# Patient Record
Sex: FEMALE | Race: WHITE | NOT HISPANIC OR LATINO | Employment: OTHER | URBAN - METROPOLITAN AREA
[De-identification: names, ages, dates, MRNs, and addresses within clinical notes are randomized per-mention and may not be internally consistent; named-entity substitution may affect disease eponyms.]

---

## 2023-04-15 DIAGNOSIS — J30.9 ALLERGIC RHINITIS, UNSPECIFIED SEASONALITY, UNSPECIFIED TRIGGER: Primary | ICD-10-CM

## 2023-04-17 RX ORDER — FLUTICASONE PROPIONATE 50 MCG
SPRAY, SUSPENSION (ML) NASAL
Qty: 48 G | Refills: 3 | Status: SHIPPED | OUTPATIENT
Start: 2023-04-17 | End: 2024-02-05 | Stop reason: SINTOL

## 2023-06-15 DIAGNOSIS — E78.2 MIXED HYPERLIPIDEMIA: Primary | ICD-10-CM

## 2023-06-15 RX ORDER — ATORVASTATIN CALCIUM 20 MG/1
TABLET, FILM COATED ORAL
Qty: 90 TABLET | Refills: 1 | Status: SHIPPED | OUTPATIENT
Start: 2023-06-15 | End: 2023-12-15

## 2023-07-10 ENCOUNTER — TELEPHONE (OUTPATIENT)
Dept: PRIMARY CARE | Facility: CLINIC | Age: 83
End: 2023-07-10
Payer: MEDICARE

## 2023-07-10 NOTE — TELEPHONE ENCOUNTER
Patient stated she was instructed to call in 1 month prior to appt requesting labs her apppt is 8/7 please advise

## 2023-07-11 DIAGNOSIS — R35.0 URINARY FREQUENCY: ICD-10-CM

## 2023-07-11 DIAGNOSIS — E78.2 MIXED HYPERLIPIDEMIA: Primary | ICD-10-CM

## 2023-07-11 DIAGNOSIS — I25.10 CORONARY ARTERY DISEASE INVOLVING NATIVE CORONARY ARTERY OF NATIVE HEART WITHOUT ANGINA PECTORIS: ICD-10-CM

## 2023-07-11 DIAGNOSIS — E55.9 VITAMIN D DEFICIENCY: ICD-10-CM

## 2023-07-11 PROBLEM — I35.1 NONRHEUMATIC AORTIC VALVE INSUFFICIENCY: Status: ACTIVE | Noted: 2023-07-11

## 2023-07-11 PROBLEM — G47.00 INSOMNIA: Status: ACTIVE | Noted: 2023-07-11

## 2023-07-11 PROBLEM — F32.5 DEPRESSION, MAJOR, IN REMISSION (CMS-HCC): Status: ACTIVE | Noted: 2023-07-11

## 2023-07-11 PROBLEM — K21.9 GASTROESOPHAGEAL REFLUX: Status: ACTIVE | Noted: 2023-07-11

## 2023-07-11 PROBLEM — I47.29 VENTRICULAR TACHYCARDIA, NONSUSTAINED (MULTI): Status: ACTIVE | Noted: 2023-07-11

## 2023-07-11 PROBLEM — H26.9 CATARACT, BILATERAL: Status: ACTIVE | Noted: 2023-07-11

## 2023-07-11 PROBLEM — H90.3 SENSORINEURAL HEARING LOSS (SNHL) OF BOTH EARS: Status: ACTIVE | Noted: 2023-07-11

## 2023-07-11 PROBLEM — F32.A CONTROLLED DEPRESSION: Status: ACTIVE | Noted: 2023-07-11

## 2023-07-11 PROBLEM — R09.89 CAROTID BRUIT: Status: ACTIVE | Noted: 2023-07-11

## 2023-07-11 PROBLEM — I44.7 LEFT BUNDLE BRANCH BLOCK (LBBB): Status: ACTIVE | Noted: 2023-07-11

## 2023-07-11 PROBLEM — M50.30 DEGENERATION OF INTERVERTEBRAL DISC OF CERVICAL REGION: Status: ACTIVE | Noted: 2023-07-11

## 2023-07-11 PROBLEM — Z95.2 S/P TAVR (TRANSCATHETER AORTIC VALVE REPLACEMENT): Status: ACTIVE | Noted: 2023-07-11

## 2023-07-15 ENCOUNTER — LAB (OUTPATIENT)
Dept: LAB | Facility: LAB | Age: 83
End: 2023-07-15
Payer: MEDICARE

## 2023-07-15 DIAGNOSIS — I25.10 CORONARY ARTERY DISEASE INVOLVING NATIVE CORONARY ARTERY OF NATIVE HEART WITHOUT ANGINA PECTORIS: ICD-10-CM

## 2023-07-15 DIAGNOSIS — R35.0 URINARY FREQUENCY: ICD-10-CM

## 2023-07-15 DIAGNOSIS — E78.2 MIXED HYPERLIPIDEMIA: ICD-10-CM

## 2023-07-15 DIAGNOSIS — E55.9 VITAMIN D DEFICIENCY: ICD-10-CM

## 2023-07-15 LAB
ALANINE AMINOTRANSFERASE (SGPT) (U/L) IN SER/PLAS: 15 U/L (ref 7–45)
ALBUMIN (G/DL) IN SER/PLAS: 4.3 G/DL (ref 3.4–5)
ALKALINE PHOSPHATASE (U/L) IN SER/PLAS: 78 U/L (ref 33–136)
ANION GAP IN SER/PLAS: 12 MMOL/L (ref 10–20)
APPEARANCE, URINE: CLEAR
ASPARTATE AMINOTRANSFERASE (SGOT) (U/L) IN SER/PLAS: 18 U/L (ref 9–39)
BACTERIA, URINE: ABNORMAL /HPF
BASOPHILS (10*3/UL) IN BLOOD BY AUTOMATED COUNT: 0.05 X10E9/L (ref 0–0.1)
BASOPHILS/100 LEUKOCYTES IN BLOOD BY AUTOMATED COUNT: 0.9 % (ref 0–2)
BILIRUBIN TOTAL (MG/DL) IN SER/PLAS: 0.7 MG/DL (ref 0–1.2)
BILIRUBIN, URINE: NEGATIVE
BLOOD, URINE: NEGATIVE
CALCIDIOL (25 OH VITAMIN D3) (NG/ML) IN SER/PLAS: 67 NG/ML
CALCIUM (MG/DL) IN SER/PLAS: 9.1 MG/DL (ref 8.6–10.6)
CARBON DIOXIDE, TOTAL (MMOL/L) IN SER/PLAS: 28 MMOL/L (ref 21–32)
CHLORIDE (MMOL/L) IN SER/PLAS: 103 MMOL/L (ref 98–107)
CHOLESTEROL (MG/DL) IN SER/PLAS: 154 MG/DL (ref 0–199)
CHOLESTEROL IN HDL (MG/DL) IN SER/PLAS: 64.7 MG/DL
CHOLESTEROL/HDL RATIO: 2.4
COLOR, URINE: ABNORMAL
CREATININE (MG/DL) IN SER/PLAS: 0.86 MG/DL (ref 0.5–1.05)
EOSINOPHILS (10*3/UL) IN BLOOD BY AUTOMATED COUNT: 0.16 X10E9/L (ref 0–0.4)
EOSINOPHILS/100 LEUKOCYTES IN BLOOD BY AUTOMATED COUNT: 2.8 % (ref 0–6)
ERYTHROCYTE DISTRIBUTION WIDTH (RATIO) BY AUTOMATED COUNT: 13.5 % (ref 11.5–14.5)
ERYTHROCYTE MEAN CORPUSCULAR HEMOGLOBIN CONCENTRATION (G/DL) BY AUTOMATED: 31.9 G/DL (ref 32–36)
ERYTHROCYTE MEAN CORPUSCULAR VOLUME (FL) BY AUTOMATED COUNT: 90 FL (ref 80–100)
ERYTHROCYTES (10*6/UL) IN BLOOD BY AUTOMATED COUNT: 4.45 X10E12/L (ref 4–5.2)
GFR FEMALE: 66 ML/MIN/1.73M2
GLUCOSE (MG/DL) IN SER/PLAS: 85 MG/DL (ref 74–99)
GLUCOSE, URINE: NEGATIVE MG/DL
HEMATOCRIT (%) IN BLOOD BY AUTOMATED COUNT: 40.1 % (ref 36–46)
HEMOGLOBIN (G/DL) IN BLOOD: 12.8 G/DL (ref 12–16)
IMMATURE GRANULOCYTES/100 LEUKOCYTES IN BLOOD BY AUTOMATED COUNT: 0.4 % (ref 0–0.9)
KETONES, URINE: NEGATIVE MG/DL
LDL: 72 MG/DL (ref 0–99)
LEUKOCYTE ESTERASE, URINE: ABNORMAL
LEUKOCYTES (10*3/UL) IN BLOOD BY AUTOMATED COUNT: 5.7 X10E9/L (ref 4.4–11.3)
LYMPHOCYTES (10*3/UL) IN BLOOD BY AUTOMATED COUNT: 1.51 X10E9/L (ref 0.8–3)
LYMPHOCYTES/100 LEUKOCYTES IN BLOOD BY AUTOMATED COUNT: 26.7 % (ref 13–44)
MONOCYTES (10*3/UL) IN BLOOD BY AUTOMATED COUNT: 0.3 X10E9/L (ref 0.05–0.8)
MONOCYTES/100 LEUKOCYTES IN BLOOD BY AUTOMATED COUNT: 5.3 % (ref 2–10)
NEUTROPHILS (10*3/UL) IN BLOOD BY AUTOMATED COUNT: 3.62 X10E9/L (ref 1.6–5.5)
NEUTROPHILS/100 LEUKOCYTES IN BLOOD BY AUTOMATED COUNT: 63.9 % (ref 40–80)
NITRITE, URINE: NEGATIVE
NRBC (PER 100 WBCS) BY AUTOMATED COUNT: 0 /100 WBC (ref 0–0)
PH, URINE: 7 (ref 5–8)
PLATELETS (10*3/UL) IN BLOOD AUTOMATED COUNT: 203 X10E9/L (ref 150–450)
POTASSIUM (MMOL/L) IN SER/PLAS: 4.3 MMOL/L (ref 3.5–5.3)
PROTEIN TOTAL: 6.7 G/DL (ref 6.4–8.2)
PROTEIN, URINE: NEGATIVE MG/DL
RBC, URINE: ABNORMAL /HPF (ref 0–5)
SODIUM (MMOL/L) IN SER/PLAS: 139 MMOL/L (ref 136–145)
SPECIFIC GRAVITY, URINE: 1.01 (ref 1–1.03)
SQUAMOUS EPITHELIAL CELLS, URINE: <1 /HPF
THYROTROPIN (MIU/L) IN SER/PLAS BY DETECTION LIMIT <= 0.05 MIU/L: 2.74 MIU/L (ref 0.44–3.98)
TRIGLYCERIDE (MG/DL) IN SER/PLAS: 86 MG/DL (ref 0–149)
UREA NITROGEN (MG/DL) IN SER/PLAS: 14 MG/DL (ref 6–23)
UROBILINOGEN, URINE: <2 MG/DL (ref 0–1.9)
VLDL: 17 MG/DL (ref 0–40)
WBC, URINE: 1 /HPF (ref 0–5)

## 2023-07-15 PROCEDURE — 85025 COMPLETE CBC W/AUTO DIFF WBC: CPT

## 2023-07-15 PROCEDURE — 80061 LIPID PANEL: CPT

## 2023-07-15 PROCEDURE — 80053 COMPREHEN METABOLIC PANEL: CPT

## 2023-07-15 PROCEDURE — 84443 ASSAY THYROID STIM HORMONE: CPT

## 2023-07-15 PROCEDURE — 81001 URINALYSIS AUTO W/SCOPE: CPT

## 2023-07-15 PROCEDURE — 82306 VITAMIN D 25 HYDROXY: CPT

## 2023-07-15 PROCEDURE — 36415 COLL VENOUS BLD VENIPUNCTURE: CPT

## 2023-07-18 DIAGNOSIS — F32.A CONTROLLED DEPRESSION: Primary | ICD-10-CM

## 2023-07-18 RX ORDER — FLUOXETINE HYDROCHLORIDE 20 MG/1
CAPSULE ORAL
Qty: 90 CAPSULE | Refills: 1 | Status: SHIPPED | OUTPATIENT
Start: 2023-07-18 | End: 2024-01-18

## 2023-08-02 PROBLEM — R01.1 MURMUR: Status: ACTIVE | Noted: 2023-08-02

## 2023-08-02 PROBLEM — L30.9 ECZEMA: Status: ACTIVE | Noted: 2023-08-02

## 2023-08-02 PROBLEM — S76.311A STRAIN OF RIGHT HAMSTRING: Status: ACTIVE | Noted: 2023-08-02

## 2023-08-02 PROBLEM — R39.9 UTI SYMPTOMS: Status: ACTIVE | Noted: 2023-08-02

## 2023-08-02 PROBLEM — R35.0 URINARY FREQUENCY: Status: ACTIVE | Noted: 2023-08-02

## 2023-08-02 PROBLEM — R42 VERTIGO: Status: ACTIVE | Noted: 2023-08-02

## 2023-08-02 PROBLEM — H91.90 DECREASED HEARING: Status: ACTIVE | Noted: 2023-08-02

## 2023-08-02 PROBLEM — R07.89 CHEST HEAVINESS: Status: ACTIVE | Noted: 2023-08-02

## 2023-08-02 PROBLEM — J34.2 NASAL SEPTAL DEVIATION: Status: ACTIVE | Noted: 2023-08-02

## 2023-08-02 PROBLEM — J01.90 ACUTE NON-RECURRENT SINUSITIS: Status: ACTIVE | Noted: 2023-08-02

## 2023-08-02 PROBLEM — M85.80 OSTEOPENIA: Status: ACTIVE | Noted: 2023-08-02

## 2023-08-02 PROBLEM — R92.8 ABNORMAL MAMMOGRAM: Status: ACTIVE | Noted: 2023-08-02

## 2023-08-02 PROBLEM — J30.1 ALLERGIC RHINITIS DUE TO POLLEN: Status: ACTIVE | Noted: 2023-08-02

## 2023-08-02 PROBLEM — R55 SYNCOPE: Status: ACTIVE | Noted: 2023-08-02

## 2023-08-02 PROBLEM — L60.0 INGROWN TOENAIL: Status: ACTIVE | Noted: 2023-08-02

## 2023-08-02 PROBLEM — I35.0 AORTIC STENOSIS: Status: ACTIVE | Noted: 2017-11-14

## 2023-08-02 RX ORDER — ALBUTEROL SULFATE 90 UG/1
AEROSOL, METERED RESPIRATORY (INHALATION)
COMMUNITY
Start: 2017-03-22

## 2023-08-02 RX ORDER — LORATADINE 10 MG/1
1 TABLET ORAL DAILY
COMMUNITY

## 2023-08-02 RX ORDER — MV-MN/OM3/DHA/EPA/FISH/LUT/ZEA 250-5-1 MG
CAPSULE ORAL
COMMUNITY
Start: 2022-12-12 | End: 2023-10-23 | Stop reason: ALTCHOICE

## 2023-08-02 RX ORDER — CHOLECALCIFEROL (VITAMIN D3) 125 MCG
1 CAPSULE ORAL DAILY
COMMUNITY
Start: 2014-01-23

## 2023-08-02 RX ORDER — PREDNISOLONE ACETATE 10 MG/ML
SUSPENSION/ DROPS OPHTHALMIC
COMMUNITY
Start: 2023-07-01 | End: 2023-08-07 | Stop reason: ALTCHOICE

## 2023-08-02 RX ORDER — MONTELUKAST SODIUM 10 MG/1
10 TABLET ORAL
COMMUNITY
End: 2023-10-23 | Stop reason: ALTCHOICE

## 2023-08-02 RX ORDER — NAPROXEN SODIUM 220 MG/1
81 TABLET, FILM COATED ORAL
COMMUNITY

## 2023-08-02 RX ORDER — DICLOFENAC SODIUM 1 MG/ML
SOLUTION/ DROPS OPHTHALMIC
COMMUNITY
Start: 2023-07-01 | End: 2023-08-07 | Stop reason: ALTCHOICE

## 2023-08-02 RX ORDER — ACETAMINOPHEN 500 MG
5000 TABLET ORAL
COMMUNITY
End: 2023-08-07 | Stop reason: SDUPTHER

## 2023-08-02 RX ORDER — BETAMETHASONE VALERATE 1 MG/ML
LOTION CUTANEOUS
COMMUNITY
End: 2023-08-07 | Stop reason: ALTCHOICE

## 2023-08-02 RX ORDER — OMEPRAZOLE 20 MG/1
1 CAPSULE, DELAYED RELEASE ORAL DAILY
COMMUNITY
Start: 2016-05-30 | End: 2023-10-23 | Stop reason: ALTCHOICE

## 2023-08-02 RX ORDER — LOSARTAN POTASSIUM 25 MG/1
25 TABLET ORAL
COMMUNITY
End: 2023-08-07 | Stop reason: ALTCHOICE

## 2023-08-02 RX ORDER — CARVEDILOL 3.12 MG/1
3.12 TABLET ORAL
COMMUNITY
End: 2023-08-07 | Stop reason: ALTCHOICE

## 2023-08-02 RX ORDER — CLINDAMYCIN PHOSPHATE 11.9 MG/ML
SOLUTION TOPICAL
COMMUNITY
End: 2023-10-23 | Stop reason: ALTCHOICE

## 2023-08-07 ENCOUNTER — OFFICE VISIT (OUTPATIENT)
Dept: PRIMARY CARE | Facility: CLINIC | Age: 83
End: 2023-08-07
Payer: MEDICARE

## 2023-08-07 VITALS
HEART RATE: 68 BPM | DIASTOLIC BLOOD PRESSURE: 72 MMHG | BODY MASS INDEX: 25.1 KG/M2 | WEIGHT: 147 LBS | SYSTOLIC BLOOD PRESSURE: 124 MMHG | HEIGHT: 64 IN | OXYGEN SATURATION: 97 %

## 2023-08-07 DIAGNOSIS — I47.29 VENTRICULAR TACHYCARDIA, NONSUSTAINED (MULTI): ICD-10-CM

## 2023-08-07 DIAGNOSIS — E78.2 MIXED HYPERLIPIDEMIA: ICD-10-CM

## 2023-08-07 DIAGNOSIS — F32.5 DEPRESSION, MAJOR, IN REMISSION (CMS-HCC): ICD-10-CM

## 2023-08-07 DIAGNOSIS — K21.9 GASTROESOPHAGEAL REFLUX DISEASE, UNSPECIFIED WHETHER ESOPHAGITIS PRESENT: Primary | ICD-10-CM

## 2023-08-07 PROBLEM — J01.90 ACUTE NON-RECURRENT SINUSITIS: Status: RESOLVED | Noted: 2023-08-02 | Resolved: 2023-08-07

## 2023-08-07 PROBLEM — R07.89 CHEST HEAVINESS: Status: RESOLVED | Noted: 2023-08-02 | Resolved: 2023-08-07

## 2023-08-07 PROBLEM — R42 VERTIGO: Status: RESOLVED | Noted: 2023-08-02 | Resolved: 2023-08-07

## 2023-08-07 PROCEDURE — 1160F RVW MEDS BY RX/DR IN RCRD: CPT | Performed by: INTERNAL MEDICINE

## 2023-08-07 PROCEDURE — 1126F AMNT PAIN NOTED NONE PRSNT: CPT | Performed by: INTERNAL MEDICINE

## 2023-08-07 PROCEDURE — 99214 OFFICE O/P EST MOD 30 MIN: CPT | Performed by: INTERNAL MEDICINE

## 2023-08-07 PROCEDURE — 1036F TOBACCO NON-USER: CPT | Performed by: INTERNAL MEDICINE

## 2023-08-07 PROCEDURE — 1159F MED LIST DOCD IN RCRD: CPT | Performed by: INTERNAL MEDICINE

## 2023-08-07 ASSESSMENT — ENCOUNTER SYMPTOMS
ABDOMINAL PAIN: 0
HEARTBURN: 0
FATIGUE: 0
FREQUENCY: 1
MYALGIAS: 0
DIZZINESS: 0
SHORTNESS OF BREATH: 0

## 2023-08-07 NOTE — PROGRESS NOTES
"Subjective   Patient ID: Aure Hdez is a 83 y.o. female who presents for Follow-up chronic medical problems.    Doing well.  Recent cataract surgery.  Occasional urinary incontinence.  Discussed kegel exercises.  No UTIs.  Cough with allergies.  Meds and labs reviewed.    Hyperlipidemia  This is a chronic problem. The current episode started more than 1 year ago. The problem is controlled. Recent lipid tests were reviewed and are low. There are no known factors aggravating her hyperlipidemia. Pertinent negatives include no chest pain, myalgias or shortness of breath. Current antihyperlipidemic treatment includes statins. The current treatment provides significant improvement of lipids. There are no compliance problems.    GERD  She reports no abdominal pain, no chest pain, no dysphagia or no heartburn. This is a chronic problem. The current episode started more than 1 year ago. The problem occurs rarely. The problem has been resolved. Pertinent negatives include no fatigue.        Review of Systems   Constitutional:  Negative for fatigue.   Respiratory:  Negative for shortness of breath.    Cardiovascular:  Negative for chest pain.   Gastrointestinal:  Negative for abdominal pain, dysphagia and heartburn.   Genitourinary:  Positive for frequency.   Musculoskeletal:  Negative for myalgias.   Neurological:  Negative for dizziness.       Objective   /72 (BP Location: Right arm, Patient Position: Sitting)   Pulse 68   Ht 1.626 m (5' 4\")   Wt 66.7 kg (147 lb)   SpO2 97%   BMI 25.23 kg/m²     Physical Exam  Constitutional:       Appearance: Normal appearance.   Cardiovascular:      Rate and Rhythm: Normal rate and regular rhythm.      Pulses: Normal pulses.      Heart sounds: Normal heart sounds.   Pulmonary:      Effort: Pulmonary effort is normal.      Breath sounds: Normal breath sounds.   Neurological:      General: No focal deficit present.      Mental Status: She is alert.   Psychiatric:         Mood and " Affect: Mood normal.         Behavior: Behavior normal.         Thought Content: Thought content normal.         Judgment: Judgment normal.       Assessment/Plan   Problem List Items Addressed This Visit          Cardiac and Vasculature    Mixed hyperlipidemia     Lipids, LDL at goal with statin.  Recommend low fat/cholesterol diet.           Relevant Orders    Lipid Panel    ALT    Ventricular tachycardia, nonsustained (CMS/HCC)     Ambrosio, cardiology following.            Gastrointestinal and Abdominal    Gastroesophageal reflux - Primary     GERD improved with H2 blocker or PPI.              Mental Health    Depression, major, in remission (CMS/HCC)     Mood improved with SSRI.

## 2023-09-13 ENCOUNTER — TELEPHONE (OUTPATIENT)
Dept: PRIMARY CARE | Facility: CLINIC | Age: 83
End: 2023-09-13
Payer: MEDICARE

## 2023-09-13 DIAGNOSIS — R39.9 UTI SYMPTOMS: Primary | ICD-10-CM

## 2023-09-13 RX ORDER — NITROFURANTOIN 25; 75 MG/1; MG/1
100 CAPSULE ORAL 2 TIMES DAILY
Qty: 14 CAPSULE | Refills: 0 | Status: SHIPPED | OUTPATIENT
Start: 2023-09-13 | End: 2023-09-20

## 2023-09-13 NOTE — TELEPHONE ENCOUNTER
Patient called in and thinks she has uti  , she has pressure ,pain, frequency and burning she has been taking AZO but it hasn't helped would like to be advised

## 2023-09-22 PROBLEM — I10 ESSENTIAL (PRIMARY) HYPERTENSION: Status: ACTIVE | Noted: 2023-09-22

## 2023-09-22 RX ORDER — FLUTICASONE PROPIONATE 44 UG/1
2 AEROSOL, METERED RESPIRATORY (INHALATION)
COMMUNITY
End: 2024-02-05 | Stop reason: ALTCHOICE

## 2023-09-22 RX ORDER — TACROLIMUS 1 MG/G
1 OINTMENT TOPICAL
COMMUNITY
Start: 2018-06-25 | End: 2023-10-23 | Stop reason: ALTCHOICE

## 2023-09-22 RX ORDER — FLUOXETINE 10 MG/1
1 TABLET ORAL DAILY
COMMUNITY
End: 2023-10-23 | Stop reason: ALTCHOICE

## 2023-09-22 RX ORDER — ASCORBIC ACID 500 MG
500 TABLET ORAL
COMMUNITY
Start: 2020-01-08 | End: 2023-10-23 | Stop reason: ALTCHOICE

## 2023-09-22 RX ORDER — HYDROCORTISONE 25 MG/G
1 CREAM TOPICAL
COMMUNITY
Start: 2018-03-26 | End: 2023-10-30 | Stop reason: WASHOUT

## 2023-09-22 RX ORDER — OMEPRAZOLE 20 MG/1
20 TABLET, DELAYED RELEASE ORAL DAILY
COMMUNITY
End: 2024-02-05 | Stop reason: ALTCHOICE

## 2023-10-23 ENCOUNTER — OFFICE VISIT (OUTPATIENT)
Dept: PRIMARY CARE | Facility: CLINIC | Age: 83
End: 2023-10-23
Payer: MEDICARE

## 2023-10-23 ENCOUNTER — APPOINTMENT (OUTPATIENT)
Dept: CARDIOLOGY | Facility: CLINIC | Age: 83
End: 2023-10-23
Payer: MEDICARE

## 2023-10-23 VITALS
HEIGHT: 64 IN | SYSTOLIC BLOOD PRESSURE: 128 MMHG | WEIGHT: 147 LBS | BODY MASS INDEX: 25.1 KG/M2 | DIASTOLIC BLOOD PRESSURE: 74 MMHG | HEART RATE: 65 BPM | OXYGEN SATURATION: 99 %

## 2023-10-23 DIAGNOSIS — K21.9 GASTROESOPHAGEAL REFLUX DISEASE, UNSPECIFIED WHETHER ESOPHAGITIS PRESENT: ICD-10-CM

## 2023-10-23 DIAGNOSIS — L85.3 DRY SKIN DERMATITIS: ICD-10-CM

## 2023-10-23 DIAGNOSIS — J30.1 NON-SEASONAL ALLERGIC RHINITIS DUE TO POLLEN: Primary | ICD-10-CM

## 2023-10-23 PROCEDURE — 3074F SYST BP LT 130 MM HG: CPT | Performed by: INTERNAL MEDICINE

## 2023-10-23 PROCEDURE — 1126F AMNT PAIN NOTED NONE PRSNT: CPT | Performed by: INTERNAL MEDICINE

## 2023-10-23 PROCEDURE — 99213 OFFICE O/P EST LOW 20 MIN: CPT | Performed by: INTERNAL MEDICINE

## 2023-10-23 PROCEDURE — 1036F TOBACCO NON-USER: CPT | Performed by: INTERNAL MEDICINE

## 2023-10-23 PROCEDURE — 3078F DIAST BP <80 MM HG: CPT | Performed by: INTERNAL MEDICINE

## 2023-10-23 PROCEDURE — 1160F RVW MEDS BY RX/DR IN RCRD: CPT | Performed by: INTERNAL MEDICINE

## 2023-10-23 PROCEDURE — 1159F MED LIST DOCD IN RCRD: CPT | Performed by: INTERNAL MEDICINE

## 2023-10-23 ASSESSMENT — ENCOUNTER SYMPTOMS
FATIGUE: 0
ROS GI COMMENTS: NO HEARTBURN.
SHORTNESS OF BREATH: 0
ABDOMINAL PAIN: 0

## 2023-10-23 NOTE — PROGRESS NOTES
"Subjective   Patient ID: Aure Hdez is a 83 y.o. female who presents for Rash (On her back, chronic.  Sts it comes and goes).    Rash comes and goes, different parts of back.  Using otc cortisone as needed.  Symptoms started past year.    Cough after eating cereal in am.  Cough and choking sensation w/o eating.  Feels a lot of phlegm.  No heartburn.  No dysphagia with solids and liquids.  Takes PPI daily.  Use flonase spray as needed.       Review of Systems   Constitutional:  Negative for fatigue.   Respiratory:  Negative for shortness of breath.    Cardiovascular:  Negative for chest pain.   Gastrointestinal:  Negative for abdominal pain.        No heartburn.   Skin:  Positive for rash.       Objective   /74 (BP Location: Right arm, Patient Position: Sitting)   Pulse 65   Ht 1.626 m (5' 4\")   Wt 66.7 kg (147 lb)   SpO2 99%   BMI 25.23 kg/m²     Physical Exam  Constitutional:       Appearance: Normal appearance.   Cardiovascular:      Rate and Rhythm: Normal rate and regular rhythm.      Pulses: Normal pulses.      Heart sounds: Normal heart sounds.   Pulmonary:      Effort: Pulmonary effort is normal.      Breath sounds: Normal breath sounds.   Skin:     Findings: Rash present.      Comments: Dry skin.   Neurological:      General: No focal deficit present.      Mental Status: She is alert.   Psychiatric:         Mood and Affect: Mood normal.         Behavior: Behavior normal.         Thought Content: Thought content normal.         Judgment: Judgment normal.       Assessment/Plan   Problem List Items Addressed This Visit             ICD-10-CM    Gastroesophageal reflux K21.9     No heartburn symptoms with PPI.         Allergic rhinitis due to pollen - Primary J30.1     Rec claritin and flonase spray daily.           Dry skin dermatitis L85.3     Rec otc lotion for dry skin.               "

## 2023-10-30 ENCOUNTER — OFFICE VISIT (OUTPATIENT)
Dept: CARDIOLOGY | Facility: CLINIC | Age: 83
End: 2023-10-30
Payer: MEDICARE

## 2023-10-30 ENCOUNTER — APPOINTMENT (OUTPATIENT)
Dept: CARDIOLOGY | Facility: CLINIC | Age: 83
End: 2023-10-30
Payer: MEDICARE

## 2023-10-30 VITALS
BODY MASS INDEX: 24.96 KG/M2 | HEART RATE: 87 BPM | WEIGHT: 146.2 LBS | SYSTOLIC BLOOD PRESSURE: 124 MMHG | TEMPERATURE: 97.6 F | HEIGHT: 64 IN | DIASTOLIC BLOOD PRESSURE: 69 MMHG

## 2023-10-30 DIAGNOSIS — I10 ESSENTIAL (PRIMARY) HYPERTENSION: ICD-10-CM

## 2023-10-30 DIAGNOSIS — I35.0 NONRHEUMATIC AORTIC VALVE STENOSIS: Primary | ICD-10-CM

## 2023-10-30 DIAGNOSIS — E78.2 MIXED HYPERLIPIDEMIA: ICD-10-CM

## 2023-10-30 DIAGNOSIS — Z95.2 S/P TAVR (TRANSCATHETER AORTIC VALVE REPLACEMENT): ICD-10-CM

## 2023-10-30 PROCEDURE — 3074F SYST BP LT 130 MM HG: CPT | Performed by: NURSE PRACTITIONER

## 2023-10-30 PROCEDURE — 99213 OFFICE O/P EST LOW 20 MIN: CPT | Performed by: NURSE PRACTITIONER

## 2023-10-30 PROCEDURE — 1160F RVW MEDS BY RX/DR IN RCRD: CPT | Performed by: NURSE PRACTITIONER

## 2023-10-30 PROCEDURE — 1126F AMNT PAIN NOTED NONE PRSNT: CPT | Performed by: NURSE PRACTITIONER

## 2023-10-30 PROCEDURE — 1159F MED LIST DOCD IN RCRD: CPT | Performed by: NURSE PRACTITIONER

## 2023-10-30 PROCEDURE — 1036F TOBACCO NON-USER: CPT | Performed by: NURSE PRACTITIONER

## 2023-10-30 PROCEDURE — 3078F DIAST BP <80 MM HG: CPT | Performed by: NURSE PRACTITIONER

## 2023-10-30 NOTE — PROGRESS NOTES
"Chief Complaint:   Aortic Valve Disease      History Of Present Illness:    Aure Hdez is a 83 y.o. female here with Aortic valve disease s/p transcatheter aortic valve replacement (TAVR) in 2018.  The patient has been well since their last appointment and has no cardiac complaints.  The patient denies chest pain, shortness of breath, syncope, or any systemic symptoms. The patient is compliant with aspirin and antibiotic prophylaxis to prevent endocarditis.  Their most recent echocardiogram demonstrates normal prosthetic valve function without significant lalo-prosthetic regurgitation.       Severe AS [Tissue AVR, KIKI ([employer] #[id number])] - 3/16/2018     Allergies:  Lovastatin and Montelukast    Visit Vitals  /69 (BP Location: Right arm)   Pulse 87   Temp 36.4 °C (97.6 °F)   Ht 1.626 m (5' 4\")   Wt 66.3 kg (146 lb 3.2 oz)   BMI 25.10 kg/m²   Smoking Status Never   BSA 1.73 m²         Review of Systems  All pertinent systems have been reviewed and are negative except for what is stated in the history of present illness.    All other systems have been reviewed and are negative and noncontributory to this patient's current ailments.     Last Labs:  CBC -  Lab Results   Component Value Date    WBC 5.7 07/15/2023    HGB 12.8 07/15/2023    HCT 40.1 07/15/2023    MCV 90 07/15/2023     07/15/2023       CMP -  Lab Results   Component Value Date    CALCIUM 9.1 07/15/2023    PHOS 4.6 03/17/2018    PROT 6.7 07/15/2023    ALBUMIN 4.3 07/15/2023    AST 18 07/15/2023    ALT 15 07/15/2023    ALKPHOS 78 07/15/2023    BILITOT 0.7 07/15/2023       LIPID PANEL -   Lab Results   Component Value Date    CHOL 154 07/15/2023    TRIG 86 07/15/2023    HDL 64.7 07/15/2023    CHHDL 2.4 07/15/2023    LDLF 72 07/15/2023    VLDL 17 07/15/2023       RENAL FUNCTION PANEL -   Lab Results   Component Value Date    GLUCOSE 85 07/15/2023     07/15/2023    K 4.3 07/15/2023     07/15/2023    CO2 28 07/15/2023    ANIONGAP 12 " "07/15/2023    BUN 14 07/15/2023    CREATININE 0.86 07/15/2023    CALCIUM 9.1 07/15/2023    PHOS 4.6 03/17/2018    ALBUMIN 4.3 07/15/2023        No results found for: \"BNP\", \"HGBA1C\"    Objective   Vitals reviewed.   Constitutional:       Appearance: Healthy appearance. Not in distress.   Neck:      Vascular: No JVR. JVD normal.   Pulmonary:      Effort: Pulmonary effort is normal.      Breath sounds: Normal breath sounds. No wheezing. No rhonchi. No rales.   Chest:      Chest wall: Not tender to palpatation.   Cardiovascular:      PMI at left midclavicular line. Normal rate. Regular rhythm. Normal S1. Normal S2.       Murmurs: There is a grade 3/6 midsystolic murmur.      No gallop.  No click. No rub.   Pulses:     Intact distal pulses.   Edema:     Peripheral edema absent.   Abdominal:      General: Bowel sounds are normal.      Palpations: Abdomen is soft.      Tenderness: There is no abdominal tenderness.   Musculoskeletal: Normal range of motion.         General: No tenderness. Skin:     General: Skin is warm and dry.   Neurological:      General: No focal deficit present.      Mental Status: Alert and oriented to person, place and time.   Psychiatric:         Attention and Perception: Attention normal.         Mood and Affect: Mood normal.       Assessment/Plan   Diagnoses and all orders for this visit:  Nonrheumatic aortic valve stenosis  - Transthoracic echo (TTE) complete; next year. Insurance denied this year  S/P TAVR (transcatheter aortic valve replacement)  - compliant with abx ppx prior to dental work  - no cardiac complaints   Essential (primary) hypertension  - Well controlled, no changes. Continue current medications  Mixed hyperlipidemia  - Well controlled, no changes. Continue current medications      Current Outpatient Medications:     albuterol (ProAir HFA) 90 mcg/actuation inhaler, Inhale., Disp: , Rfl:     aspirin 81 mg chewable tablet, Chew 1 tablet (81 mg) once daily., Disp: , Rfl:     " atorvastatin (Lipitor) 20 mg tablet, TAKE ONE TABLET BY MOUTH EVERY DAY, Disp: 90 tablet, Rfl: 1    cholecalciferol (Vitamin D-3) 125 MCG (5000 UT) capsule, Take 1 capsule (5,000 Units) by mouth once daily., Disp: , Rfl:     FLUoxetine (PROzac) 20 mg capsule, TAKE ONE CAPSULE BY MOUTH EVERY DAY, Disp: 90 capsule, Rfl: 1    fluticasone (Flonase) 50 mcg/actuation nasal spray, USE 2 SPRAYS IN EACH NOSTRIL DAILY, Disp: 48 g, Rfl: 3    fluticasone (Flovent HFA) 44 mcg/actuation inhaler, Inhale 2 puffs 2 times a day., Disp: , Rfl:     loratadine (Claritin) 10 mg tablet, Take 1 tablet (10 mg) by mouth once daily., Disp: , Rfl:     omeprazole OTC (PriLOSEC OTC) 20 mg EC tablet, Take 1 tablet (20 mg) by mouth once daily., Disp: , Rfl:

## 2023-11-20 ENCOUNTER — APPOINTMENT (OUTPATIENT)
Dept: PRIMARY CARE | Facility: CLINIC | Age: 83
End: 2023-11-20
Payer: MEDICARE

## 2023-11-22 ENCOUNTER — APPOINTMENT (OUTPATIENT)
Dept: PRIMARY CARE | Facility: CLINIC | Age: 83
End: 2023-11-22
Payer: MEDICARE

## 2023-12-15 DIAGNOSIS — E78.2 MIXED HYPERLIPIDEMIA: ICD-10-CM

## 2023-12-15 RX ORDER — ATORVASTATIN CALCIUM 20 MG/1
TABLET, FILM COATED ORAL
Qty: 90 TABLET | Refills: 3 | Status: SHIPPED | OUTPATIENT
Start: 2023-12-15

## 2024-01-15 ENCOUNTER — DOCUMENTATION (OUTPATIENT)
Dept: CARDIOLOGY | Facility: HOSPITAL | Age: 84
End: 2024-01-15
Payer: MEDICARE

## 2024-01-15 DIAGNOSIS — Z95.2 S/P TAVR (TRANSCATHETER AORTIC VALVE REPLACEMENT): ICD-10-CM

## 2024-01-15 NOTE — PROGRESS NOTES
Mrs Aure Hdez is a participant in [employer] low Risk study.  A phone call was conducted as part of her 6 years follow up.  Will  chedule her 7 years follow up with Dr. Preciado ( Jan13 2025-13 May 2025)    SUBJECT ID:  [id number]       Visit Type: 6 YEAR    Date of Assessment:  6 YEARS FOLLOW UP     Has the subject been re-consented since the last visit?   NO     Visit Type:   TELEPHONE    SUBJECT CURRENT STATUS:  [] Hospital - Inpatient   [] Hospital - Outpatient  [] Rehabilitation center -> Complete [facility] Log  [] Nursing Home -> Complete [facility] Log  [x] Subjects's home   [] Other, specify:       NYHA Classification   [x] I  [] II  [] III  [] IV  [] Not Done  [] Unable to assess    Have any serious adverse events occurred since the last visit  [x] No  [] Yes

## 2024-01-18 DIAGNOSIS — F32.A CONTROLLED DEPRESSION: ICD-10-CM

## 2024-01-18 RX ORDER — FLUOXETINE HYDROCHLORIDE 20 MG/1
CAPSULE ORAL
Qty: 90 CAPSULE | Refills: 1 | Status: SHIPPED | OUTPATIENT
Start: 2024-01-18

## 2024-02-05 ENCOUNTER — OFFICE VISIT (OUTPATIENT)
Dept: PRIMARY CARE | Facility: CLINIC | Age: 84
End: 2024-02-05
Payer: MEDICARE

## 2024-02-05 VITALS
BODY MASS INDEX: 24.92 KG/M2 | WEIGHT: 146 LBS | DIASTOLIC BLOOD PRESSURE: 66 MMHG | HEIGHT: 64 IN | OXYGEN SATURATION: 97 % | SYSTOLIC BLOOD PRESSURE: 130 MMHG | HEART RATE: 59 BPM

## 2024-02-05 DIAGNOSIS — Z12.31 ENCOUNTER FOR SCREENING MAMMOGRAM FOR BREAST CANCER: ICD-10-CM

## 2024-02-05 DIAGNOSIS — F32.5 DEPRESSION, MAJOR, IN REMISSION (CMS-HCC): ICD-10-CM

## 2024-02-05 DIAGNOSIS — Z78.0 ASYMPTOMATIC MENOPAUSAL STATE: ICD-10-CM

## 2024-02-05 DIAGNOSIS — K21.9 GASTROESOPHAGEAL REFLUX DISEASE, UNSPECIFIED WHETHER ESOPHAGITIS PRESENT: ICD-10-CM

## 2024-02-05 DIAGNOSIS — Z00.00 ROUTINE GENERAL MEDICAL EXAMINATION AT HEALTH CARE FACILITY: Primary | ICD-10-CM

## 2024-02-05 DIAGNOSIS — E78.2 MIXED HYPERLIPIDEMIA: ICD-10-CM

## 2024-02-05 PROBLEM — R42 VERTIGO: Status: RESOLVED | Noted: 2023-08-02 | Resolved: 2024-02-05

## 2024-02-05 PROBLEM — I10 ESSENTIAL (PRIMARY) HYPERTENSION: Status: RESOLVED | Noted: 2023-09-22 | Resolved: 2024-02-05

## 2024-02-05 PROBLEM — I47.29 VENTRICULAR TACHYCARDIA, NONSUSTAINED (MULTI): Status: RESOLVED | Noted: 2023-07-11 | Resolved: 2024-02-05

## 2024-02-05 PROBLEM — Z86.79 HISTORY OF VENTRICULAR TACHYCARDIA: Status: ACTIVE | Noted: 2024-02-05

## 2024-02-05 PROBLEM — R12 HEARTBURN: Status: ACTIVE | Noted: 2024-02-05

## 2024-02-05 PROCEDURE — 1126F AMNT PAIN NOTED NONE PRSNT: CPT | Performed by: INTERNAL MEDICINE

## 2024-02-05 PROCEDURE — 1159F MED LIST DOCD IN RCRD: CPT | Performed by: INTERNAL MEDICINE

## 2024-02-05 PROCEDURE — 1170F FXNL STATUS ASSESSED: CPT | Performed by: INTERNAL MEDICINE

## 2024-02-05 PROCEDURE — G0439 PPPS, SUBSEQ VISIT: HCPCS | Performed by: INTERNAL MEDICINE

## 2024-02-05 PROCEDURE — 1160F RVW MEDS BY RX/DR IN RCRD: CPT | Performed by: INTERNAL MEDICINE

## 2024-02-05 PROCEDURE — 1036F TOBACCO NON-USER: CPT | Performed by: INTERNAL MEDICINE

## 2024-02-05 PROCEDURE — 99214 OFFICE O/P EST MOD 30 MIN: CPT | Performed by: INTERNAL MEDICINE

## 2024-02-05 RX ORDER — FAMOTIDINE 20 MG/1
20 TABLET, FILM COATED ORAL DAILY
COMMUNITY

## 2024-02-05 ASSESSMENT — ENCOUNTER SYMPTOMS
DEPRESSED MOOD: 0
SHORTNESS OF BREATH: 0
FATIGUE: 0
MEMORY IMPAIRMENT: 0
NERVOUS/ANXIOUS: 0
HOPELESSNESS: 0
PANIC: 0
MYALGIAS: 0
ABDOMINAL PAIN: 0
HEARTBURN: 0
DEPRESSION: 1
PALPITATIONS: 0
THOUGHTS THAT DEATH WOULD BE EASIER: 0
DIZZINESS: 0

## 2024-02-05 ASSESSMENT — ACTIVITIES OF DAILY LIVING (ADL)
TAKING_MEDICATION: INDEPENDENT
GROCERY_SHOPPING: INDEPENDENT
DOING_HOUSEWORK: INDEPENDENT
DRESSING: INDEPENDENT
MANAGING_FINANCES: INDEPENDENT
BATHING: INDEPENDENT

## 2024-02-05 ASSESSMENT — PATIENT HEALTH QUESTIONNAIRE - PHQ9
SUM OF ALL RESPONSES TO PHQ9 QUESTIONS 1 AND 2: 0
1. LITTLE INTEREST OR PLEASURE IN DOING THINGS: NOT AT ALL
2. FEELING DOWN, DEPRESSED OR HOPELESS: NOT AT ALL

## 2024-02-05 NOTE — PROGRESS NOTES
Subjective   Reason for Visit: Aure Hdez is an 83 y.o. female here for a Medicare Wellness visit and chronic medical problems.    Past Medical, Surgical, and Family History reviewed and updated in chart.    Reviewed all medications by prescribing practitioner or clinical pharmacist (such as prescriptions, OTCs, herbal therapies and supplements) and documented in the medical record.    Hyperlipidemia  This is a chronic problem. The current episode started more than 1 year ago. The problem is controlled. Recent lipid tests were reviewed and are low. There are no known factors aggravating her hyperlipidemia. Pertinent negatives include no chest pain, myalgias or shortness of breath. Current antihyperlipidemic treatment includes statins. The current treatment provides significant improvement of lipids. There are no compliance problems.    Depression  Visit Type: follow-up  Patient is not experiencing: chest pain, depressed mood, feelings of hopelessness, memory impairment, nervousness/anxiety, palpitations, panic, shortness of breath, suicidal ideas, suicidal planning and thoughts of death.  Frequency of symptoms: rarely    Compliance with medications:  %    GERD  She reports no abdominal pain, no chest pain, no dysphagia or no heartburn. This is a chronic problem. The current episode started more than 1 year ago. The problem occurs rarely. The problem has been resolved. Pertinent negatives include no fatigue.       Patient Care Team:  Rito Tovar MD as PCP - General  Rito Tovar MD as PCP - [employer] PCP     Review of Systems   Constitutional:  Negative for fatigue.   Respiratory:  Negative for shortness of breath.    Cardiovascular:  Negative for chest pain and palpitations.   Gastrointestinal:  Negative for abdominal pain, dysphagia and heartburn.   Musculoskeletal:  Negative for myalgias.   Neurological:  Negative for dizziness.   Psychiatric/Behavioral:  Positive for depression.  "Negative for suicidal ideas. The patient is not nervous/anxious.        Objective   Vitals:  /66 (BP Location: Right arm, Patient Position: Sitting)   Pulse 59   Ht 1.626 m (5' 4\")   Wt 66.2 kg (146 lb)   SpO2 97%   BMI 25.06 kg/m²       Physical Exam  Constitutional:       Appearance: Normal appearance.   Neck:      Vascular: No carotid bruit.   Cardiovascular:      Rate and Rhythm: Normal rate and regular rhythm.      Pulses: Normal pulses.      Heart sounds: Murmur heard.   Pulmonary:      Effort: Pulmonary effort is normal.      Breath sounds: Normal breath sounds.   Neurological:      General: No focal deficit present.      Mental Status: She is alert.   Psychiatric:         Mood and Affect: Mood normal.         Behavior: Behavior normal.         Thought Content: Thought content normal.         Judgment: Judgment normal.         Assessment/Plan   Problem List Items Addressed This Visit       Depression, major, in remission (CMS/HCC)    Current Assessment & Plan     Mood improved with SSRI.         Gastroesophageal reflux    Current Assessment & Plan     Stop PPI.  Rec otc pepcid 20 mg daily.         Mixed hyperlipidemia    Current Assessment & Plan     Lipids, LDL at goal with statin.  Recommend low fat/cholesterol diet.           Asymptomatic menopausal state    Relevant Orders    XR DEXA bone density    Routine general medical examination at health care facility - Primary    Relevant Orders    1 Year Follow Up In Primary Care - Wellness Exam            "

## 2024-02-08 ENCOUNTER — LAB (OUTPATIENT)
Dept: LAB | Facility: LAB | Age: 84
End: 2024-02-08
Payer: MEDICARE

## 2024-02-08 DIAGNOSIS — E78.2 MIXED HYPERLIPIDEMIA: ICD-10-CM

## 2024-02-08 PROCEDURE — 80061 LIPID PANEL: CPT

## 2024-02-08 PROCEDURE — 36415 COLL VENOUS BLD VENIPUNCTURE: CPT

## 2024-02-08 PROCEDURE — 84460 ALANINE AMINO (ALT) (SGPT): CPT

## 2024-02-09 LAB
ALT SERPL W P-5'-P-CCNC: 62 U/L (ref 7–45)
CHOLEST SERPL-MCNC: 130 MG/DL (ref 0–199)
CHOLESTEROL/HDL RATIO: 2.5
HDLC SERPL-MCNC: 51.9 MG/DL
LDLC SERPL CALC-MCNC: 63 MG/DL
NON HDL CHOLESTEROL: 78 MG/DL (ref 0–149)
TRIGL SERPL-MCNC: 77 MG/DL (ref 0–149)
VLDL: 15 MG/DL (ref 0–40)

## 2024-02-13 ENCOUNTER — OFFICE VISIT (OUTPATIENT)
Dept: PRIMARY CARE | Facility: CLINIC | Age: 84
End: 2024-02-13
Payer: MEDICARE

## 2024-02-13 ENCOUNTER — LAB (OUTPATIENT)
Dept: LAB | Facility: LAB | Age: 84
End: 2024-02-13
Payer: MEDICARE

## 2024-02-13 VITALS
HEIGHT: 64 IN | SYSTOLIC BLOOD PRESSURE: 124 MMHG | HEART RATE: 74 BPM | DIASTOLIC BLOOD PRESSURE: 72 MMHG | WEIGHT: 144 LBS | BODY MASS INDEX: 24.59 KG/M2 | OXYGEN SATURATION: 99 %

## 2024-02-13 DIAGNOSIS — E87.6 HYPOKALEMIA: ICD-10-CM

## 2024-02-13 DIAGNOSIS — K52.9 ACUTE GASTROENTERITIS: Primary | ICD-10-CM

## 2024-02-13 DIAGNOSIS — R74.8 ELEVATED LIVER ENZYMES: ICD-10-CM

## 2024-02-13 DIAGNOSIS — R55 SYNCOPE, UNSPECIFIED SYNCOPE TYPE: ICD-10-CM

## 2024-02-13 LAB
ALBUMIN SERPL BCP-MCNC: 3.8 G/DL (ref 3.4–5)
ALP SERPL-CCNC: 96 U/L (ref 33–136)
ALT SERPL W P-5'-P-CCNC: 28 U/L (ref 7–45)
ANION GAP SERPL CALC-SCNC: 11 MMOL/L (ref 10–20)
AST SERPL W P-5'-P-CCNC: 21 U/L (ref 9–39)
BILIRUB SERPL-MCNC: 0.5 MG/DL (ref 0–1.2)
BUN SERPL-MCNC: 10 MG/DL (ref 6–23)
CALCIUM SERPL-MCNC: 9.1 MG/DL (ref 8.6–10.6)
CHLORIDE SERPL-SCNC: 104 MMOL/L (ref 98–107)
CO2 SERPL-SCNC: 31 MMOL/L (ref 21–32)
CREAT SERPL-MCNC: 0.77 MG/DL (ref 0.5–1.05)
EGFRCR SERPLBLD CKD-EPI 2021: 76 ML/MIN/1.73M*2
GLUCOSE SERPL-MCNC: 65 MG/DL (ref 74–99)
HAV IGM SER QL: NONREACTIVE
HBV CORE IGM SER QL: NONREACTIVE
HBV SURFACE AG SERPL QL IA: NONREACTIVE
HCV AB SER QL: NONREACTIVE
POTASSIUM SERPL-SCNC: 4.2 MMOL/L (ref 3.5–5.3)
PROT SERPL-MCNC: 6.4 G/DL (ref 6.4–8.2)
SODIUM SERPL-SCNC: 142 MMOL/L (ref 136–145)

## 2024-02-13 PROCEDURE — 99213 OFFICE O/P EST LOW 20 MIN: CPT | Performed by: INTERNAL MEDICINE

## 2024-02-13 PROCEDURE — 1160F RVW MEDS BY RX/DR IN RCRD: CPT | Performed by: INTERNAL MEDICINE

## 2024-02-13 PROCEDURE — 1159F MED LIST DOCD IN RCRD: CPT | Performed by: INTERNAL MEDICINE

## 2024-02-13 PROCEDURE — 80074 ACUTE HEPATITIS PANEL: CPT

## 2024-02-13 PROCEDURE — 1126F AMNT PAIN NOTED NONE PRSNT: CPT | Performed by: INTERNAL MEDICINE

## 2024-02-13 PROCEDURE — 80053 COMPREHEN METABOLIC PANEL: CPT

## 2024-02-13 PROCEDURE — 36415 COLL VENOUS BLD VENIPUNCTURE: CPT

## 2024-02-13 PROCEDURE — 1036F TOBACCO NON-USER: CPT | Performed by: INTERNAL MEDICINE

## 2024-02-13 ASSESSMENT — ENCOUNTER SYMPTOMS
FATIGUE: 1
DIARRHEA: 0
NAUSEA: 0
DIZZINESS: 0
VOMITING: 0
ABDOMINAL PAIN: 0
CONSTIPATION: 1
SHORTNESS OF BREATH: 0

## 2024-02-13 NOTE — PROGRESS NOTES
"Subjective   Patient ID: Aure Hdez is a 83 y.o. female who presents for Follow-up (After ER- syncope).    1 week ago ate at [employer].  2 hours after eating, stomach cramps.  Diarrhes later that night.  1 am, N/V, felt terrible.  Tried otc meds with no relief.  Passed out trying to walk to BR.  Pt not sure how long she was on the floor.  Felt very dizzy.  1/2 hour later crawled into bed.   called 911, transferred to ED.  Opined dehydration and low potassium.  Still felt bad when discharged home.  Symptoms improved over the following week.  Hospital records reviewed.     Review of Systems   Constitutional:  Positive for fatigue.   Respiratory:  Negative for shortness of breath.    Cardiovascular:  Negative for chest pain.   Gastrointestinal:  Positive for constipation. Negative for abdominal pain, diarrhea, nausea and vomiting.   Neurological:  Negative for dizziness.       Objective   /72 (BP Location: Right arm, Patient Position: Sitting)   Pulse 74   Ht 1.626 m (5' 4\")   Wt 65.3 kg (144 lb)   SpO2 99%   BMI 24.72 kg/m²     Physical Exam  Constitutional:       Appearance: Normal appearance.   Cardiovascular:      Rate and Rhythm: Normal rate and regular rhythm.      Pulses: Normal pulses.      Heart sounds: Murmur heard.   Pulmonary:      Effort: Pulmonary effort is normal.      Breath sounds: Normal breath sounds.   Abdominal:      General: Abdomen is flat. Bowel sounds are normal.      Palpations: Abdomen is soft.   Neurological:      General: No focal deficit present.      Mental Status: She is alert.   Psychiatric:         Mood and Affect: Mood normal.         Behavior: Behavior normal.         Thought Content: Thought content normal.         Judgment: Judgment normal.       Assessment/Plan     ? Food poisoning.  Lab workup including CMP and acute hepatitis panel for hypokalemia and elevated liver enzymes.  Suspect syncope due to severe dehydration.  Advance diet as tolerated.  Call if " no improvement.  Fu next ov.    Problem List Items Addressed This Visit             ICD-10-CM    Syncope R55    Elevated liver enzymes R74.8    Relevant Orders    Comprehensive metabolic panel    Hepatitis panel, acute    Hypokalemia E87.6    Relevant Orders    Comprehensive metabolic panel    Acute gastroenteritis - Primary K52.9

## 2024-02-20 ENCOUNTER — HOSPITAL ENCOUNTER (OUTPATIENT)
Dept: RADIOLOGY | Facility: CLINIC | Age: 84
Discharge: HOME | End: 2024-02-20
Payer: MEDICARE

## 2024-02-20 DIAGNOSIS — Z78.0 ASYMPTOMATIC MENOPAUSAL STATE: ICD-10-CM

## 2024-02-20 PROCEDURE — 77080 DXA BONE DENSITY AXIAL: CPT | Performed by: RADIOLOGY

## 2024-02-20 PROCEDURE — 77080 DXA BONE DENSITY AXIAL: CPT

## 2024-03-27 ENCOUNTER — OFFICE VISIT (OUTPATIENT)
Dept: PRIMARY CARE | Facility: CLINIC | Age: 84
End: 2024-03-27
Payer: MEDICARE

## 2024-03-27 VITALS
BODY MASS INDEX: 24.41 KG/M2 | HEART RATE: 72 BPM | DIASTOLIC BLOOD PRESSURE: 70 MMHG | SYSTOLIC BLOOD PRESSURE: 126 MMHG | OXYGEN SATURATION: 98 % | HEIGHT: 64 IN | WEIGHT: 143 LBS

## 2024-03-27 DIAGNOSIS — J32.2 SINUSITIS CHRONIC, ETHMOIDAL: Primary | ICD-10-CM

## 2024-03-27 RX ORDER — AMOXICILLIN AND CLAVULANATE POTASSIUM 875; 125 MG/1; MG/1
875 TABLET, FILM COATED ORAL 2 TIMES DAILY
Qty: 20 TABLET | Refills: 0 | Status: SHIPPED | OUTPATIENT
Start: 2024-03-27 | End: 2024-04-06

## 2024-03-27 ASSESSMENT — ENCOUNTER SYMPTOMS
RHINORRHEA: 1
EYE DISCHARGE: 0
EYE ITCHING: 1
SINUS PAIN: 0
HEADACHES: 0
FEVER: 0
RESPIRATORY NEGATIVE: 1
CONSTITUTIONAL NEGATIVE: 1
SORE THROAT: 0
PHOTOPHOBIA: 0
FACIAL SWELLING: 0
EYE PAIN: 0
EYE REDNESS: 0
CARDIOVASCULAR NEGATIVE: 1
SINUS PRESSURE: 0

## 2024-03-27 ASSESSMENT — PAIN SCALES - GENERAL: PAINLEVEL: 2

## 2024-03-27 NOTE — PROGRESS NOTES
"Subjective   Patient ID: Aure Hdez is a 83 y.o. female who presents for Facial Swelling (Eyes are red and swollen. Started Monday with the left eye and now it's both eyes, red and they hurt. She felt like something was actually chewing on her bone below her eye the pain was that bad. She does have an eye specialist but thought she would start here first since she could get in. She is now getting drainage from both eyes. /Yane wilkins 2/13/24/Nov franky 8/5/24).    HPI   Patient of Dr Wilkins for acute visit.  Last seen on 02/13/2024.  Current concern:  1) eye lids are swelling, started Monday (day #3). Has not seen her eye specialist as of yet. Started with left eye and now both. Eyes hurt, itchy, watery and painful. Denies pain with eye movement, no change in vision. Chronic runny nose. Had been around 2 sick grandchildren (1 and 5 yr old) just few days ago. Used warm compress and Tylenol for discomfort.   Chronic Concerns; CAD, Depression, GERD, Mixed hyperlipidemia, Insomnia, Hypokalemia, elevated liver enzymes.  Specialist  - cardiology   Labs 02/13/2024     Review of Systems   Constitutional: Negative.  Negative for fever.   HENT:  Positive for congestion and rhinorrhea. Negative for ear pain, facial swelling, sinus pressure, sinus pain and sore throat.    Eyes:  Positive for itching. Negative for photophobia, pain, discharge, redness and visual disturbance.   Respiratory: Negative.     Cardiovascular: Negative.    Skin: Negative.    Neurological:  Negative for headaches.     Objective   /70 (BP Location: Left arm, Patient Position: Sitting, BP Cuff Size: Adult)   Pulse 72   Ht 1.626 m (5' 4\")   Wt 64.9 kg (143 lb)   SpO2 98%   BMI 24.55 kg/m²     Physical Exam  Vitals reviewed.   Constitutional:       Appearance: She is normal weight.   HENT:      Right Ear: Tympanic membrane and ear canal normal.      Left Ear: Tympanic membrane and ear canal normal.      Nose: Nose normal.      Right Turbinates: " Pale.      Left Turbinates: Pale.      Mouth/Throat:      Lips: Pink.      Mouth: Mucous membranes are moist.      Pharynx: Oropharynx is clear.   Eyes:      Extraocular Movements: Extraocular movements intact.      Conjunctiva/sclera:      Right eye: Right conjunctiva is not injected. No chemosis or exudate.     Left eye: Left conjunctiva is not injected. No chemosis or exudate.     Pupils: Pupils are equal, round, and reactive to light.        Comments: Skin surrounding bilateral eyes (as noted on diagram) swollen and erythema, left>right    Cardiovascular:      Rate and Rhythm: Normal rate and regular rhythm.      Heart sounds: Normal heart sounds.   Pulmonary:      Effort: Pulmonary effort is normal.      Breath sounds: Normal breath sounds. No decreased breath sounds or wheezing.   Musculoskeletal:      Cervical back: Normal range of motion and neck supple.   Lymphadenopathy:      Cervical: No cervical adenopathy.   Skin:     General: Skin is warm.      Findings: No rash.   Neurological:      Mental Status: She is alert.      Cranial Nerves: Cranial nerves 2-12 are intact.      Motor: Motor function is intact.      Coordination: Coordination is intact.      Gait: Gait is intact.   Psychiatric:         Attention and Perception: Attention normal.         Mood and Affect: Mood normal.         Behavior: Behavior normal.       Assessment/Plan   Diagnoses and all orders for this visit:  Sinusitis chronic, ethmoidal- differential diagnosis: sinusitis vs cellulitis vs  conjunctivitis  -     amoxicillin-pot clavulanate (Augmentin) 875-125 mg tablet; Take 1 tablet (875 mg) by mouth 2 times a day for 10 days.  Continue to take probiotic while taking Augmentin, use Warm or cool compress to eyes as needed and tylenol if any discomfort  Should start to see improvement in swelling of eyes within 24-48 hours, if gets worse, vision changes or pain increases contact eye doctor immediately or go to ER.     PLAN: follow up as  scheduled with Dr Tovar

## 2024-03-27 NOTE — PATIENT INSTRUCTIONS
Continue to take probiotic while taking Augmentin  Warm or cool compress to eyes as needed and tylenol if any discomfort  Should start to see improvement in swelling of eyes within 24-48 hours, if gets worse, vision changes or pain increases contact eye doctor immediately or go to ER.

## 2024-04-08 ENCOUNTER — APPOINTMENT (OUTPATIENT)
Dept: RADIOLOGY | Facility: CLINIC | Age: 84
End: 2024-04-08
Payer: MEDICARE

## 2024-04-18 ENCOUNTER — HOSPITAL ENCOUNTER (OUTPATIENT)
Dept: RADIOLOGY | Facility: CLINIC | Age: 84
Discharge: HOME | End: 2024-04-18
Payer: MEDICARE

## 2024-04-18 DIAGNOSIS — Z12.31 ENCOUNTER FOR SCREENING MAMMOGRAM FOR BREAST CANCER: ICD-10-CM

## 2024-04-18 PROCEDURE — 77067 SCR MAMMO BI INCL CAD: CPT

## 2024-04-20 ENCOUNTER — LAB REQUISITION (OUTPATIENT)
Dept: LAB | Facility: HOSPITAL | Age: 84
End: 2024-04-20
Payer: MEDICARE

## 2024-04-20 DIAGNOSIS — R30.0 DYSURIA: ICD-10-CM

## 2024-04-20 DIAGNOSIS — N39.0 URINARY TRACT INFECTION, SITE NOT SPECIFIED: ICD-10-CM

## 2024-04-20 PROCEDURE — 87086 URINE CULTURE/COLONY COUNT: CPT

## 2024-04-20 PROCEDURE — 87186 SC STD MICRODIL/AGAR DIL: CPT

## 2024-04-23 LAB — BACTERIA UR CULT: ABNORMAL

## 2024-04-24 ENCOUNTER — TELEPHONE (OUTPATIENT)
Dept: PRIMARY CARE | Facility: CLINIC | Age: 84
End: 2024-04-24
Payer: MEDICARE

## 2024-04-24 NOTE — TELEPHONE ENCOUNTER
Pt called in and was in [facility] over the wknd dx with uti, they rxd  CEPHALEXIN 500 MG 2x daily, [facility]  called pt last nite and changed medicine to another abx, pt would like to know if you think medicine rxd the second time will work the name starts tri(Pt doesn't remember the rest of name she has yet to pick it up ) please advise

## 2024-06-27 ENCOUNTER — PATIENT OUTREACH (OUTPATIENT)
Dept: CARE COORDINATION | Facility: CLINIC | Age: 84
End: 2024-06-27
Payer: MEDICARE

## 2024-06-27 NOTE — PROGRESS NOTES
[facility]  Admitted 6/23/2024  Discharged 6/26/2024  Dx: Fall, Subdural Hematoma, Subarachnoid Hemorrhage    Outreach call to patient to support a smooth transition of care from recent admission.  Patient states, she is getting better. Denies any falls since discharge. Patient denies use of an assistive device but states, she is be cautious with her activities. Patient denies having a headache but states, her head does not feel normal. Patient states, she does feel dizzy when moving her head to fast. Patient has a decreased appetite but is drinking plenty of fluids. Reviewed discharge medications, discharge instructions, assessed social needs, and provided education on importance of follow-up appointment with provider.    Patient states, she is calling to schedule a follow up with the Trauma Team and PCP today. Patient with no needs at this time.  Will continue to monitor through transition period.    Engagement  Call Start Time: 1315 (6/27/2024  1:15 PM)    Medications  Medications reviewed with patient/caregiver?: Yes (6/27/2024  1:15 PM)  Does the patient have all medications ordered at discharge?: Yes (6/27/2024  1:15 PM)  Care Management Interventions: No intervention needed (6/27/2024  1:15 PM)  Prescription Comments: Discharged on Sodium Chloride tabs x 3 days, Discharged on Tylenol as needed for pain (6/27/2024  1:15 PM)  Is the patient taking all medications as directed (includes completed medication regime)?: Yes (6/27/2024  1:15 PM)    Appointments  Does the patient have a primary care provider?: Yes (6/27/2024  1:15 PM)  Care Management Interventions: Advised patient to make appointment ([facility], [facility] (6/27/2024  1:15 PM)  Care Management Interventions: Advised to reschedule appointment (Patient states, she will call and schedule a follow up appointment with the Trauma team.) (6/27/2024  1:15 PM)    Self  Management  What is the home health agency?: not applicable (6/27/2024  1:15 PM)  What Durable Medical Equipment (DME) was ordered?: not applicable (6/27/2024  1:15 PM)    Patient Teaching  Does the patient have access to their discharge instructions?: Yes (6/27/2024  1:15 PM)  Care Management Interventions: Reviewed instructions with patient (6/27/2024  1:15 PM)  What is the patient's perception of their health status since discharge?: Improving (6/27/2024  1:15 PM)  Is the patient/caregiver able to teach back the hierarchy of who to call/visit for symptoms/problems? PCP, Specialist, Home Health nurse, Urgent Care, ED, 911: Yes (6/27/2024  1:15 PM)    Leeanna Young RN/LUIS  [facility]  [phone]

## 2024-07-01 ENCOUNTER — APPOINTMENT (OUTPATIENT)
Dept: PRIMARY CARE | Facility: CLINIC | Age: 84
End: 2024-07-01
Payer: MEDICARE

## 2024-07-01 VITALS
HEART RATE: 57 BPM | BODY MASS INDEX: 25.13 KG/M2 | DIASTOLIC BLOOD PRESSURE: 68 MMHG | WEIGHT: 147.2 LBS | OXYGEN SATURATION: 100 % | HEIGHT: 64 IN | SYSTOLIC BLOOD PRESSURE: 122 MMHG

## 2024-07-01 DIAGNOSIS — R55 SYNCOPE, UNSPECIFIED SYNCOPE TYPE: ICD-10-CM

## 2024-07-01 DIAGNOSIS — E87.1 HYPONATREMIA: ICD-10-CM

## 2024-07-01 DIAGNOSIS — D69.6 PLATELETS DECREASED (CMS-HCC): ICD-10-CM

## 2024-07-01 DIAGNOSIS — Z09 HOSPITAL DISCHARGE FOLLOW-UP: Primary | ICD-10-CM

## 2024-07-01 RX ORDER — OMEPRAZOLE 20 MG/1
20 CAPSULE, DELAYED RELEASE ORAL
COMMUNITY

## 2024-07-01 ASSESSMENT — ENCOUNTER SYMPTOMS
CARDIOVASCULAR NEGATIVE: 1
RESPIRATORY NEGATIVE: 1
DIZZINESS: 0
FATIGUE: 0
NAUSEA: 0

## 2024-07-01 NOTE — PATIENT INSTRUCTIONS
Lab orders about one week before seeing Dr. Tovar  Make sure you are getting adequate fluid throughout the day and healthy snacking  Wait to drive until dizziness has resolved

## 2024-07-01 NOTE — PROGRESS NOTES
"Subjective   Patient ID: Aure Hdez is a 84 y.o. female who presents for Hospital Follow-up (RB patient here for hospital follow up. Was in hospital 6/23/24 to 6/26/24. DX Syncope, collapsed. Per pt she is feeling better today than when she was in the hospital but she has lots of questions. Has been taking tylenol BID for her aches and pains from the fall. She hit the wall on the way down, she fell on her back. She hit the back of her head. /LOV 2/13/24/NOV 8/5/24).    HPI   Patient of Dr Tovar here hospital follow up. Last office visit on 02/13/2024. In company of  Karthik in exam room.  1) hospital follow up- 06/23/2024, discharged 06/26/2024. Syncope , feeling better today.   Has had previous episode of syncope 2 years ago, did not hit head.   Chronic Concerns; CAD, Depression, GERD, Mixed hyperlipidemia, Insomnia, Hypokalemia, elevated liver enzymes.  Specialist  - cardiology   Labs 02/13/2024     Review of Systems   Constitutional:  Negative for fatigue.   Respiratory: Negative.     Cardiovascular: Negative.    Gastrointestinal:  Negative for nausea.   Genitourinary: Negative.    Neurological:  Negative for dizziness.     Objective   /78 (BP Location: Left arm, Patient Position: Sitting, BP Cuff Size: Adult)   Pulse 57   Ht 1.626 m (5' 4\")   Wt 66.8 kg (147 lb 3.2 oz)   SpO2 100%   BMI 25.27 kg/m²     Physical Exam  Vitals reviewed.   Constitutional:       Appearance: She is normal weight.   HENT:      Right Ear: Tympanic membrane and ear canal normal.      Left Ear: Tympanic membrane and ear canal normal.      Nose: Nose normal.      Mouth/Throat:      Lips: Pink.      Mouth: Mucous membranes are dry.      Pharynx: Oropharynx is clear.   Eyes:      General: Lids are normal.      Extraocular Movements: Extraocular movements intact.      Conjunctiva/sclera: Conjunctivae normal.   Neck:      Vascular: No carotid bruit.   Cardiovascular:      Rate and Rhythm: Normal rate and regular rhythm.      " Heart sounds: Normal heart sounds.   Pulmonary:      Effort: Pulmonary effort is normal.      Breath sounds: Normal breath sounds. No decreased breath sounds, wheezing or rhonchi.   Abdominal:      Palpations: Abdomen is soft.   Musculoskeletal:      Cervical back: Neck supple.      Right lower leg: No edema.      Left lower leg: No edema.   Lymphadenopathy:      Cervical: No cervical adenopathy.   Skin:     General: Skin is warm.      Findings: No rash.   Neurological:      General: No focal deficit present.      Mental Status: She is alert.      Coordination: Coordination is intact.   Psychiatric:         Attention and Perception: Attention normal.         Behavior: Behavior normal.       Assessment/Plan   Diagnoses and all orders for this visit:  Hospital discharge follow-up /Syncope, unspecified syncope type  -     CBC; Future  -     Comprehensive Metabolic Panel; Future  Hyponatremia  -     Comprehensive Metabolic Panel; Future  Platelets decreased (CMS-HCC)    PLAN: Follow up as scheduled with Dr Tovar  Lab orders in EMR to complete prior to visit with Dr Tovar   Adequate fluid and health snacking.

## 2024-07-12 ENCOUNTER — PATIENT OUTREACH (OUTPATIENT)
Dept: CARE COORDINATION | Facility: CLINIC | Age: 84
End: 2024-07-12
Payer: MEDICARE

## 2024-07-12 NOTE — PROGRESS NOTES
"Outreach call to patient following up on appointment with primary care provider.  Patient states, things are going alright. She still has some dizziness. She states, she feels like her \"brain is in a fog.\" Denies any falls. Discussed appointment, reviewed medications, and discussed plan of care.  Patient with no additional questions at this time.  Will continue to follow.      CT scan 7/25/2024    Leeanna Young RN/CM  [facility]  [phone]    "

## 2024-07-21 DIAGNOSIS — F32.A CONTROLLED DEPRESSION: ICD-10-CM

## 2024-07-22 ENCOUNTER — TELEPHONE (OUTPATIENT)
Dept: PRIMARY CARE | Facility: CLINIC | Age: 84
End: 2024-07-22
Payer: MEDICARE

## 2024-07-22 RX ORDER — FLUOXETINE HYDROCHLORIDE 20 MG/1
CAPSULE ORAL
Qty: 90 CAPSULE | Refills: 1 | Status: SHIPPED | OUTPATIENT
Start: 2024-07-22

## 2024-07-22 NOTE — TELEPHONE ENCOUNTER
Pt called states he has an itchy rash on face ,neck, and on her back , pt states she thinks she has posion ivy but hasn't been outside to come in contact with it, please advise

## 2024-07-24 ENCOUNTER — OFFICE VISIT (OUTPATIENT)
Dept: PRIMARY CARE | Facility: CLINIC | Age: 84
End: 2024-07-24
Payer: MEDICARE

## 2024-07-24 VITALS
WEIGHT: 147 LBS | BODY MASS INDEX: 25.1 KG/M2 | HEART RATE: 58 BPM | OXYGEN SATURATION: 96 % | SYSTOLIC BLOOD PRESSURE: 126 MMHG | DIASTOLIC BLOOD PRESSURE: 74 MMHG | HEIGHT: 64 IN

## 2024-07-24 DIAGNOSIS — I60.9 SUBARACHNOID HEMORRHAGE (MULTI): ICD-10-CM

## 2024-07-24 DIAGNOSIS — R21 RASH AND NONSPECIFIC SKIN ERUPTION: Primary | ICD-10-CM

## 2024-07-24 DIAGNOSIS — I62.00 SUBDURAL HEMORRHAGE (MULTI): ICD-10-CM

## 2024-07-24 RX ORDER — TRIAMCINOLONE ACETONIDE 1 MG/G
CREAM TOPICAL
COMMUNITY
Start: 2024-07-23

## 2024-07-24 RX ORDER — METHYLPREDNISOLONE 4 MG/1
TABLET ORAL
Qty: 21 TABLET | Refills: 0 | Status: SHIPPED | OUTPATIENT
Start: 2024-07-24 | End: 2024-07-31

## 2024-07-24 RX ORDER — ACETAMINOPHEN 325 MG/1
650 TABLET ORAL EVERY 6 HOURS PRN
COMMUNITY
Start: 2024-06-26

## 2024-07-24 ASSESSMENT — ENCOUNTER SYMPTOMS
DIZZINESS: 1
FATIGUE: 1
SHORTNESS OF BREATH: 0

## 2024-07-24 NOTE — PROGRESS NOTES
"Subjective   Patient ID: Aure Hdez is a 84 y.o. female who presents for Rash (Itchy rash/hives on face, upper extremities, torso x1 week).    Rash left side of face 1 week ago.  Spread to forehead, neck, upper back, left arm, under breast and groin areas.  Itching interfering with sleep.  About 1 week ago cut an outdoor plant.  Not aware of poison ivy exposure.  Using detergent All past few years.  Cat sleeps with her, indoor.    Admitted last month with traumatic brain injury due to syncopal fall.  Ct confirmed subdural hemorrhage.  Neurosurgery opined no surgical intervention.  Fu CT head scheduled for tomorrow.  Since fall has experienced dizziness with spinning.  Not driving.    Hospital records reviewed.         Review of Systems   Constitutional:  Positive for fatigue.   Respiratory:  Negative for shortness of breath.    Cardiovascular:  Negative for chest pain.   Skin:  Positive for rash.   Neurological:  Positive for dizziness.       Objective   /74 (BP Location: Right arm, Patient Position: Sitting)   Pulse 58   Ht 1.626 m (5' 4\")   Wt 66.7 kg (147 lb)   SpO2 96%   BMI 25.23 kg/m²     Physical Exam  Constitutional:       Appearance: Normal appearance.   Skin:     Findings: Rash present.   Neurological:      General: No focal deficit present.      Mental Status: She is alert.   Psychiatric:         Mood and Affect: Mood normal.         Behavior: Behavior normal.         Thought Content: Thought content normal.         Judgment: Judgment normal.         Assessment/Plan     Rash, ? Etiology.  Rec medrol dose pack.  Advised pt to clear with neurosurgery.  Start claritin 10 mg daily.  Add otc pepcid 20 mg daily.  Ok to use steroid cream.  Fu with neurosurgery as directed.    Problem List Items Addressed This Visit             ICD-10-CM    Rash and nonspecific skin eruption - Primary R21    Relevant Medications    methylPREDNISolone (Medrol Dospak) 4 mg tablets    Subdural hemorrhage (Multi) I62.00 "    Subarachnoid hemorrhage (Multi) I60.9

## 2024-07-29 ENCOUNTER — LAB (OUTPATIENT)
Dept: LAB | Facility: LAB | Age: 84
End: 2024-07-29
Payer: MEDICARE

## 2024-07-29 DIAGNOSIS — R55 SYNCOPE, UNSPECIFIED SYNCOPE TYPE: ICD-10-CM

## 2024-07-29 DIAGNOSIS — E87.1 HYPONATREMIA: ICD-10-CM

## 2024-07-29 LAB
ALBUMIN SERPL BCP-MCNC: 4.2 G/DL (ref 3.4–5)
ALP SERPL-CCNC: 82 U/L (ref 33–136)
ALT SERPL W P-5'-P-CCNC: 16 U/L (ref 7–45)
ANION GAP SERPL CALC-SCNC: 15 MMOL/L (ref 10–20)
AST SERPL W P-5'-P-CCNC: 18 U/L (ref 9–39)
BILIRUB SERPL-MCNC: 0.8 MG/DL (ref 0–1.2)
BUN SERPL-MCNC: 16 MG/DL (ref 6–23)
CALCIUM SERPL-MCNC: 9.1 MG/DL (ref 8.6–10.6)
CHLORIDE SERPL-SCNC: 101 MMOL/L (ref 98–107)
CO2 SERPL-SCNC: 26 MMOL/L (ref 21–32)
CREAT SERPL-MCNC: 0.86 MG/DL (ref 0.5–1.05)
EGFRCR SERPLBLD CKD-EPI 2021: 66 ML/MIN/1.73M*2
ERYTHROCYTE [DISTWIDTH] IN BLOOD BY AUTOMATED COUNT: 13.4 % (ref 11.5–14.5)
GLUCOSE SERPL-MCNC: 85 MG/DL (ref 74–99)
HCT VFR BLD AUTO: 38.4 % (ref 36–46)
HGB BLD-MCNC: 12.3 G/DL (ref 12–16)
MCH RBC QN AUTO: 28.9 PG (ref 26–34)
MCHC RBC AUTO-ENTMCNC: 32 G/DL (ref 32–36)
MCV RBC AUTO: 90 FL (ref 80–100)
NRBC BLD-RTO: 0 /100 WBCS (ref 0–0)
PLATELET # BLD AUTO: 187 X10*3/UL (ref 150–450)
POTASSIUM SERPL-SCNC: 4.2 MMOL/L (ref 3.5–5.3)
PROT SERPL-MCNC: 6.8 G/DL (ref 6.4–8.2)
RBC # BLD AUTO: 4.25 X10*6/UL (ref 4–5.2)
SODIUM SERPL-SCNC: 138 MMOL/L (ref 136–145)
WBC # BLD AUTO: 7.9 X10*3/UL (ref 4.4–11.3)

## 2024-07-29 PROCEDURE — 80053 COMPREHEN METABOLIC PANEL: CPT

## 2024-07-29 PROCEDURE — 85027 COMPLETE CBC AUTOMATED: CPT

## 2024-07-30 ENCOUNTER — PATIENT OUTREACH (OUTPATIENT)
Dept: CARE COORDINATION | Facility: CLINIC | Age: 84
End: 2024-07-30
Payer: MEDICARE

## 2024-07-30 NOTE — PROGRESS NOTES
Outreach call to patient to check in 30 days after hospital discharge to support smooth transition of care.  Patient states, she is doing good. Denies any falls since discharge. Denies any headaches since discharge. Patient stares, she does get occasional lightheadedness with blurry vision. Patient states, she has an appointment with Neuro 8/1/2024 and is hoping she can get some answers about her symptoms. Patient with no additional needs noted. No additional outreach needed at this time.   CM will close case    Leeanna Young RN/LUIS  [facility]  [phone]

## 2024-08-05 ENCOUNTER — APPOINTMENT (OUTPATIENT)
Dept: PRIMARY CARE | Facility: CLINIC | Age: 84
End: 2024-08-05
Payer: MEDICARE

## 2024-08-05 VITALS
WEIGHT: 146 LBS | SYSTOLIC BLOOD PRESSURE: 122 MMHG | BODY MASS INDEX: 24.92 KG/M2 | OXYGEN SATURATION: 94 % | DIASTOLIC BLOOD PRESSURE: 70 MMHG | HEIGHT: 64 IN | HEART RATE: 66 BPM

## 2024-08-05 DIAGNOSIS — L29.9 PRURITUS: ICD-10-CM

## 2024-08-05 DIAGNOSIS — R21 RASH AND NONSPECIFIC SKIN ERUPTION: Primary | ICD-10-CM

## 2024-08-05 RX ORDER — METHYLPREDNISOLONE 4 MG/1
TABLET ORAL
Qty: 21 TABLET | Refills: 0 | Status: SHIPPED | OUTPATIENT
Start: 2024-08-05 | End: 2024-08-12

## 2024-08-05 ASSESSMENT — ENCOUNTER SYMPTOMS
FATIGUE: 0
SHORTNESS OF BREATH: 0

## 2024-08-05 NOTE — PROGRESS NOTES
"Subjective   Patient ID: Aure Hdez is a 84 y.o. female who presents for Follow-up chronic medical problems.    Here 2 weeks ago.  Medrol dose pack helped with rash.  Rash resolved over face, arms.  Now has itching left side of face and back.  Using tide, then changed to All past year.  Using sensitive dove.  Indoor cat only.  Limited yard work.    Feels stressed with recent fall, SAH and subdural hematoma.  SAH resolved and subdural reduced by 50% on recent CT.       Review of Systems   Constitutional:  Negative for fatigue.   Respiratory:  Negative for shortness of breath.    Cardiovascular:  Negative for chest pain.   Skin:  Positive for rash.        Itching now involving back and left side of face.       Objective   /70 (BP Location: Right arm, Patient Position: Sitting)   Pulse 66   Ht 1.626 m (5' 4\")   Wt 66.2 kg (146 lb)   SpO2 94%   BMI 25.06 kg/m²     Physical Exam  Constitutional:       Appearance: Normal appearance.   Skin:     Findings: No rash.      Comments: Excoriation marks upper back upper back and left side of face.   Neurological:      General: No focal deficit present.      Mental Status: She is alert.   Psychiatric:         Mood and Affect: Mood normal.         Behavior: Behavior normal.         Thought Content: Thought content normal.         Judgment: Judgment normal.       Assessment/Plan     Rash resolved but still has pruritus.  Stop probiotic.  Rec 2nd round of medrol dose pack.  Continue claritin and pepcid.  Rec dermatology consult.  Ok to stop omeprazole.    Problem List Items Addressed This Visit             ICD-10-CM    Rash and nonspecific skin eruption - Primary R21    Relevant Medications    methylPREDNISolone (Medrol Dospak) 4 mg tablets    Other Relevant Orders    Referral to Dermatology    Pruritus L29.9    Relevant Medications    methylPREDNISolone (Medrol Dospak) 4 mg tablets    Other Relevant Orders    Referral to Dermatology          "

## 2024-08-12 ENCOUNTER — APPOINTMENT (OUTPATIENT)
Dept: DERMATOLOGY | Facility: CLINIC | Age: 84
End: 2024-08-12
Payer: MEDICARE

## 2024-08-12 DIAGNOSIS — L29.9 BRACHIORADIAL PRURITUS: Primary | ICD-10-CM

## 2024-08-12 DIAGNOSIS — L28.0 LICHEN SIMPLEX CHRONICUS: ICD-10-CM

## 2024-08-12 PROCEDURE — 99204 OFFICE O/P NEW MOD 45 MIN: CPT | Performed by: DERMATOLOGY

## 2024-08-12 PROCEDURE — 1160F RVW MEDS BY RX/DR IN RCRD: CPT | Performed by: DERMATOLOGY

## 2024-08-12 PROCEDURE — 1159F MED LIST DOCD IN RCRD: CPT | Performed by: DERMATOLOGY

## 2024-08-12 PROCEDURE — 1036F TOBACCO NON-USER: CPT | Performed by: DERMATOLOGY

## 2024-08-12 RX ORDER — CLOBETASOL PROPIONATE 0.5 MG/G
CREAM TOPICAL
Qty: 60 G | Refills: 0 | Status: SHIPPED | OUTPATIENT
Start: 2024-08-12

## 2024-08-12 RX ORDER — FAMOTIDINE 20 MG/1
TABLET, FILM COATED ORAL
COMMUNITY

## 2024-08-12 NOTE — PROGRESS NOTES
Subjective     Aure Hdez is a 84 y.o. female who presents for the following: Itching (Back and b/l arms- states history of rash to face and neck post fall and in the hospital in June, states pcp rx medrol pack x 2, pt states finished it Saturday- states resolved rash but states continues to have itching. Tried triamcinolone to back with no response. ).     Review of Systems:  No other skin or systemic complaints other than what is documented elsewhere in the note.    The following portions of the chart were reviewed this encounter and updated as appropriate:   Tobacco  Allergies  Meds  Problems  Med Hx  Surg Hx  Fam Hx         Skin Cancer History  No skin cancer on file.      Specialty Problems          Dermatology Problems    Eczema    Ingrown toenail    Dry skin dermatitis    Rash and nonspecific skin eruption    Pruritus        Objective   Well appearing patient in no apparent distress; mood and affect are within normal limits.    A focused skin examination was performed. All findings within normal limits unless otherwise noted below.    Assessment/Plan   1. Brachioradial pruritus (6)  Left Forearm - Posterior, Left Shoulder - Posterior, Neck - Posterior, Right Forearm - Posterior, Right Shoulder - Posterior, Right Upper Back  Linear excoriations,  no primary skin lesions, scratch test is negative    Patient with persistent pruritus on the back and arms following hospitalization for a severe fall complicated by sub-arachnoid hemorrhage on the occiput. Likely neural pruritus due to recent injury, musculoskeletal change, and inflammation.   -No signs of dermatitis  -Test for dermatographism is negative  -Triamcinolone is unhelpful  -Discussed the nature of the condition  -Recommend neck physical therapy following the patient's injury  -Trial of OTC pramoxine with Cerave Itch relief cream twice daily for 4 weeks to see if this will be helpful. Sample given today.  -Can consider prescription strength  pramoxine (out of pocket cost) if Otc is not helpful    2. Lichen simplex chronicus  Right Forearm - Posterior, Right Upper Arm - Posterior  Lichenified plaques at site of pruritus    -Discussed nature of diagnosis  -Recommend discontinuation of pressure or manipulation of the area  -Topical steroids may help to thin out areas of thickened skin  -Discussed with/information given to the patient on the risks, benefits and alternatives of the usage of topical corticosteroids, including but not limited to: atrophy (thinning of the skin), striae (stretch marks), telangiectasia (blood vessel growth), and dyspigmentation (discoloration of the skin).  -Recommend to limit long-term use of topical corticosteroids to less than 14 days per month to reduce risk of side effects.      clobetasol (Temovate) 0.05 % cream - Right Forearm - Posterior, Right Upper Arm - Posterior  Apply to affected areas of thickened skin on the arms twice daily        Follow up in 2 months for pruritus/LSC  Discussed if there are any changes or development of concerning symptoms (lesion/skin condition is changing, bleeding, enlarging, or worsening) the patient is to contact my office. The patient verbalizes understanding.    Risa Connolly MD  8/12/2024

## 2024-08-29 ENCOUNTER — TELEPHONE (OUTPATIENT)
Dept: PRIMARY CARE | Facility: CLINIC | Age: 84
End: 2024-08-29
Payer: MEDICARE

## 2024-08-29 NOTE — TELEPHONE ENCOUNTER
Pt  called, pt states his wife felt like an elephant sitting on her chest last night and she had a hard time breathing, today she is nauseated, I spoke with Christina medical assistant and she states patient should go to urgent care or er, I relayed message to  he voiced understanding

## 2024-09-09 ENCOUNTER — TELEPHONE (OUTPATIENT)
Dept: CARDIOLOGY | Facility: CLINIC | Age: 84
End: 2024-09-09
Payer: MEDICARE

## 2024-09-11 ENCOUNTER — TELEPHONE (OUTPATIENT)
Dept: PRIMARY CARE | Facility: CLINIC | Age: 84
End: 2024-09-11
Payer: MEDICARE

## 2024-09-11 ENCOUNTER — PATIENT OUTREACH (OUTPATIENT)
Dept: CARE COORDINATION | Facility: CLINIC | Age: 84
End: 2024-09-11
Payer: MEDICARE

## 2024-09-11 NOTE — TELEPHONE ENCOUNTER
Pt called stated she is having trouble breathing and her legs are swelling, wishing to be advised,  I spoke with Lety STEPHEN and she stated pt needs to go to er, I relayed the message to the patient, she voiced understanding

## 2024-09-11 NOTE — PROGRESS NOTES
Outreach call to patient to support a smooth transition of care from recent admission.  Unable to leave a message for patient with my contact information.    ARIELLE Reyes, RN   [phone]   [facility] Department

## 2024-09-12 ENCOUNTER — APPOINTMENT (OUTPATIENT)
Dept: PRIMARY CARE | Facility: CLINIC | Age: 84
End: 2024-09-12
Payer: MEDICARE

## 2024-09-16 ENCOUNTER — DOCUMENTATION (OUTPATIENT)
Dept: CARE COORDINATION | Facility: CLINIC | Age: 84
End: 2024-09-16
Payer: MEDICARE

## 2024-09-17 ENCOUNTER — OFFICE VISIT (OUTPATIENT)
Dept: CARDIOLOGY | Facility: CLINIC | Age: 84
End: 2024-09-17
Payer: MEDICARE

## 2024-09-17 VITALS
BODY MASS INDEX: 24.53 KG/M2 | WEIGHT: 143.7 LBS | SYSTOLIC BLOOD PRESSURE: 132 MMHG | DIASTOLIC BLOOD PRESSURE: 54 MMHG | HEIGHT: 64 IN | HEART RATE: 85 BPM

## 2024-09-17 DIAGNOSIS — E78.2 MIXED HYPERLIPIDEMIA: ICD-10-CM

## 2024-09-17 DIAGNOSIS — R06.2 WHEEZING: ICD-10-CM

## 2024-09-17 DIAGNOSIS — I10 ESSENTIAL (PRIMARY) HYPERTENSION: ICD-10-CM

## 2024-09-17 DIAGNOSIS — Z95.2 S/P TAVR (TRANSCATHETER AORTIC VALVE REPLACEMENT): ICD-10-CM

## 2024-09-17 DIAGNOSIS — I35.0 NONRHEUMATIC AORTIC VALVE STENOSIS: Primary | ICD-10-CM

## 2024-09-17 PROCEDURE — 99215 OFFICE O/P EST HI 40 MIN: CPT | Performed by: NURSE PRACTITIONER

## 2024-09-17 RX ORDER — METOPROLOL TARTRATE 25 MG/1
25 TABLET, FILM COATED ORAL 2 TIMES DAILY
COMMUNITY
Start: 2024-09-13 | End: 2024-10-13

## 2024-09-17 RX ORDER — FUROSEMIDE 20 MG/1
40 TABLET ORAL
COMMUNITY
Start: 2024-09-13 | End: 2024-10-13

## 2024-09-17 RX ORDER — ALBUTEROL SULFATE 90 UG/1
2 INHALANT RESPIRATORY (INHALATION) EVERY 6 HOURS PRN
Qty: 18 G | Refills: 0 | Status: SHIPPED | OUTPATIENT
Start: 2024-09-17 | End: 2025-09-17

## 2024-09-17 RX ORDER — POTASSIUM CHLORIDE 750 MG/1
10 TABLET, EXTENDED RELEASE ORAL
COMMUNITY
Start: 2024-09-13 | End: 2024-10-13

## 2024-09-17 RX ORDER — NAPROXEN SODIUM 220 MG/1
81 TABLET, FILM COATED ORAL DAILY
Start: 2024-09-17 | End: 2025-09-17

## 2024-09-17 RX ORDER — PANTOPRAZOLE SODIUM 40 MG/1
40 TABLET, DELAYED RELEASE ORAL
COMMUNITY
Start: 2024-09-06

## 2024-09-17 NOTE — PROGRESS NOTES
Chief Complaint:   Hospital Follow Up     History Of Present Illness:    Aure Hdez is a 84 y.o. female here for hospital follow up. Was admitted with Covid 19, presumed bacterial pneumonia and hypoxia. She presented with worsening watery diarrhea. Was hypoxic and needed supplemental 02. Troponin elevated 2/2 demand ischemia. Weaned off supplemental 02 prior to discharge. Noted to have Aortic stenosis with worsening regurgitation     9/5/2024  - The left ventricle is mildly dilated. There is no left ventricular hypertrophy.   Left ventricular systolic function is normal. EF = 55 ± 5% (2D 4-ch.) Definity   contrast used for endocardial border detection. Left ventricular diastolic   function was not evaluated due to AVR.   - S/P transcatheter aortic valve replacement. There is moderate (2+) paravalvular aortic valve regurgitation.  ms might suggest severe regurgitation, although visually does not seem severe. There is severe aortic valve stenosis. The peak gradient is 66 mmHg, the mean gradient is 39 mmHg and the dimensionless valve index is 0.38.   - Estimated right ventricular systolic pressure is 41 mmHg consistent with mild   pulmonary hypertension. Estimated right atrial pressure is 15 mmHg based on IVC assessment.   - Left pleural effusion   - Exam was compared with the prior  echocardiographic exam performed on 6/24/2024. Paravalular aortic regurgitation is more prominent. May consider SEN to further investigate it. Left pleural effusion is new. Severe prosthetic valve stenosis is similar to prior study.     Prior to hospitalization gained 10lbs with notable edema.     Since discharge her weights have been stable 142lbs. Remains SOB. She notes wheezing. Cannot lay in bed to sleep due to SOB. Has trialed 2 pillows. Feels better sleeping in chair. Denies LE edema.     Severe AS [Tissue AVR, KIKI ([employer] #[id number])] - 3/16/2018     Allergies:  Lovastatin and Montelukast    Visit Vitals  /54   Pulse 85  "  Ht 1.626 m (5' 4\")   Wt 65.2 kg (143 lb 11.2 oz)   BMI 24.67 kg/m²   OB Status Hysterectomy   Smoking Status Never   BSA 1.72 m²     Review of Systems  All pertinent systems have been reviewed and are negative except for what is stated in the history of present illness.    All other systems have been reviewed and are negative and noncontributory to this patient's current ailments.     Last Labs:  CBC -  Lab Results   Component Value Date    WBC 7.9 07/29/2024    HGB 12.3 07/29/2024    HCT 38.4 07/29/2024    MCV 90 07/29/2024     07/29/2024       CMP -  Lab Results   Component Value Date    CALCIUM 9.1 07/29/2024    PHOS 4.6 03/17/2018    PROT 6.8 07/29/2024    ALBUMIN 4.2 07/29/2024    AST 18 07/29/2024    ALT 16 07/29/2024    ALKPHOS 82 07/29/2024    BILITOT 0.8 07/29/2024    BUN 16 07/29/2024    CREATININE 0.86 07/29/2024       LIPID PANEL -   Lab Results   Component Value Date    CHOL 130 02/08/2024    TRIG 77 02/08/2024    HDL 51.9 02/08/2024    CHHDL 2.5 02/08/2024    LDLF 72 07/15/2023    VLDL 15 02/08/2024    NHDL 78 02/08/2024       RENAL FUNCTION PANEL -   Lab Results   Component Value Date    GLUCOSE 85 07/29/2024     07/29/2024    K 4.2 07/29/2024     07/29/2024    CO2 26 07/29/2024    ANIONGAP 15 07/29/2024    BUN 16 07/29/2024    CREATININE 0.86 07/29/2024    CALCIUM 9.1 07/29/2024    PHOS 4.6 03/17/2018    ALBUMIN 4.2 07/29/2024        No results found for: \"BNP\", \"HGBA1C\"      Objective   Vitals reviewed.   Constitutional:       Appearance: Healthy appearance. Not in distress.   Neck:      Vascular: No JVR. JVD normal.   Pulmonary:      Effort: Pulmonary effort is normal.      Breath sounds: Normal breath sounds. No wheezing. No rhonchi. No rales.   Chest:      Chest wall: Not tender to palpatation.   Cardiovascular:      PMI at left midclavicular line. Normal rate. Regular rhythm. Normal S1. Normal S2.       Murmurs: There is a grade 3/6 midsystolic murmur.      No gallop.  No " click. No rub.   Pulses:     Intact distal pulses.   Edema:     Peripheral edema absent.   Abdominal:      General: Bowel sounds are normal.      Palpations: Abdomen is soft.      Tenderness: There is no abdominal tenderness.   Musculoskeletal: Normal range of motion.         General: No tenderness. Skin:     General: Skin is warm and dry.   Neurological:      General: No focal deficit present.      Mental Status: Alert and oriented to person, place and time.   Psychiatric:         Attention and Perception: Attention normal.         Mood and Affect: Mood normal.       Assessment/Plan   Diagnoses and all orders for this visit:  Nonrheumatic aortic valve stenosis  - Transthoracic echo (TTE) complete Insurance denied for this year annual visit. Echo checked in hospital showed perivalvular leak and severe regurgitation. We will recheck echo in 1 month to re-evaluate gradients when she is euvolemic and not hypoxic.   S/P TAVR (transcatheter aortic valve replacement)  - compliant with abx ppx prior to dental work  - no cardiac complaints   - see above  Essential (primary) hypertension  - Well controlled, no changes. Continue current medications  Mixed hyperlipidemia  - Well controlled, no changes. Continue current medications  Wheezing  - albuterol     Follow up in 1 month    Current Outpatient Medications:     acetaminophen (Tylenol) 325 mg tablet, Take 2 tablets (650 mg) by mouth every 6 hours if needed., Disp: , Rfl:     atorvastatin (Lipitor) 20 mg tablet, TAKE ONE TABLET BY MOUTH EVERY DAY, Disp: 90 tablet, Rfl: 3    cholecalciferol (Vitamin D-3) 125 MCG (5000 UT) capsule, Take 1 capsule (125 mcg) by mouth once daily., Disp: , Rfl:     clobetasol (Temovate) 0.05 % cream, Apply to affected areas of thickened skin on the arms twice daily, Disp: 60 g, Rfl: 0    famotidine (Pepcid) 20 mg tablet, Take by mouth., Disp: , Rfl:     FLUoxetine (PROzac) 20 mg capsule, TAKE ONE CAPSULE BY MOUTH EVERY DAY, Disp: 90 capsule, Rfl:  1    loratadine (Claritin) 10 mg tablet, Take 1 tablet (10 mg) by mouth once daily., Disp: , Rfl:     Exclusive of any other services or procedures performed, I, Sweetie CORTES, spent 40 minutes in duration for this visit today.  This time consisted of chart review, obtaining history, and/or performing the exam as documented above, as well as, documenting the clinical information for the encounter in the electronic record, discussing treatment options, plans, and/or goals with patient, family, and/or caregiver, refilling medications, updating the electronic record, ordering medicines, lab work, imaging, referrals, and/or procedures as documented above and communicating with other [facility] professionals. I have discussed the results of laboratory, radiology, and cardiology studies with the patient and their family/caregiver.

## 2024-09-18 ENCOUNTER — PATIENT OUTREACH (OUTPATIENT)
Dept: CARE COORDINATION | Facility: CLINIC | Age: 84
End: 2024-09-18
Payer: MEDICARE

## 2024-09-19 ENCOUNTER — OFFICE VISIT (OUTPATIENT)
Dept: PRIMARY CARE | Facility: CLINIC | Age: 84
End: 2024-09-19
Payer: MEDICARE

## 2024-09-19 VITALS
WEIGHT: 143.8 LBS | HEART RATE: 71 BPM | HEIGHT: 64 IN | BODY MASS INDEX: 24.55 KG/M2 | DIASTOLIC BLOOD PRESSURE: 54 MMHG | SYSTOLIC BLOOD PRESSURE: 134 MMHG | OXYGEN SATURATION: 96 %

## 2024-09-19 DIAGNOSIS — Z09 HOSPITAL DISCHARGE FOLLOW-UP: Primary | ICD-10-CM

## 2024-09-19 DIAGNOSIS — R06.02 SHORTNESS OF BREATH: ICD-10-CM

## 2024-09-19 RX ORDER — ALBUTEROL SULFATE 1.25 MG/3ML
1.25 SOLUTION RESPIRATORY (INHALATION) EVERY 4 HOURS PRN
Qty: 3 ML | Refills: 1 | Status: SHIPPED | OUTPATIENT
Start: 2024-09-19 | End: 2025-09-19

## 2024-09-19 RX ORDER — PEN NEEDLE, DIABETIC 31 GX5/16"
1 NEEDLE, DISPOSABLE MISCELLANEOUS SEE ADMIN INSTRUCTIONS
Qty: 1 EACH | Refills: 0 | Status: SHIPPED | OUTPATIENT
Start: 2024-09-19

## 2024-09-19 ASSESSMENT — ENCOUNTER SYMPTOMS
SINUS PAIN: 0
FATIGUE: 1
SLEEP DISTURBANCE: 1
COUGH: 1
WHEEZING: 0
GASTROINTESTINAL NEGATIVE: 1
CHEST TIGHTNESS: 0
LIGHT-HEADEDNESS: 0
SINUS PRESSURE: 0
SHORTNESS OF BREATH: 1
NERVOUS/ANXIOUS: 1
DIZZINESS: 0

## 2024-09-19 NOTE — PATIENT INSTRUCTIONS
Printed order for nebulizer and solution to use within nebulizer  Let office know if wishing to increase Fluoxetine for increased anxiety.  Melatonin prior to bed to help you sleep (3 mg- 5 mg)

## 2024-09-19 NOTE — PROGRESS NOTES
Subjective   Patient ID: Aure Hdez is a 84 y.o. female who presents for Hospital Follow-up (RB pt here for a hospital follow up. /LOV with RB 8/5/24/[facility] 9/11/24 to 9/13/24/Final Dx: Fluid Overload./Seen cardiology 9/17/24/NOV with RB 11/6/24 3m).    HPI   Patient of Dr Tovar here for hospital follow up. Last office visit on 11/06/2023. She is in company of daughter  (Geeta) and  in exam room  Current concern:  1) hospital follow up 09/11/2024- 09/13/2024 for evaluation of worsening SOB on exertion and bilateral lower extremity edema. CT chest bilateral pleural effusion. Started on Furosemide 20 mg, improvement of symptoms.   Also started on Metoprolol Tartrate 25 mg bid, potassium 10 mEq every day. Continue on Atorvastatin 20 mg, Fluoxetine 20 mg, Pantoprazole 40 mg. Seen by cardiology 09/17/2024 noted worsening aortic stenosis with worsening regurgitation. Cardiologist restarted ASA.   Today reports feeling sob especially when trying to sleep, now sleeping in reclining chair with Head up 45% to help with breathing. Using inhaler throughout day and especially at night prior to going to bed without much improvement.   Chronic Concerns; CAD, Depression, GERD, Mixed hyperlipidemia, Insomnia, Hypokalemia, elevated liver enzymes.  Specialist  - cardiology   Labs while hospitalized.     Review of Systems   Constitutional:  Positive for fatigue.   HENT:  Positive for congestion. Negative for sinus pressure and sinus pain.    Respiratory:  Positive for cough (dry) and shortness of breath. Negative for chest tightness and wheezing.    Cardiovascular:  Negative for chest pain and leg swelling.   Gastrointestinal: Negative.         Appetite is not normal   Genitourinary: Negative.    Neurological:  Negative for dizziness and light-headedness.   Psychiatric/Behavioral:  Positive for sleep disturbance. The patient is nervous/anxious.      Objective   /54 (BP Location: Left arm, Patient Position:  "Sitting, BP Cuff Size: Adult)   Pulse 71   Ht 1.626 m (5' 4\")   Wt 65.2 kg (143 lb 12.8 oz)   SpO2 96%   BMI 24.68 kg/m²   Weight at discharge 148.6 lbs    Physical Exam  Vitals reviewed.   Constitutional:       Appearance: She is normal weight.   Neck:      Vascular: No carotid bruit.   Cardiovascular:      Rate and Rhythm: Normal rate and regular rhythm.      Heart sounds: Normal heart sounds.   Pulmonary:      Effort: Pulmonary effort is normal.      Breath sounds: Normal breath sounds. No decreased breath sounds, wheezing or rhonchi.   Musculoskeletal:      Cervical back: Neck supple.      Right lower leg: No edema.      Left lower leg: No edema.   Skin:     General: Skin is warm.      Findings: No rash.   Neurological:      General: No focal deficit present.      Mental Status: She is alert.   Psychiatric:         Attention and Perception: Attention normal.         Behavior: Behavior normal. Behavior is cooperative.       Assessment/Plan   Diagnoses and all orders for this visit:  Hospital discharge follow-up  / Shortness of breath  - printed  nebulizers misc; 1 Device see administration instructions. Compressor and nebulizer kit (tubing and mouth piece)  -  printed   albuterol 1.25 mg/3 mL nebulizer solution; Take 3 mL (1.25 mg) by nebulization every 4 hours if needed for wheezing or shortness of breath (cough). Enough per box for 30 days if using 120 packets  Recommended deep breathing throughout the day,    Discussed with patient anxiety assoc with SOB, at this time doing okay with current dose of Fluoxetine (20 mg) discussed increase in dose if necessary.   Patient declined Disability placard rx.     PLAN: Follow up with Dr Tovar as scheduled   "

## 2024-09-23 ENCOUNTER — TELEPHONE (OUTPATIENT)
Dept: CARDIOLOGY | Facility: CLINIC | Age: 84
End: 2024-09-23
Payer: MEDICARE

## 2024-09-23 ENCOUNTER — TELEPHONE (OUTPATIENT)
Dept: PRIMARY CARE | Facility: CLINIC | Age: 84
End: 2024-09-23
Payer: MEDICARE

## 2024-09-23 NOTE — TELEPHONE ENCOUNTER
Patient and wife still not feeling the greatest from the covid from 9/5  ,they feel good one day and the next they feel drain, only a little coughing  no fever no sore throat, just lack of energy, would like to know how long they are contagious,  they are wishing to be advised

## 2024-10-01 ENCOUNTER — PATIENT OUTREACH (OUTPATIENT)
Dept: CARE COORDINATION | Facility: CLINIC | Age: 84
End: 2024-10-01
Payer: MEDICARE

## 2024-10-01 NOTE — PROGRESS NOTES
Outreach call to patient to support a smooth transition of care from recent admission.  Left voicemail message for patient with my contact information.    ARIELLE Reyes, RN   [phone]   [facility] Department

## 2024-10-08 ENCOUNTER — TELEPHONE (OUTPATIENT)
Dept: PRIMARY CARE | Facility: CLINIC | Age: 84
End: 2024-10-08
Payer: MEDICARE

## 2024-10-08 DIAGNOSIS — R06.02 SHORTNESS OF BREATH: ICD-10-CM

## 2024-10-08 NOTE — TELEPHONE ENCOUNTER
Pt called in, when she was discharged from the hospital in September she was placed on:    Metoprolol  Furosemide  Pantoprazole  Potassium    And she would like to know if she can DC any or all of these?  She didn't know if they were to be long term or not

## 2024-10-09 ENCOUNTER — TELEPHONE (OUTPATIENT)
Dept: PRIMARY CARE | Facility: CLINIC | Age: 84
End: 2024-10-09
Payer: MEDICARE

## 2024-10-09 ENCOUNTER — TELEPHONE (OUTPATIENT)
Dept: CARDIOLOGY | Facility: CLINIC | Age: 84
End: 2024-10-09
Payer: MEDICARE

## 2024-10-09 DIAGNOSIS — R06.02 SHORTNESS OF BREATH: Primary | ICD-10-CM

## 2024-10-09 RX ORDER — ALBUTEROL SULFATE 1.25 MG/3ML
1.25 SOLUTION RESPIRATORY (INHALATION) EVERY 4 HOURS PRN
Qty: 3 ML | Refills: 1 | Status: SHIPPED | OUTPATIENT
Start: 2024-10-09 | End: 2025-10-09

## 2024-10-09 RX ORDER — PEN NEEDLE, DIABETIC 31 GX5/16"
1 NEEDLE, DISPOSABLE MISCELLANEOUS SEE ADMIN INSTRUCTIONS
Qty: 1 EACH | Refills: 0 | Status: SHIPPED | OUTPATIENT
Start: 2024-10-09

## 2024-10-09 NOTE — TELEPHONE ENCOUNTER
Pt called stating she took the order for the nebulizer and the solution to [facility] in [place] this Monday but there's an issue with the order.  After calling [facility], The pharmacy Tech informed me that they do not carry DME supplies like this and our best bet would be to send these to [facility].     LOV with you 9/19/24  NOV with RB 11/26/24

## 2024-10-10 ENCOUNTER — LAB (OUTPATIENT)
Dept: LAB | Facility: LAB | Age: 84
End: 2024-10-10
Payer: MEDICARE

## 2024-10-10 DIAGNOSIS — R06.02 SHORTNESS OF BREATH: ICD-10-CM

## 2024-10-11 LAB
ANION GAP SERPL CALC-SCNC: 14 MMOL/L (ref 10–20)
BASOPHILS # BLD AUTO: 0.06 X10*3/UL (ref 0–0.1)
BASOPHILS NFR BLD AUTO: 0.6 %
BNP SERPL-MCNC: 1151 PG/ML (ref 0–99)
BUN SERPL-MCNC: 19 MG/DL (ref 6–23)
CALCIUM SERPL-MCNC: 9.2 MG/DL (ref 8.6–10.6)
CHLORIDE SERPL-SCNC: 102 MMOL/L (ref 98–107)
CO2 SERPL-SCNC: 27 MMOL/L (ref 21–32)
CREAT SERPL-MCNC: 1.16 MG/DL (ref 0.5–1.05)
EGFRCR SERPLBLD CKD-EPI 2021: 46 ML/MIN/1.73M*2
EOSINOPHIL # BLD AUTO: 0.18 X10*3/UL (ref 0–0.4)
EOSINOPHIL NFR BLD AUTO: 1.8 %
ERYTHROCYTE [DISTWIDTH] IN BLOOD BY AUTOMATED COUNT: 14 % (ref 11.5–14.5)
GLUCOSE SERPL-MCNC: 91 MG/DL (ref 74–99)
HCT VFR BLD AUTO: 36.1 % (ref 36–46)
HGB BLD-MCNC: 11.6 G/DL (ref 12–16)
IMM GRANULOCYTES # BLD AUTO: 0.03 X10*3/UL (ref 0–0.5)
IMM GRANULOCYTES NFR BLD AUTO: 0.3 % (ref 0–0.9)
LYMPHOCYTES # BLD AUTO: 0.99 X10*3/UL (ref 0.8–3)
LYMPHOCYTES NFR BLD AUTO: 9.9 %
MCH RBC QN AUTO: 28.9 PG (ref 26–34)
MCHC RBC AUTO-ENTMCNC: 32.1 G/DL (ref 32–36)
MCV RBC AUTO: 90 FL (ref 80–100)
MONOCYTES # BLD AUTO: 0.69 X10*3/UL (ref 0.05–0.8)
MONOCYTES NFR BLD AUTO: 6.9 %
NEUTROPHILS # BLD AUTO: 8.08 X10*3/UL (ref 1.6–5.5)
NEUTROPHILS NFR BLD AUTO: 80.5 %
NRBC BLD-RTO: 0 /100 WBCS (ref 0–0)
PLATELET # BLD AUTO: 192 X10*3/UL (ref 150–450)
POTASSIUM SERPL-SCNC: 4.6 MMOL/L (ref 3.5–5.3)
RBC # BLD AUTO: 4.02 X10*6/UL (ref 4–5.2)
SODIUM SERPL-SCNC: 138 MMOL/L (ref 136–145)
WBC # BLD AUTO: 10 X10*3/UL (ref 4.4–11.3)

## 2024-10-14 NOTE — TELEPHONE ENCOUNTER
Pt contacted with lab results, pt to remain to on medications prescribed at hospital, pt verbalized understanding

## 2024-10-24 ENCOUNTER — PATIENT OUTREACH (OUTPATIENT)
Dept: CARE COORDINATION | Facility: CLINIC | Age: 84
End: 2024-10-24
Payer: MEDICARE

## 2024-10-24 NOTE — PROGRESS NOTES
Second attempt made to reach patient for transitions of care outreach call  and no contact made with patient. Left voicemail message with my name and contact information. Will enroll patient in transtions of care and outreach again to follow up on PCP appointment.     ARIELLE Reyes, RN   [phone]   AC[facility] Department

## 2024-10-28 ENCOUNTER — HOSPITAL ENCOUNTER (OUTPATIENT)
Dept: CARDIOLOGY | Facility: CLINIC | Age: 84
Discharge: HOME | End: 2024-10-28
Payer: MEDICARE

## 2024-10-28 ENCOUNTER — OFFICE VISIT (OUTPATIENT)
Dept: CARDIOLOGY | Facility: CLINIC | Age: 84
End: 2024-10-28
Payer: MEDICARE

## 2024-10-28 VITALS
WEIGHT: 139 LBS | DIASTOLIC BLOOD PRESSURE: 55 MMHG | BODY MASS INDEX: 23.86 KG/M2 | SYSTOLIC BLOOD PRESSURE: 160 MMHG | HEART RATE: 93 BPM

## 2024-10-28 DIAGNOSIS — Z95.2 S/P TAVR (TRANSCATHETER AORTIC VALVE REPLACEMENT): ICD-10-CM

## 2024-10-28 DIAGNOSIS — E78.2 MIXED HYPERLIPIDEMIA: ICD-10-CM

## 2024-10-28 DIAGNOSIS — I10 ESSENTIAL (PRIMARY) HYPERTENSION: ICD-10-CM

## 2024-10-28 DIAGNOSIS — I35.0 NONRHEUMATIC AORTIC VALVE STENOSIS: Primary | ICD-10-CM

## 2024-10-28 DIAGNOSIS — R06.2 WHEEZING: ICD-10-CM

## 2024-10-28 DIAGNOSIS — I35.0 NONRHEUMATIC AORTIC VALVE STENOSIS: ICD-10-CM

## 2024-10-28 DIAGNOSIS — R06.02 SHORTNESS OF BREATH: ICD-10-CM

## 2024-10-28 LAB
AORTIC VALVE MEAN GRADIENT: 41.7 MMHG
AORTIC VALVE PEAK VELOCITY: 4.48 M/S
AV PEAK GRADIENT: 80.2 MMHG
EJECTION FRACTION APICAL 4 CHAMBER: 56.7
EJECTION FRACTION: 56 %
LEFT ATRIUM VOLUME AREA LENGTH INDEX BSA: 42.4 ML/M2
LEFT VENTRICLE INTERNAL DIMENSION DIASTOLE: 4.46 CM (ref 3.5–6)
MITRAL VALVE E/A RATIO: 1.29
RIGHT VENTRICLE FREE WALL PEAK S': 13 CM/S
RIGHT VENTRICLE PEAK SYSTOLIC PRESSURE: 74.4 MMHG
TRICUSPID ANNULAR PLANE SYSTOLIC EXCURSION: 2.7 CM

## 2024-10-28 PROCEDURE — 93306 TTE W/DOPPLER COMPLETE: CPT

## 2024-10-28 PROCEDURE — 99215 OFFICE O/P EST HI 40 MIN: CPT | Performed by: NURSE PRACTITIONER

## 2024-10-28 RX ORDER — FLUTICASONE PROPIONATE 50 MCG
1 SPRAY, SUSPENSION (ML) NASAL DAILY
Qty: 16 G | Refills: 12 | Status: SHIPPED | OUTPATIENT
Start: 2024-10-28 | End: 2025-10-28

## 2024-10-28 RX ORDER — TIOTROPIUM BROMIDE 18 UG/1
1 CAPSULE ORAL; RESPIRATORY (INHALATION)
Qty: 30 CAPSULE | Refills: 11 | Status: SHIPPED | OUTPATIENT
Start: 2024-10-28 | End: 2025-10-28

## 2024-10-28 RX ORDER — CETIRIZINE HYDROCHLORIDE 10 MG/1
10 TABLET ORAL EVERY EVENING
Qty: 90 TABLET | Refills: 3 | Status: SHIPPED | OUTPATIENT
Start: 2024-10-28 | End: 2025-10-28

## 2024-10-29 ENCOUNTER — TELEPHONE (OUTPATIENT)
Dept: PRIMARY CARE | Facility: CLINIC | Age: 84
End: 2024-10-29
Payer: MEDICARE

## 2024-11-04 ENCOUNTER — PATIENT OUTREACH (OUTPATIENT)
Dept: CARE COORDINATION | Facility: CLINIC | Age: 84
End: 2024-11-04
Payer: MEDICARE

## 2024-11-04 NOTE — PROGRESS NOTES
Outreach call to patient to follow up after 30 days of hospital discharge. Unable to connect with patient. Will dis enroll patient from transitions of care.     ARIELLE Reyes, RN   [phone]   ACO Department

## 2024-11-05 ENCOUNTER — APPOINTMENT (OUTPATIENT)
Dept: PRIMARY CARE | Facility: CLINIC | Age: 84
End: 2024-11-05
Payer: MEDICARE

## 2024-11-05 ENCOUNTER — TELEPHONE (OUTPATIENT)
Dept: CARDIOLOGY | Facility: CLINIC | Age: 84
End: 2024-11-05

## 2024-11-05 VITALS
SYSTOLIC BLOOD PRESSURE: 156 MMHG | HEART RATE: 88 BPM | HEIGHT: 64 IN | WEIGHT: 136 LBS | BODY MASS INDEX: 23.22 KG/M2 | OXYGEN SATURATION: 98 % | DIASTOLIC BLOOD PRESSURE: 60 MMHG

## 2024-11-05 DIAGNOSIS — I35.1 NONRHEUMATIC AORTIC VALVE INSUFFICIENCY: ICD-10-CM

## 2024-11-05 DIAGNOSIS — I34.0 NONRHEUMATIC MITRAL VALVE REGURGITATION: ICD-10-CM

## 2024-11-05 DIAGNOSIS — I50.30 DIASTOLIC HEART FAILURE, UNSPECIFIED HF CHRONICITY: Primary | ICD-10-CM

## 2024-11-05 DIAGNOSIS — J90 PLEURAL EFFUSION: ICD-10-CM

## 2024-11-05 ASSESSMENT — ENCOUNTER SYMPTOMS
WHEEZING: 0
SHORTNESS OF BREATH: 1
ABDOMINAL PAIN: 0
DIZZINESS: 1
FATIGUE: 1

## 2024-11-05 NOTE — PROGRESS NOTES
"Subjective   Patient ID: Aure Hdez is a 84 y.o. female who presents for OTHER (Still can't breathe easily, still coughing) chronic medical problems.    6-2024 Admitted with subdural hematoma.  8-2024 Admitted with CP and pneumonia.  9-2024 Admitted with covid pneumonia and GI bleed.  9-2024 Admitted for fluid overload.    9- CT chest, negative for PE, moderate bilateral pleural effusions.  10- Labs ordered by cardiology.  BNP 1,151  GFR 46, down from 66 and 76.  10-28-24 Echo  Moderate MR  Moderate AR  Large pleural effusion    1-5-2024  Resting: HR 81, O2 96% at 11:56am  Walking: , O2 96% at 11.57am  Stopped, fatigue: , O2 92% at 11:58am    SOB with improvement with nebulizer for about 1 hour.  Taking albuterol treatments twice daily.    Has appt with pulmonary next week.    Records, meds and labs reviewed.       Review of Systems   Constitutional:  Positive for fatigue.   Respiratory:  Positive for shortness of breath. Negative for wheezing.    Cardiovascular:  Negative for chest pain.   Gastrointestinal:  Negative for abdominal pain.   Neurological:  Positive for dizziness.       Objective   /60 (BP Location: Left arm, Patient Position: Sitting)   Pulse 88   Ht 1.626 m (5' 4\")   Wt 61.7 kg (136 lb)   SpO2 98%   BMI 23.34 kg/m²     Physical Exam  Constitutional:       Appearance: Normal appearance.   Cardiovascular:      Rate and Rhythm: Normal rate and regular rhythm.      Pulses: Normal pulses.      Heart sounds: Murmur heard.   Pulmonary:      Effort: Pulmonary effort is normal.      Comments: BS clear, decreased in bases.  Neurological:      General: No focal deficit present.      Mental Status: She is alert.   Psychiatric:         Mood and Affect: Mood normal.         Behavior: Behavior normal.         Thought Content: Thought content normal.         Judgment: Judgment normal.         Assessment/Plan   Medical records reviewed.  Labs suggestive of CHF.  Echo shows " moderate AR/AS and MR.  Case reviewed with cardiology.  Rec no treatment for BP pending evaluation with TAVR team.  Pt notified and will fu with cardiology and pulmonary.    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

## 2024-11-06 ENCOUNTER — APPOINTMENT (OUTPATIENT)
Dept: PRIMARY CARE | Facility: CLINIC | Age: 84
End: 2024-11-06
Payer: MEDICARE

## 2024-11-12 ENCOUNTER — OFFICE VISIT (OUTPATIENT)
Dept: PULMONOLOGY | Facility: HOSPITAL | Age: 84
End: 2024-11-12
Payer: MEDICARE

## 2024-11-12 ENCOUNTER — TELEPHONE (OUTPATIENT)
Dept: PULMONOLOGY | Facility: HOSPITAL | Age: 84
End: 2024-11-12

## 2024-11-12 ENCOUNTER — APPOINTMENT (OUTPATIENT)
Dept: PRIMARY CARE | Facility: CLINIC | Age: 84
End: 2024-11-12
Payer: MEDICARE

## 2024-11-12 ENCOUNTER — HOSPITAL ENCOUNTER (OUTPATIENT)
Dept: RADIOLOGY | Facility: HOSPITAL | Age: 84
Discharge: HOME | End: 2024-11-12
Payer: MEDICARE

## 2024-11-12 VITALS
HEIGHT: 64 IN | WEIGHT: 136 LBS | OXYGEN SATURATION: 98 % | DIASTOLIC BLOOD PRESSURE: 53 MMHG | SYSTOLIC BLOOD PRESSURE: 159 MMHG | TEMPERATURE: 97.4 F | BODY MASS INDEX: 23.22 KG/M2 | HEART RATE: 80 BPM | RESPIRATION RATE: 16 BRPM

## 2024-11-12 DIAGNOSIS — R06.02 SHORTNESS OF BREATH: ICD-10-CM

## 2024-11-12 DIAGNOSIS — Z95.2 S/P TAVR (TRANSCATHETER AORTIC VALVE REPLACEMENT): ICD-10-CM

## 2024-11-12 DIAGNOSIS — I50.32 CHRONIC DIASTOLIC HEART FAILURE: Primary | ICD-10-CM

## 2024-11-12 DIAGNOSIS — I50.32 CHRONIC DIASTOLIC HEART FAILURE: ICD-10-CM

## 2024-11-12 PROCEDURE — 71046 X-RAY EXAM CHEST 2 VIEWS: CPT

## 2024-11-12 PROCEDURE — 99215 OFFICE O/P EST HI 40 MIN: CPT | Mod: 25 | Performed by: NURSE PRACTITIONER

## 2024-11-12 ASSESSMENT — ENCOUNTER SYMPTOMS
SHORTNESS OF BREATH: 1
RHINORRHEA: 0
UNEXPECTED WEIGHT CHANGE: 0
CHILLS: 0
FATIGUE: 0
WHEEZING: 0
COUGH: 1
FEVER: 0

## 2024-11-12 NOTE — PROGRESS NOTES
Subjective   Patient ID: Aure Hdez is a 84 y.o. female who presents for NPV for shortness of breath.     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 She was referred by cardiology NP for persistent shortness of breath. She states that her shortness of breath started to progressively worsen after being hospitalized in September at [facility] for COVID and was also treated for pneumonia at the time. She ended up readmitted again later in the month for acute on chronic diastolic heart failure and was told to follow up closely with her cardiologist. She states that she has been off of Lasix since she was admitted to the hospital but she is unsure why. She was told by cardiology that she should get PFTs was started on Spiriva and was recommended to start pulmonary rehab. She has never smoked and has never been diagnosed with COPD or asthma. She states that she was exposed to second hand smoke in the past. She has albuterol that she states helps only some. Her ECHO is significantly abnormal. She states that she is short of breath on any exertion and just is wanting to get answers. She does report a dry cough. She has no other concerns.     Review of Systems   Constitutional:  Negative for chills, fatigue, fever and unexpected weight change.   HENT:  Negative for congestion, postnasal drip and rhinorrhea.    Respiratory:  Positive for cough (denies hemoptysis.) and shortness of breath. Negative for wheezing.    Cardiovascular:  Negative for chest pain and leg swelling.   All other systems reviewed and are negative.      Objective   Physical Exam  Vitals reviewed.   Constitutional:       Appearance: Normal appearance.   HENT:      Head: Normocephalic.   Cardiovascular:      Rate and Rhythm: Normal rate and regular rhythm.   Pulmonary:      Effort: Pulmonary effort is normal.      " Breath sounds: Examination of the left-middle field reveals decreased breath sounds. Examination of the left-lower field reveals decreased breath sounds. Decreased breath sounds present.   Skin:     General: Skin is warm and dry.   Neurological:      Mental Status: She is alert.         Assessment/Plan   Chronic diastolic heart failure  Aortic stenosis s/p TAVR  Shortness of breath     Plan:    Patient was referred to pulmonary from cardiology for shortness of breath. She was admitted to [facility] in early September and treated for COVID and pneumonia. I do not have the images but impression on 9/2/24 reads \"small bilateral pleural effusions with bibasilar atelectasis or pneumonia\" patient had COVID at the time and was treated for pneumonia. She then ended up readmitted later in the month and was treated for acute on chronic diastolic heart failure and was told to follow up closely with cardiology. CXR done 9/11/24 showed small bilateral pleural effusion each slightly increased in size and BNP was significantly elevated. CT chest also done 9/11/24 showed moderate bilateral pleural effusion with adjacent compressive atelectasis. There is no concern for pneumonia on CT chest.     I discussed results with patient at length and reviewed her ECHO which was significantly abnormal. She has chronic diastolic heart failure, a large pleural effusion, increased gradients of TAVR valve. There is severely elevated pulmonary artery pressure, and she is not currently on diuretics and the reason for this is unclear to me, patient states that she has not taken Lasix since hospitalization in September. Her shortness of breath is very likely secondary to uncontrolled heart failure her BNP on 10/10/24 is 1,151. Kidney function is adequate and she denies ETOH consumption. I also discussed her case with cardiologist here who agrees that she would benefit with diuresis and also suspects issues with her valve. I got patient an " appointment with Dr. Greenfield set up on 11/18 as patient states that her current cardiologist continues to tell her that her shortness of breath is from her lungs and ordered Spiriva and albuterol. In her note PFTs and pulmonary rehab were recommended. I explained to patient that I do not recommend this for her at this time as she has obvious cardiac etiologies which I suspect is causing her shortness of breath. Her lungs are clear other than diminished on left mid/lower. Also no diagnosis currently will cover pulmonary rehab.     I got a STAT CXR for her and this showed that she has small bilateral pleural effusions, I let the patient know that these are too small for thoracentesis at this time. She is agreeable to get set up with Dr. Greenfield for further management next week. I explained to her that after her cardiac etiologies are managed and if she is still having symptoms I will be happy to see her an undergo complete pulmonary workup but currently no s/s of any chronic lung disease. I do not recommend Spiriva at this time but she can continue albuterol prn if it helps. She is agreeable to plan of care.     Total time:  60 min.

## 2024-11-12 NOTE — PATIENT INSTRUCTIONS
Please get Chest X-ray done today.   I will call you with results and arrange for follow up.   If you do not hear from Dr. Greenfield's office by Friday please call [phone].

## 2024-11-12 NOTE — TELEPHONE ENCOUNTER
Patient acknowledged understanding. All questions were answered at this time.     ----- Message from Christina Schneider sent at 11/12/2024  3:22 PM EST -----  Please call the patient and let her know that she has pleural effusions on her CXR but not large enough to be drained at this time they are small. We will keep current plan of care and have her see Dr. Greenfield, I saw they already scheduled her for 11/18. Have her call me with any questions or concerns. I will continue to follow up and if she needs to see me again after cardiac etiologies are optimized I will schedule her.

## 2024-11-14 DIAGNOSIS — Z95.2 S/P TAVR (TRANSCATHETER AORTIC VALVE REPLACEMENT): Primary | ICD-10-CM

## 2024-11-14 DIAGNOSIS — I50.32 CHRONIC DIASTOLIC HEART FAILURE: ICD-10-CM

## 2024-11-16 PROBLEM — S06.5XAA SDH (SUBDURAL HEMATOMA) (MULTI): Status: ACTIVE | Noted: 2024-06-24

## 2024-11-16 PROBLEM — J18.9 PNEUMONIA DUE TO INFECTIOUS ORGANISM: Status: ACTIVE | Noted: 2024-08-29

## 2024-11-16 PROBLEM — R42 DIZZINESS: Status: ACTIVE | Noted: 2024-11-16

## 2024-11-16 PROBLEM — S79.929A INJURY OF THIGH: Status: ACTIVE | Noted: 2023-08-02

## 2024-11-16 PROBLEM — R07.89 ATYPICAL CHEST PAIN: Status: ACTIVE | Noted: 2024-08-30

## 2024-11-16 PROBLEM — M79.602 CHRONIC PAIN OF LEFT UPPER EXTREMITY: Status: ACTIVE | Noted: 2024-11-16

## 2024-11-16 PROBLEM — R05.9 COUGH: Status: ACTIVE | Noted: 2024-11-16

## 2024-11-16 PROBLEM — T82.09XA PROSTHETIC VALVE DYSFUNCTION: Status: ACTIVE | Noted: 2024-09-12

## 2024-11-16 PROBLEM — J96.01 ACUTE HYPOXEMIC RESPIRATORY FAILURE (MULTI): Status: ACTIVE | Noted: 2024-09-12
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
## 2024-11-16 PROBLEM — I50.33 ACUTE ON CHRONIC DIASTOLIC CONGESTIVE HEART FAILURE: Status: ACTIVE | Noted: 2024-09-12

## 2024-11-16 PROBLEM — Z86.59 HISTORY OF MENTAL DISORDER: Status: ACTIVE | Noted: 2024-11-16

## 2024-11-16 PROBLEM — E78.5 HYPERLIPIDEMIA: Status: ACTIVE | Noted: 2023-07-11

## 2024-11-16 PROBLEM — T14.90XA TRAUMA: Status: ACTIVE | Noted: 2024-06-23

## 2024-11-16 PROBLEM — I35.1 AORTIC VALVE REGURGITATION: Status: ACTIVE | Noted: 2024-09-12

## 2024-11-16 PROBLEM — U07.1 COVID-19: Status: ACTIVE | Noted: 2024-09-03

## 2024-11-16 PROBLEM — G89.29 CHRONIC PAIN OF LEFT UPPER EXTREMITY: Status: ACTIVE | Noted: 2024-11-16

## 2024-11-18 ENCOUNTER — OFFICE VISIT (OUTPATIENT)
Dept: CARDIOLOGY | Facility: HOSPITAL | Age: 84
End: 2024-11-18
Payer: MEDICARE

## 2024-11-18 ENCOUNTER — TELEPHONE (OUTPATIENT)
Dept: PRIMARY CARE | Facility: CLINIC | Age: 84
End: 2024-11-18

## 2024-11-18 ENCOUNTER — TELEPHONE (OUTPATIENT)
Dept: CARDIOLOGY | Facility: CLINIC | Age: 84
End: 2024-11-18
Payer: MEDICARE

## 2024-11-18 ENCOUNTER — HOSPITAL ENCOUNTER (INPATIENT)
Facility: HOSPITAL | Age: 84
End: 2024-11-18
Attending: INTERNAL MEDICINE | Admitting: INTERNAL MEDICINE
Payer: MEDICARE

## 2024-11-18 VITALS
HEIGHT: 64 IN | SYSTOLIC BLOOD PRESSURE: 128 MMHG | DIASTOLIC BLOOD PRESSURE: 56 MMHG | BODY MASS INDEX: 23.05 KG/M2 | HEART RATE: 88 BPM | WEIGHT: 135 LBS

## 2024-11-18 DIAGNOSIS — I50.31 ACUTE DIASTOLIC HEART FAILURE: Primary | ICD-10-CM

## 2024-11-18 PROCEDURE — 99214 OFFICE O/P EST MOD 30 MIN: CPT | Performed by: INTERNAL MEDICINE

## 2024-11-18 PROCEDURE — 93005 ELECTROCARDIOGRAM TRACING: CPT | Performed by: INTERNAL MEDICINE

## 2024-11-18 ASSESSMENT — ENCOUNTER SYMPTOMS
OCCASIONAL FEELINGS OF UNSTEADINESS: 0
DYSPNEA ON EXERTION: 1
COUGH: 1
DEPRESSION: 1
LOSS OF SENSATION IN FEET: 0

## 2024-11-18 NOTE — TELEPHONE ENCOUNTER
Pt stopped in after seeing Dr Greenfield, Dr Greenfield is instructing patient she should go to hospital that she is In CHF, pt didn't want to go to [place], she states either she will go to [facility] or [facility], patient is waiting for Dr Greenfield to call her back he was going to instruct her on which one she should go to.   Pt stopped in to let you know

## 2024-11-18 NOTE — PATIENT INSTRUCTIONS
Dr. Greenfield has recommended that you be admitted to hospital. Dr. Greenfield has sent a message to Dr. Preciado to see how we should best proceed to get you admitted to hospital at the [facility]. Once we receive notification from Dr. Preciado, our office will contact you with instructions.

## 2024-11-18 NOTE — LETTER
"November 18, 2024     Rito Tovar MD  [address]  [facility], [address]  [facility] [address]    Patient: Aure Hdez   YOB: 1940   Date of Visit: 11/18/2024       Dear Dr. Rito Tovar MD:    Thank you for referring Aure Hdez to me for evaluation. Below are my notes for this consultation.  If you have questions, please do not hesitate to call me. I look forward to following your patient along with you.       Sincerely,     Cl Greenfield MD      CC: No Recipients  ______________________________________________________________________________________    Counseling:  The patient was counseled regarding diagnostic results, instructions for management, risk factor reductions, prognosis, patient and family education, impressions, risks and benefits of treatment options and importance of compliance with treatment.      Chief Complaint:  The patient presents today for cardiovascular evaluation of persistent SOB in a patient with h/o aortic stenosis s/p TAVR and diastolic heart failure.     History Of Present Illness:    Aure Hdez is a 84 y.o. female patient whose PMH is significant for CAD, LBBB, aortic stenosis s/p TAVR 03/16/2018, HTN, diastolic heart failure, mitral regurgitation, GERD, depression, SDH, and syncope. She presents today in the company of her  to establish cardiovascular care for a second opinion of persistent SOB. The patient underwent TAVR on 03/16/2018 and has been followed by Dr. Preciado and Sweetie Dubno, APRN-CNP. She was admitted to [facility] in early September with COVID pneumonia, with diagnostic imaging impression showing \"small bilateral pleural effusions with bibasilar atelectasis or pneumonia.\" The patient was readmitted to hospital later that month with acute on chronic diastolic heart failure. On 09/11/2024, CXR revealed small bilateral pleural effusions, slightly increased in size, CT chest showed moderate bilateral pleural effusion " with adjacent compressive atelectasis, and BNP was elevated at 1098. Echocardiogram performed 10/28/2024 demonstrated an EF of 56%, restrictive pattern of left ventricular diastolic filling, normal RV systolic function, a large pleural effusion, moderate mitral regurgitation, moderate aortic regurgitation with peak and mean gradients of 80.2 mmHg and 41.7 mmHg respectively, severely elevated pulmonary artery pressure, moderately sized atrial septal defect and moderate dilatation of the aortic root. Repeat CXR performed 11/12/2024 revealed mild interstitial congestion with effusions and basilar atelectasis. The patient states that she is here today for a second opinion as her current providers have gone back and forth on whether her symptoms are pulmonary or cardiac in nature. She states that she has been admitted to hospital on four separate occasions over the past several months, noting that prior to 1 admission she had had a syncopal episode. She reports exertional SOB that seems to have slightly improved from prior. She reports a persistent chronic cough.     Past Surgical History:  She has a past surgical history that includes Bladder surgery (02/01/2018); Colonoscopy (03/23/2018); Cholecystectomy (02/01/2018); Total vaginal hysterectomy (03/23/2018); Tonsillectomy (03/23/2018); and Other surgical history (05/09/2018).      Social History:  She reports that she has never smoked. She has never used smokeless tobacco. She reports that she does not currently use alcohol. She reports that she does not use drugs.    Family History:  Family History   Problem Relation Name Age of Onset   • Hypertension Mother     • Stroke Mother     • No Known Problems Father          Allergies:  Lovastatin and Montelukast    Outpatient Medications:  Current Outpatient Medications   Medication Instructions   • acetaminophen (TYLENOL) 650 mg, Every 6 hours PRN   • albuterol 90 mcg/actuation inhaler 2 puffs, inhalation, Every 6 hours PRN  "  • albuterol 1.25 mg, nebulization, Every 4 hours PRN, Enough per box for 30 days if using 120 packets   • aspirin 81 mg, oral, Daily   • atorvastatin (Lipitor) 20 mg tablet TAKE ONE TABLET BY MOUTH EVERY DAY   • cholecalciferol (Vitamin D-3) 125 MCG (5000 UT) capsule 1 capsule, Daily   • clobetasol (Temovate) 0.05 % cream Apply to affected areas of thickened skin on the arms twice daily   • empagliflozin (JARDIANCE) 10 mg, oral, Daily   • famotidine (Pepcid) 20 mg tablet Take by mouth.   • FLUoxetine (PROzac) 20 mg capsule TAKE ONE CAPSULE BY MOUTH EVERY DAY   • fluticasone (Flonase Allergy Relief) 50 mcg/actuation nasal spray 1 spray, Each Nostril, Daily, Shake gently. Before first use, prime pump. After use, clean tip and replace cap.   • nebulizers misc 1 Device, miscellaneous, See admin instructions, Compressor and nebulizer kit (tubing and mouth piece)        Last Recorded Vitals:  Vitals:    11/18/24 1320   BP: 128/56   BP Location: Left arm   Pulse: 88   Weight: 61.2 kg (135 lb)   Height: 1.626 m (5' 4\")       Review of Systems   Cardiovascular:  Positive for dyspnea on exertion.   Respiratory:  Positive for cough.    All other systems reviewed and are negative.     Physical Exam:  Constitutional:       Appearance: Healthy appearance. Not in distress.   Neck:      Vascular: No JVR. JVD normal.   Pulmonary:      Comments: Bibasilar crackles and decreased breath sounds to lung bases.   Chest:      Chest wall: Not tender to palpatation.   Cardiovascular:      PMI at left midclavicular line. Normal rate. Regular rhythm. Normal S1. Normal S2.       Murmurs: There is a grade 3/6 systolic murmur at the URSB.      No gallop.  No click. No rub.   Pulses:     Intact distal pulses.   Edema:     Peripheral edema absent.   Abdominal:      General: Bowel sounds are normal.      Palpations: Abdomen is soft.      Tenderness: There is no abdominal tenderness.   Musculoskeletal: Normal range of motion.         General: No " tenderness. Skin:     General: Skin is warm and dry.   Neurological:      General: No focal deficit present.      Mental Status: Alert and oriented to person, place and time.            Last Labs:  CBC -  Lab Results   Component Value Date    WBC 10.0 10/10/2024    HGB 11.6 (L) 10/10/2024    HCT 36.1 10/10/2024    MCV 90 10/10/2024     10/10/2024       CMP -  Lab Results   Component Value Date    CALCIUM 9.2 10/10/2024    PHOS 4.6 03/17/2018    PROT 6.8 07/29/2024    ALBUMIN 4.2 07/29/2024    AST 18 07/29/2024    ALT 16 07/29/2024    ALKPHOS 82 07/29/2024    BILITOT 0.8 07/29/2024       LIPID PANEL -   Lab Results   Component Value Date    CHOL 130 02/08/2024    TRIG 77 02/08/2024    HDL 51.9 02/08/2024    CHHDL 2.5 02/08/2024    LDLF 72 07/15/2023    VLDL 15 02/08/2024    NHDL 78 02/08/2024       RENAL FUNCTION PANEL -   Lab Results   Component Value Date    GLUCOSE 91 10/10/2024     10/10/2024    K 4.6 10/10/2024     10/10/2024    CO2 27 10/10/2024    ANIONGAP 14 10/10/2024    BUN 19 10/10/2024    CREATININE 1.16 (H) 10/10/2024    CALCIUM 9.2 10/10/2024    PHOS 4.6 03/17/2018    ALBUMIN 4.2 07/29/2024        Lab Results   Component Value Date    BNP 1,151 (H) 10/10/2024       Last Cardiology Tests:  11/12/2024 - CXR  Mild interstitial congestion with effusions and basilar atelectasis.    10/28/2024 - TTE  1. The left ventricular systolic function is normal, with a Lee's biplane calculated ejection fraction of 56%.  2. Spectral Doppler shows a restrictive pattern of left ventricular diastolic filling.  3. There is normal right ventricular global systolic function.  4. The left atrium is moderately dilated.  5. There is a large pleural effusion.  6. The mitral valve is moderately thickened.  7. There is moderate mitral annular calcification.  8. Moderate mitral valve regurgitation.  9. There is a Medtronic transcatheter aortic valve replacement, with a 26mm reported size.  10. Moderate aortic  valve regurgitation.  11. Severely elevated pulmonary artery pressure.  12. There is a moderately sized atrial septal defect.  13. There is moderate dilatation of the aortic root.    09/11/2024 - CTA Chest for PE  1.  Mildly limited study due to motion artifact. No obvious pulmonary emboli.   2. Moderate bilateral pleural effusion with adjacent compressive atelectasis. Additional scattered small regions of atelectasis.   3.  Scattered small and slightly enlarged mediastinal nodes.     09/11/2024 - CXR  Increasing pleural effusions.     04/19/2023 - TTE  1. Left ventricular systolic function is normal with a 65% estimated ejection fraction.  2. Spectral Doppler shows a pseudonormal pattern of left ventricular diastolic filling.  3. RVSP within normal limits.  4. Aortic valve appears abnormal.  5. There is a transcatheter aortic valve replacement.  6. Left ventricular cavity size is decreased.  7. AV gradient and estimated aortic valve area has changed significantly since prior exam.  8. Today's exam: mean gradient of 33 mmHg, peak gradient 50 mmHg, DI .31, JONNY .9 cm2.  9. Prior exam 4/6/22: Mean gradient 9.3 mm Hg, peak gradient 17 mmHg, DI .49, There is now a small pedunculated echo density seen in LVOT, has been seen previously, suspect relates to valve degeneration, has not changed in appearance from prior exam.    04/06/2022 - TTE  1. The left ventricular systolic function is normal with a 60-65% estimated ejection fraction.  2. Spectral Doppler shows a pseudonormal pattern of left ventricular diastolic filling.  3. RVSP within normal limits.  4. There is a transcatheter aortic valve replacement.  5. The left ventricular cavity size is decreased.    03/17/2021 - TTE  1. The left ventricular systolic function is normal with a 55-60% estimated ejection fraction.  2. LV false tendon present.  3. Spectral Doppler shows an abnormal pattern of left ventricular diastolic filling.  4. RVSP within normal limits.  5. S/p  #26 Medtronic Evolut pro plus TAVR with gradients of 16.4/7.1mmHg with no AI.  6. There is a transcatheter aortic valve replacement.  7. Compared with the prior exam from 6/10/2020 there are no sginificant changes. Prior TAVR gradients were 14.6/7mmHg and the RVSP was previously 33mmHg.    06/10/2020 - TTE  1. The left ventricular systolic function is normal with a 60-65% estimated ejection fraction.  2. Spectral Doppler shows an impaired relaxation pattern of left ventricular diastolic filling.  3. Slightly elevated RVSP.  4. S/p #26 Medtronic Evolut pro-plus TAVR with gradients of 14.6/7mmHg and no AI.  5. There is a transcatheter aortic valve replacement.  6. Compared with the prior exam from 9/16/2019 there are no significant changes. Prior TAVR gradients were 13.1/8.3mmHg with no AI.    09/16/2019 - TTE  1. The left ventricular systolic function is normal with a 60-65% estimated ejection fraction.  2. LV false tendon present.  3. Spectral Doppler shows an impaired relaxation pattern of left ventricular diastolic filling.  4. The left atrium is moderately dilated.  5. There is a flow in the region of the atrial septum which appears to be a small amount of left to right flow suggestive of a PFO, not seen on the exam from 4/10/2019. Recommend limited echo for agitated saline contrast to further assess for possilbe PFO /intracardiac shunt.  6. RVSP within normal limits.  7. There is a transcatheter aortic valve replacement.  8. Compared with the exam from 4/10/2019 there are no significant changes, exept that the possible PFO was not seen on the prior study. Prior TAVR gradients were 17/8mmHg. Prior RVSP was normal at 25.7mmHg.     04/10/2019 - TTE  1. The left ventricular systolic function is normal with a 60-65% estimated ejection fraction.  2. Spectral Doppler shows an abnormal pattern of left ventricular diastolic filling.  3. RVSP within normal limits.  4. S/p #26 Medtronic Evolut TAVR with gradients of  17/8mmHg and no AI.  5. There is a transcatheter aortic valve replacement.  6. Compared with the prior exam from 9/25/2018 there are no significant changes. Prior TAVR gradients were 11/6mmHg. Today's gradients are slightly higher.    09/25/2018 - TTE  1. The left ventricular systolic function is normal with a 60-65% estimated ejection fraction.  2. Spectral Doppler shows an impaired relaxation pattern of left ventricular diastolic filling.  3. There is a bioprosthetic aortic valve.    04/24/2018 - TTE  1. The left ventricular systolic function is normal with a 65% estimated ejection fraction.  2. Spectral Doppler shows an impaired relaxation pattern of left ventricular diastolic filling.  3. The left atrium is moderately dilated.  4. There is a bioprosthetic aortic valve.    03/17/2018 - TTE  1. The left ventricular systolic function is normal with a 65% estimated ejection fraction.  2. Abnormal septal motion consistent with left bundle branch block.  3. Spectral Doppler shows an impaired relaxation pattern of left ventricular diastolic filling.  4. There is a transcatheter aortic valve replacement.    03/16/2018 - TTE  1. The left ventricular systolic function is normal.  2. Severe aortic valve stenosis.  3. There is severe aortic valve thickening.  4. There is mild aortic valve regurgitation.    03/16/2018 - TAVR  1. Transcatheter aortic valve replacement with successful implantation of EvoluteR Pro 26 mm valve.  2. Patient was enrolled in a research study and data was included in the TVT Registry.    02/21/2018 - TTE  1. The left ventricular systolic function is normal with a 60-65% estimated ejection fraction.  2. LV false tendon present.  3. Spectral Doppler shows an impaired relaxation pattern of left ventricular diastolic filling.  4. RVSP within normal limits.  5. Sever calcific AS with pk/mn gradients of 103/56mmHg with moderate AI ( DI of 0.19).  6. There is moderate aortic valve cusp calcification.  7.  "There is moderate aortic valve regurgitation.  8. Compared with the prior echo from 7/11/2017 the AV gradients were higher today ( previously measured 68/40mmHg. Still with severe AS.    01/22/2018 - Cardiac Catheterization   1. No significant coronary artery disease with a 50% stenosis in the left circumflex artery.  2. Normal filling pressures and cardiac output.    11/21/2017 - Stress Test  1. No clinical evidence for ischemia at a maximal infusion.  2. Nuclear image results are reported separately.  3. Pt was in LBBB for majority of test rendering ECG uninterpretable.    07/11/2017 - TTE  1. The left ventricular systolic function is normal.  2. Spectral Doppler shows an impaired relaxation pattern of left ventricular diastolic filling.  3. RVSP within normal limits.  4. JONNY is 0.68 cm2.  5. Severe aortic valve stenosis.  6. There is diffuse moderate to severe aortic valve thickening.  7. There is moderate aortic valve cusp calcification.  8. There is mild aortic valve regurgitation.    Lab review: I have personally reviewed the laboratory result(s).  Diagnostic review: I have personally reviewed the result(s) of the Echocardiogram.    Assessment/Plan  1) Aortic Stenosis s/p TAVR 03/16/2018 - Chronic Diastolic Heart Failure  On ASA 81 mg daily, atorvastatin 20 mg daily  Hospital admission [facility] early September 2024 with COVID pneumonia - diagnostic imaging impression showing \"small bilateral pleural effusions with bibasilar atelectasis or pneumonia.\"   Hospital admission late September 2024 with acute on chronic diastolic heart failure  CXR 09/11/2024 with small bilateral pleural effusions, slightly increased in size  CT chest 09/11/2024 with moderate bilateral pleural effusion with adjacent compressive atelectasis  BNP 09/11/2024 elevated at 1098  TTE 10/28/2024 with LVEF 56%, restrictive pattern of left ventricular diastolic filling, normal RV systolic function, a large pleural effusion, moderate " mitral regurgitation, moderate aortic regurgitation with peak and mean gradients of 80.2 mmHg and 41.7 mmHg respectively, severely elevated pulmonary artery pressure, moderately sized atrial septal defect, moderate dilatation of the aortic root.   Repeat CXR 11/12/2024 with mild interstitial congestion with effusions and basilar atelectasis.   Patient reports 4 hospital admissions over past several months  Prior to 1 hospital admission, patient had a syncopal event   Reports exertional SOB, slightly improved from prior  Reports persistent chronic cough  Bibasilar crackles and decreased breath sounds to lung bases heard on exam  Grade 3/6 systolic murmur RUSB heard on exam  Recommend hospital admission for IV diuresis, SEN and Heart Cath in preparation for intervention on severe prosthetic aortic valve stenosis.  Patient wishes to be admitted to hospital at [facility]  Message sent to Dr. Preciado to assist in arrangements for hospital admission     2) Mitral Regurgitation - Aortic Root Dilatation - PFO  TTE 10/28/2024 with moderate mitral regurgitation, moderately sized atrial septal defect, moderate dilatation of the aortic root.         Scribe Attestation  By signing my name below, I, Ryan Gray   attest that this documentation has been prepared under the direction and in the presence of Cl Greenfield MD.

## 2024-11-18 NOTE — PROGRESS NOTES
"Counseling:  The patient was counseled regarding diagnostic results, instructions for management, risk factor reductions, prognosis, patient and family education, impressions, risks and benefits of treatment options and importance of compliance with treatment.      Chief Complaint:  The patient presents today for cardiovascular evaluation of persistent SOB in a patient with h/o aortic stenosis s/p TAVR and diastolic heart failure.     History Of Present Illness:    Aure Hdez is a 84 y.o. female patient whose PMH is significant for CAD, LBBB, aortic stenosis s/p TAVR 03/16/2018, HTN, diastolic heart failure, mitral regurgitation, GERD, depression, SDH, and syncope. She presents today in the company of her  to establish cardiovascular care for a second opinion of persistent SOB. The patient underwent TAVR on 03/16/2018 and has been followed by Dr. Preciado and Sweetie Dubon, APRN-CNP. She was admitted to [facility] in early September with COVID pneumonia, with diagnostic imaging impression showing \"small bilateral pleural effusions with bibasilar atelectasis or pneumonia.\" The patient was readmitted to hospital later that month with acute on chronic diastolic heart failure. On 09/11/2024, CXR revealed small bilateral pleural effusions, slightly increased in size, CT chest showed moderate bilateral pleural effusion with adjacent compressive atelectasis, and BNP was elevated at 1098. Echocardiogram performed 10/28/2024 demonstrated an EF of 56%, restrictive pattern of left ventricular diastolic filling, normal RV systolic function, a large pleural effusion, moderate mitral regurgitation, moderate aortic regurgitation with peak and mean gradients of 80.2 mmHg and 41.7 mmHg respectively, severely elevated pulmonary artery pressure, moderately sized atrial septal defect and moderate dilatation of the aortic root. Repeat CXR performed 11/12/2024 revealed mild interstitial congestion with effusions and basilar " atelectasis. The patient states that she is here today for a second opinion as her current providers have gone back and forth on whether her symptoms are pulmonary or cardiac in nature. She states that she has been admitted to hospital on four separate occasions over the past several months, noting that prior to 1 admission she had had a syncopal episode. She reports exertional SOB that seems to have slightly improved from prior. She reports a persistent chronic cough.     Past Surgical History:  She has a past surgical history that includes Bladder surgery (02/01/2018); Colonoscopy (03/23/2018); Cholecystectomy (02/01/2018); Total vaginal hysterectomy (03/23/2018); Tonsillectomy (03/23/2018); and Other surgical history (05/09/2018).      Social History:  She reports that she has never smoked. She has never used smokeless tobacco. She reports that she does not currently use alcohol. She reports that she does not use drugs.    Family History:  Family History   Problem Relation Name Age of Onset    Hypertension Mother      Stroke Mother      No Known Problems Father          Allergies:  Lovastatin and Montelukast    Outpatient Medications:  Current Outpatient Medications   Medication Instructions    acetaminophen (TYLENOL) 650 mg, Every 6 hours PRN    albuterol 90 mcg/actuation inhaler 2 puffs, inhalation, Every 6 hours PRN    albuterol 1.25 mg, nebulization, Every 4 hours PRN, Enough per box for 30 days if using 120 packets    aspirin 81 mg, oral, Daily    atorvastatin (Lipitor) 20 mg tablet TAKE ONE TABLET BY MOUTH EVERY DAY    cholecalciferol (Vitamin D-3) 125 MCG (5000 UT) capsule 1 capsule, Daily    clobetasol (Temovate) 0.05 % cream Apply to affected areas of thickened skin on the arms twice daily    empagliflozin (JARDIANCE) 10 mg, oral, Daily    famotidine (Pepcid) 20 mg tablet Take by mouth.    FLUoxetine (PROzac) 20 mg capsule TAKE ONE CAPSULE BY MOUTH EVERY DAY    fluticasone (Flonase Allergy Relief) 50  "mcg/actuation nasal spray 1 spray, Each Nostril, Daily, Shake gently. Before first use, prime pump. After use, clean tip and replace cap.    nebulizers misc 1 Device, miscellaneous, See admin instructions, Compressor and nebulizer kit (tubing and mouth piece)        Last Recorded Vitals:  Vitals:    11/18/24 1320   BP: 128/56   BP Location: Left arm   Pulse: 88   Weight: 61.2 kg (135 lb)   Height: 1.626 m (5' 4\")       Review of Systems   Cardiovascular:  Positive for dyspnea on exertion.   Respiratory:  Positive for cough.    All other systems reviewed and are negative.     Physical Exam:  Constitutional:       Appearance: Healthy appearance. Not in distress.   Neck:      Vascular: No JVR. JVD normal.   Pulmonary:      Comments: Bibasilar crackles and decreased breath sounds to lung bases.   Chest:      Chest wall: Not tender to palpatation.   Cardiovascular:      PMI at left midclavicular line. Normal rate. Regular rhythm. Normal S1. Normal S2.       Murmurs: There is a grade 3/6 systolic murmur at the URSB.      No gallop.  No click. No rub.   Pulses:     Intact distal pulses.   Edema:     Peripheral edema absent.   Abdominal:      General: Bowel sounds are normal.      Palpations: Abdomen is soft.      Tenderness: There is no abdominal tenderness.   Musculoskeletal: Normal range of motion.         General: No tenderness. Skin:     General: Skin is warm and dry.   Neurological:      General: No focal deficit present.      Mental Status: Alert and oriented to person, place and time.            Last Labs:  CBC -  Lab Results   Component Value Date    WBC 10.0 10/10/2024    HGB 11.6 (L) 10/10/2024    HCT 36.1 10/10/2024    MCV 90 10/10/2024     10/10/2024       CMP -  Lab Results   Component Value Date    CALCIUM 9.2 10/10/2024    PHOS 4.6 03/17/2018    PROT 6.8 07/29/2024    ALBUMIN 4.2 07/29/2024    AST 18 07/29/2024    ALT 16 07/29/2024    ALKPHOS 82 07/29/2024    BILITOT 0.8 07/29/2024       LIPID PANEL - "   Lab Results   Component Value Date    CHOL 130 02/08/2024    TRIG 77 02/08/2024    HDL 51.9 02/08/2024    CHHDL 2.5 02/08/2024    LDLF 72 07/15/2023    VLDL 15 02/08/2024    NHDL 78 02/08/2024       RENAL FUNCTION PANEL -   Lab Results   Component Value Date    GLUCOSE 91 10/10/2024     10/10/2024    K 4.6 10/10/2024     10/10/2024    CO2 27 10/10/2024    ANIONGAP 14 10/10/2024    BUN 19 10/10/2024    CREATININE 1.16 (H) 10/10/2024    CALCIUM 9.2 10/10/2024    PHOS 4.6 03/17/2018    ALBUMIN 4.2 07/29/2024        Lab Results   Component Value Date    BNP 1,151 (H) 10/10/2024       Last Cardiology Tests:  11/12/2024 - CXR  Mild interstitial congestion with effusions and basilar atelectasis.    10/28/2024 - TTE  1. The left ventricular systolic function is normal, with a Lee's biplane calculated ejection fraction of 56%.  2. Spectral Doppler shows a restrictive pattern of left ventricular diastolic filling.  3. There is normal right ventricular global systolic function.  4. The left atrium is moderately dilated.  5. There is a large pleural effusion.  6. The mitral valve is moderately thickened.  7. There is moderate mitral annular calcification.  8. Moderate mitral valve regurgitation.  9. There is a Medtronic transcatheter aortic valve replacement, with a 26mm reported size.  10. Moderate aortic valve regurgitation.  11. Severely elevated pulmonary artery pressure.  12. There is a moderately sized atrial septal defect.  13. There is moderate dilatation of the aortic root.    09/11/2024 - CTA Chest for PE  1.  Mildly limited study due to motion artifact. No obvious pulmonary emboli.   2. Moderate bilateral pleural effusion with adjacent compressive atelectasis. Additional scattered small regions of atelectasis.   3.  Scattered small and slightly enlarged mediastinal nodes.     09/11/2024 - CXR  Increasing pleural effusions.     04/19/2023 - TTE  1. Left ventricular systolic function is normal with a  65% estimated ejection fraction.  2. Spectral Doppler shows a pseudonormal pattern of left ventricular diastolic filling.  3. RVSP within normal limits.  4. Aortic valve appears abnormal.  5. There is a transcatheter aortic valve replacement.  6. Left ventricular cavity size is decreased.  7. AV gradient and estimated aortic valve area has changed significantly since prior exam.  8. Today's exam: mean gradient of 33 mmHg, peak gradient 50 mmHg, DI .31, JONNY .9 cm2.  9. Prior exam 4/6/22: Mean gradient 9.3 mm Hg, peak gradient 17 mmHg, DI .49, There is now a small pedunculated echo density seen in LVOT, has been seen previously, suspect relates to valve degeneration, has not changed in appearance from prior exam.    04/06/2022 - TTE  1. The left ventricular systolic function is normal with a 60-65% estimated ejection fraction.  2. Spectral Doppler shows a pseudonormal pattern of left ventricular diastolic filling.  3. RVSP within normal limits.  4. There is a transcatheter aortic valve replacement.  5. The left ventricular cavity size is decreased.    03/17/2021 - TTE  1. The left ventricular systolic function is normal with a 55-60% estimated ejection fraction.  2. LV false tendon present.  3. Spectral Doppler shows an abnormal pattern of left ventricular diastolic filling.  4. RVSP within normal limits.  5. S/p #26 Medtronic Evolut pro plus TAVR with gradients of 16.4/7.1mmHg with no AI.  6. There is a transcatheter aortic valve replacement.  7. Compared with the prior exam from 6/10/2020 there are no sginificant changes. Prior TAVR gradients were 14.6/7mmHg and the RVSP was previously 33mmHg.    06/10/2020 - TTE  1. The left ventricular systolic function is normal with a 60-65% estimated ejection fraction.  2. Spectral Doppler shows an impaired relaxation pattern of left ventricular diastolic filling.  3. Slightly elevated RVSP.  4. S/p #26 Medtronic Evolut pro-plus TAVR with gradients of 14.6/7mmHg and no AI.  5.  There is a transcatheter aortic valve replacement.  6. Compared with the prior exam from 9/16/2019 there are no significant changes. Prior TAVR gradients were 13.1/8.3mmHg with no AI.    09/16/2019 - TTE  1. The left ventricular systolic function is normal with a 60-65% estimated ejection fraction.  2. LV false tendon present.  3. Spectral Doppler shows an impaired relaxation pattern of left ventricular diastolic filling.  4. The left atrium is moderately dilated.  5. There is a flow in the region of the atrial septum which appears to be a small amount of left to right flow suggestive of a PFO, not seen on the exam from 4/10/2019. Recommend limited echo for agitated saline contrast to further assess for possilbe PFO /intracardiac shunt.  6. RVSP within normal limits.  7. There is a transcatheter aortic valve replacement.  8. Compared with the exam from 4/10/2019 there are no significant changes, exept that the possible PFO was not seen on the prior study. Prior TAVR gradients were 17/8mmHg. Prior RVSP was normal at 25.7mmHg.     04/10/2019 - TTE  1. The left ventricular systolic function is normal with a 60-65% estimated ejection fraction.  2. Spectral Doppler shows an abnormal pattern of left ventricular diastolic filling.  3. RVSP within normal limits.  4. S/p #26 Medtronic Evolut TAVR with gradients of 17/8mmHg and no AI.  5. There is a transcatheter aortic valve replacement.  6. Compared with the prior exam from 9/25/2018 there are no significant changes. Prior TAVR gradients were 11/6mmHg. Today's gradients are slightly higher.    09/25/2018 - TTE  1. The left ventricular systolic function is normal with a 60-65% estimated ejection fraction.  2. Spectral Doppler shows an impaired relaxation pattern of left ventricular diastolic filling.  3. There is a bioprosthetic aortic valve.    04/24/2018 - TTE  1. The left ventricular systolic function is normal with a 65% estimated ejection fraction.  2. Spectral Doppler  shows an impaired relaxation pattern of left ventricular diastolic filling.  3. The left atrium is moderately dilated.  4. There is a bioprosthetic aortic valve.    03/17/2018 - TTE  1. The left ventricular systolic function is normal with a 65% estimated ejection fraction.  2. Abnormal septal motion consistent with left bundle branch block.  3. Spectral Doppler shows an impaired relaxation pattern of left ventricular diastolic filling.  4. There is a transcatheter aortic valve replacement.    03/16/2018 - TTE  1. The left ventricular systolic function is normal.  2. Severe aortic valve stenosis.  3. There is severe aortic valve thickening.  4. There is mild aortic valve regurgitation.    03/16/2018 - TAVR  1. Transcatheter aortic valve replacement with successful implantation of EvoluteR Pro 26 mm valve.  2. Patient was enrolled in a research study and data was included in the TVT Registry.    02/21/2018 - TTE  1. The left ventricular systolic function is normal with a 60-65% estimated ejection fraction.  2. LV false tendon present.  3. Spectral Doppler shows an impaired relaxation pattern of left ventricular diastolic filling.  4. RVSP within normal limits.  5. Sever calcific AS with pk/mn gradients of 103/56mmHg with moderate AI ( DI of 0.19).  6. There is moderate aortic valve cusp calcification.  7. There is moderate aortic valve regurgitation.  8. Compared with the prior echo from 7/11/2017 the AV gradients were higher today ( previously measured 68/40mmHg. Still with severe AS.    01/22/2018 - Cardiac Catheterization   1. No significant coronary artery disease with a 50% stenosis in the left circumflex artery.  2. Normal filling pressures and cardiac output.    11/21/2017 - Stress Test  1. No clinical evidence for ischemia at a maximal infusion.  2. Nuclear image results are reported separately.  3. Pt was in LBBB for majority of test rendering ECG uninterpretable.    07/11/2017 - TTE  1. The left ventricular  "systolic function is normal.  2. Spectral Doppler shows an impaired relaxation pattern of left ventricular diastolic filling.  3. RVSP within normal limits.  4. JONNY is 0.68 cm2.  5. Severe aortic valve stenosis.  6. There is diffuse moderate to severe aortic valve thickening.  7. There is moderate aortic valve cusp calcification.  8. There is mild aortic valve regurgitation.    Lab review: I have personally reviewed the laboratory result(s).  Diagnostic review: I have personally reviewed the result(s) of the Echocardiogram.    Assessment/Plan   1) Aortic Stenosis s/p TAVR 03/16/2018 - Chronic Diastolic Heart Failure  On ASA 81 mg daily, atorvastatin 20 mg daily  Hospital admission [facility] early September 2024 with COVID pneumonia - diagnostic imaging impression showing \"small bilateral pleural effusions with bibasilar atelectasis or pneumonia.\"   Hospital admission late September 2024 with acute on chronic diastolic heart failure  CXR 09/11/2024 with small bilateral pleural effusions, slightly increased in size  CT chest 09/11/2024 with moderate bilateral pleural effusion with adjacent compressive atelectasis  BNP 09/11/2024 elevated at 1098  TTE 10/28/2024 with LVEF 56%, restrictive pattern of left ventricular diastolic filling, normal RV systolic function, a large pleural effusion, moderate mitral regurgitation, moderate aortic regurgitation with peak and mean gradients of 80.2 mmHg and 41.7 mmHg respectively, severely elevated pulmonary artery pressure, moderately sized atrial septal defect, moderate dilatation of the aortic root.   Repeat CXR 11/12/2024 with mild interstitial congestion with effusions and basilar atelectasis.   Patient reports 4 hospital admissions over past several months  Prior to 1 hospital admission, patient had a syncopal event   Reports exertional SOB, slightly improved from prior  Reports persistent chronic cough  Bibasilar crackles and decreased breath sounds to lung bases heard " on exam  Grade 3/6 systolic murmur RUSB heard on exam  Recommend hospital admission for IV diuresis, SEN and Heart Cath in preparation for intervention on severe prosthetic aortic valve stenosis.  Patient wishes to be admitted to hospital at [facility]  Message sent to Dr. Preciado to assist in arrangements for hospital admission     2) Mitral Regurgitation - Aortic Root Dilatation - PFO  TTE 10/28/2024 with moderate mitral regurgitation, moderately sized atrial septal defect, moderate dilatation of the aortic root.         Scribe Attestation  By signing my name below, I, Ryan Gray   attest that this documentation has been prepared under the direction and in the presence of Cl Greenfield MD.

## 2024-11-18 NOTE — TELEPHONE ENCOUNTER
----- Message from Sweetie Dubon sent at 11/14/2024  2:02 PM EST -----  [facility] wants SEN. Order placed.   Due to HF start jardiance labs 1-2 weeks after starting. BMP order placed.   Ask her to check some bps at home to see if I have bp room to start entresto

## 2024-11-19 ENCOUNTER — TELEPHONE (OUTPATIENT)
Dept: CARDIOLOGY | Facility: HOSPITAL | Age: 84
End: 2024-11-19
Payer: MEDICARE

## 2024-11-19 ENCOUNTER — TELEPHONE (OUTPATIENT)
Dept: PRIMARY CARE | Facility: CLINIC | Age: 84
End: 2024-11-19
Payer: MEDICARE

## 2024-11-19 ENCOUNTER — APPOINTMENT (OUTPATIENT)
Dept: RADIOLOGY | Facility: HOSPITAL | Age: 84
DRG: 287 | End: 2024-11-19
Payer: MEDICARE

## 2024-11-19 ENCOUNTER — DOCUMENTATION (OUTPATIENT)
Dept: CARDIOLOGY | Facility: HOSPITAL | Age: 84
End: 2024-11-19
Payer: MEDICARE

## 2024-11-19 ENCOUNTER — CLINICAL SUPPORT (OUTPATIENT)
Dept: EMERGENCY MEDICINE | Facility: HOSPITAL | Age: 84
DRG: 287 | End: 2024-11-19
Payer: MEDICARE

## 2024-11-19 ENCOUNTER — DOCUMENTATION (OUTPATIENT)
Dept: CARDIOLOGY | Facility: HOSPITAL | Age: 84
End: 2024-11-19

## 2024-11-19 ENCOUNTER — HOSPITAL ENCOUNTER (INPATIENT)
Facility: HOSPITAL | Age: 84
LOS: 4 days | Discharge: HOME | DRG: 287 | End: 2024-11-23
Attending: EMERGENCY MEDICINE | Admitting: INTERNAL MEDICINE
Payer: MEDICARE

## 2024-11-19 DIAGNOSIS — I35.0 AORTIC VALVE STENOSIS: ICD-10-CM

## 2024-11-19 DIAGNOSIS — I35.1 AORTIC VALVE REGURGITATION: ICD-10-CM

## 2024-11-19 DIAGNOSIS — I50.33 ACUTE ON CHRONIC DIASTOLIC CONGESTIVE HEART FAILURE: ICD-10-CM

## 2024-11-19 DIAGNOSIS — I35.0 AORTIC VALVE STENOSIS, ETIOLOGY OF CARDIAC VALVE DISEASE UNSPECIFIED: Primary | ICD-10-CM

## 2024-11-19 LAB
ALBUMIN SERPL BCP-MCNC: 4.3 G/DL (ref 3.4–5)
ALP SERPL-CCNC: 54 U/L (ref 33–136)
ALT SERPL W P-5'-P-CCNC: 13 U/L (ref 7–45)
ANION GAP SERPL CALC-SCNC: 12 MMOL/L (ref 10–20)
AST SERPL W P-5'-P-CCNC: 30 U/L (ref 9–39)
BASOPHILS # BLD AUTO: 0.04 X10*3/UL (ref 0–0.1)
BASOPHILS NFR BLD AUTO: 0.6 %
BILIRUB SERPL-MCNC: 0.8 MG/DL (ref 0–1.2)
BNP SERPL-MCNC: 596 PG/ML (ref 0–99)
BUN SERPL-MCNC: 14 MG/DL (ref 6–23)
CALCIUM SERPL-MCNC: 9.1 MG/DL (ref 8.6–10.6)
CARDIAC TROPONIN I PNL SERPL HS: 164 NG/L (ref 0–34)
CARDIAC TROPONIN I PNL SERPL HS: 188 NG/L (ref 0–34)
CHLORIDE SERPL-SCNC: 103 MMOL/L (ref 98–107)
CO2 SERPL-SCNC: 25 MMOL/L (ref 21–32)
CREAT SERPL-MCNC: 0.88 MG/DL (ref 0.5–1.05)
EGFRCR SERPLBLD CKD-EPI 2021: 64 ML/MIN/1.73M*2
EOSINOPHIL # BLD AUTO: 0.55 X10*3/UL (ref 0–0.4)
EOSINOPHIL NFR BLD AUTO: 8.4 %
ERYTHROCYTE [DISTWIDTH] IN BLOOD BY AUTOMATED COUNT: 13.5 % (ref 11.5–14.5)
GLUCOSE SERPL-MCNC: 77 MG/DL (ref 74–99)
HCT VFR BLD AUTO: 33.5 % (ref 36–46)
HGB BLD-MCNC: 11.3 G/DL (ref 12–16)
IMM GRANULOCYTES # BLD AUTO: 0.02 X10*3/UL (ref 0–0.5)
IMM GRANULOCYTES NFR BLD AUTO: 0.3 % (ref 0–0.9)
LYMPHOCYTES # BLD AUTO: 1.22 X10*3/UL (ref 0.8–3)
LYMPHOCYTES NFR BLD AUTO: 18.5 %
MCH RBC QN AUTO: 29 PG (ref 26–34)
MCHC RBC AUTO-ENTMCNC: 33.7 G/DL (ref 32–36)
MCV RBC AUTO: 86 FL (ref 80–100)
MONOCYTES # BLD AUTO: 0.42 X10*3/UL (ref 0.05–0.8)
MONOCYTES NFR BLD AUTO: 6.4 %
NEUTROPHILS # BLD AUTO: 4.33 X10*3/UL (ref 1.6–5.5)
NEUTROPHILS NFR BLD AUTO: 65.8 %
NRBC BLD-RTO: 0 /100 WBCS (ref 0–0)
PLATELET # BLD AUTO: 184 X10*3/UL (ref 150–450)
POTASSIUM SERPL-SCNC: 4.1 MMOL/L (ref 3.5–5.3)
PROT SERPL-MCNC: 7.2 G/DL (ref 6.4–8.2)
RBC # BLD AUTO: 3.89 X10*6/UL (ref 4–5.2)
SODIUM SERPL-SCNC: 136 MMOL/L (ref 136–145)
WBC # BLD AUTO: 6.6 X10*3/UL (ref 4.4–11.3)

## 2024-11-19 PROCEDURE — 80053 COMPREHEN METABOLIC PANEL: CPT | Performed by: EMERGENCY MEDICINE

## 2024-11-19 PROCEDURE — 83880 ASSAY OF NATRIURETIC PEPTIDE: CPT | Performed by: EMERGENCY MEDICINE

## 2024-11-19 PROCEDURE — 36415 COLL VENOUS BLD VENIPUNCTURE: CPT | Performed by: EMERGENCY MEDICINE

## 2024-11-19 PROCEDURE — 2500000001 HC RX 250 WO HCPCS SELF ADMINISTERED DRUGS (ALT 637 FOR MEDICARE OP): Performed by: NURSE PRACTITIONER

## 2024-11-19 PROCEDURE — 99285 EMERGENCY DEPT VISIT HI MDM: CPT | Mod: 25

## 2024-11-19 PROCEDURE — 71046 X-RAY EXAM CHEST 2 VIEWS: CPT

## 2024-11-19 PROCEDURE — 84484 ASSAY OF TROPONIN QUANT: CPT | Performed by: EMERGENCY MEDICINE

## 2024-11-19 PROCEDURE — 93010 ELECTROCARDIOGRAM REPORT: CPT | Performed by: EMERGENCY MEDICINE

## 2024-11-19 PROCEDURE — 93005 ELECTROCARDIOGRAM TRACING: CPT

## 2024-11-19 PROCEDURE — 87040 BLOOD CULTURE FOR BACTERIA: CPT | Performed by: NURSE PRACTITIONER

## 2024-11-19 PROCEDURE — 85025 COMPLETE CBC W/AUTO DIFF WBC: CPT | Performed by: STUDENT IN AN ORGANIZED HEALTH CARE EDUCATION/TRAINING PROGRAM

## 2024-11-19 PROCEDURE — 36415 COLL VENOUS BLD VENIPUNCTURE: CPT | Performed by: STUDENT IN AN ORGANIZED HEALTH CARE EDUCATION/TRAINING PROGRAM

## 2024-11-19 PROCEDURE — 85025 COMPLETE CBC W/AUTO DIFF WBC: CPT | Performed by: EMERGENCY MEDICINE

## 2024-11-19 PROCEDURE — 1200000002 HC GENERAL ROOM WITH TELEMETRY DAILY

## 2024-11-19 PROCEDURE — 99285 EMERGENCY DEPT VISIT HI MDM: CPT | Performed by: EMERGENCY MEDICINE

## 2024-11-19 PROCEDURE — 83880 ASSAY OF NATRIURETIC PEPTIDE: CPT | Performed by: STUDENT IN AN ORGANIZED HEALTH CARE EDUCATION/TRAINING PROGRAM

## 2024-11-19 PROCEDURE — 80053 COMPREHEN METABOLIC PANEL: CPT | Performed by: STUDENT IN AN ORGANIZED HEALTH CARE EDUCATION/TRAINING PROGRAM

## 2024-11-19 PROCEDURE — 71046 X-RAY EXAM CHEST 2 VIEWS: CPT | Performed by: STUDENT IN AN ORGANIZED HEALTH CARE EDUCATION/TRAINING PROGRAM

## 2024-11-19 PROCEDURE — 84484 ASSAY OF TROPONIN QUANT: CPT | Performed by: STUDENT IN AN ORGANIZED HEALTH CARE EDUCATION/TRAINING PROGRAM

## 2024-11-19 RX ORDER — POLYETHYLENE GLYCOL 3350 17 G/17G
17 POWDER, FOR SOLUTION ORAL DAILY
Status: DISCONTINUED | OUTPATIENT
Start: 2024-11-19 | End: 2024-11-23 | Stop reason: HOSPADM

## 2024-11-19 RX ORDER — FAMOTIDINE 20 MG/1
20 TABLET, FILM COATED ORAL DAILY
Status: DISCONTINUED | OUTPATIENT
Start: 2024-11-19 | End: 2024-11-23 | Stop reason: HOSPADM

## 2024-11-19 RX ORDER — ACETAMINOPHEN 325 MG/1
650 TABLET ORAL EVERY 4 HOURS PRN
Status: DISCONTINUED | OUTPATIENT
Start: 2024-11-19 | End: 2024-11-23 | Stop reason: HOSPADM

## 2024-11-19 RX ORDER — ATORVASTATIN CALCIUM 20 MG/1
20 TABLET, FILM COATED ORAL NIGHTLY
Status: DISCONTINUED | OUTPATIENT
Start: 2024-11-19 | End: 2024-11-23 | Stop reason: HOSPADM

## 2024-11-19 RX ORDER — FLUOXETINE HYDROCHLORIDE 20 MG/1
20 CAPSULE ORAL DAILY
Status: DISCONTINUED | OUTPATIENT
Start: 2024-11-19 | End: 2024-11-23 | Stop reason: HOSPADM

## 2024-11-19 RX ORDER — CHOLECALCIFEROL (VITAMIN D3) 25 MCG
5000 TABLET ORAL DAILY
Status: DISCONTINUED | OUTPATIENT
Start: 2024-11-20 | End: 2024-11-23 | Stop reason: HOSPADM

## 2024-11-19 RX ORDER — NAPROXEN SODIUM 220 MG/1
81 TABLET, FILM COATED ORAL DAILY
Status: DISCONTINUED | OUTPATIENT
Start: 2024-11-19 | End: 2024-11-23 | Stop reason: HOSPADM

## 2024-11-19 RX ORDER — ALBUTEROL SULFATE 0.83 MG/ML
1.25 SOLUTION RESPIRATORY (INHALATION) EVERY 4 HOURS PRN
Status: DISCONTINUED | OUTPATIENT
Start: 2024-11-19 | End: 2024-11-23 | Stop reason: HOSPADM

## 2024-11-19 RX ORDER — FLUTICASONE PROPIONATE 50 MCG
1 SPRAY, SUSPENSION (ML) NASAL DAILY
Status: DISCONTINUED | OUTPATIENT
Start: 2024-11-19 | End: 2024-11-23 | Stop reason: HOSPADM

## 2024-11-19 SDOH — HEALTH STABILITY: MENTAL HEALTH: HAVE YOU ACTUALLY HAD ANY THOUGHTS OF KILLING YOURSELF?: NO

## 2024-11-19 SDOH — HEALTH STABILITY: MENTAL HEALTH: HAVE YOU EVER DONE ANYTHING, STARTED TO DO ANYTHING, OR PREPARED TO DO ANYTHING TO END YOUR LIFE?: NO

## 2024-11-19 SDOH — HEALTH STABILITY: MENTAL HEALTH: SUICIDE ASSESSMENT: ADULT (C-SSRS)

## 2024-11-19 SDOH — HEALTH STABILITY: MENTAL HEALTH: BEHAVIORAL HEALTH(WDL): WITHIN DEFINED LIMITS

## 2024-11-19 SDOH — HEALTH STABILITY: MENTAL HEALTH: HAVE YOU WISHED YOU WERE DEAD OR WISHED YOU COULD GO TO SLEEP AND NOT WAKE UP?: NO

## 2024-11-19 ASSESSMENT — LIFESTYLE VARIABLES
EVER FELT BAD OR GUILTY ABOUT YOUR DRINKING: NO
TOTAL SCORE: 0
EVER HAD A DRINK FIRST THING IN THE MORNING TO STEADY YOUR NERVES TO GET RID OF A HANGOVER: NO
HAVE YOU EVER FELT YOU SHOULD CUT DOWN ON YOUR DRINKING: NO
HAVE PEOPLE ANNOYED YOU BY CRITICIZING YOUR DRINKING: NO

## 2024-11-19 ASSESSMENT — PAIN SCALES - GENERAL
PAINLEVEL_OUTOF10: 0 - NO PAIN

## 2024-11-19 ASSESSMENT — ENCOUNTER SYMPTOMS
BRUISES/BLEEDS EASILY: 0
DIARRHEA: 0
DIFFICULTY URINATING: 0
COLOR CHANGE: 0
DIZZINESS: 0
ACTIVITY CHANGE: 0
SHORTNESS OF BREATH: 1
COUGH: 1
HEADACHES: 0
AGITATION: 0
ABDOMINAL PAIN: 0
CONFUSION: 0
APPETITE CHANGE: 1
CHEST TIGHTNESS: 0
ABDOMINAL DISTENTION: 0
DYSURIA: 0
ARTHRALGIAS: 0
CONSTIPATION: 0
MYALGIAS: 0

## 2024-11-19 ASSESSMENT — COLUMBIA-SUICIDE SEVERITY RATING SCALE - C-SSRS
2. HAVE YOU ACTUALLY HAD ANY THOUGHTS OF KILLING YOURSELF?: NO
6. HAVE YOU EVER DONE ANYTHING, STARTED TO DO ANYTHING, OR PREPARED TO DO ANYTHING TO END YOUR LIFE?: NO
1. IN THE PAST MONTH, HAVE YOU WISHED YOU WERE DEAD OR WISHED YOU COULD GO TO SLEEP AND NOT WAKE UP?: NO

## 2024-11-19 ASSESSMENT — PAIN - FUNCTIONAL ASSESSMENT: PAIN_FUNCTIONAL_ASSESSMENT: 0-10

## 2024-11-19 NOTE — PROGRESS NOTES
Pharmacy Medication History Review    Aure Hdez is a 84 y.o. female admitted for Aortic valve stenosis, etiology of cardiac valve disease unspecified. Pharmacy reviewed the patient's [id number] medications and allergies for accuracy.    Medications ADDED:  None   Medications CHANGED:  None   Medications REMOVED:   Acetaminophen 325 mg      The list below reflects the updated PTA list.   Prior to Admission Medications   Prescriptions Last Dose Informant   FLUoxetine (PROzac) 20 mg capsule 11/19/2024 Self   Sig: TAKE ONE CAPSULE BY MOUTH EVERY DAY   albuterol 1.25 mg/3 mL nebulizer solution 11/19/2024 Self   Sig: Take 3 mL (1.25 mg) by nebulization every 4 hours if needed for wheezing or shortness of breath (cough). Enough per box for 30 days if using 120 packets   Patient taking differently: Take 3 mL (1.25 mg) by nebulization 2 times a day. Enough per box for 30 days if using 120 packets   albuterol 90 mcg/actuation inhaler  Self   Sig: Inhale 2 puffs every 6 hours if needed for wheezing.   aspirin 81 mg chewable tablet 11/19/2024 Morning Self   Sig: Chew 1 tablet (81 mg) once daily.   atorvastatin (Lipitor) 20 mg tablet 11/19/2024 Self   Sig: TAKE ONE TABLET BY MOUTH EVERY DAY   cholecalciferol (Vitamin D-3) 125 MCG (5000 UT) capsule 11/19/2024 Self   Sig: Take 1 capsule (125 mcg) by mouth once daily.   clobetasol (Temovate) 0.05 % cream  Self   Sig: Apply to affected areas of thickened skin on the arms twice daily   empagliflozin (Jardiance) 10 mg Not Taking Self   Sig: Take 1 tablet (10 mg) by mouth once daily.   Patient not taking: Reported on 11/19/2024   famotidine (Pepcid) 20 mg tablet 11/19/2024 Self   Sig: Take 1 tablet (20 mg) by mouth once daily.   fluticasone (Flonase Allergy Relief) 50 mcg/actuation nasal spray Not Taking Self   Sig: Administer 1 spray into each nostril once daily. Shake gently. Before first use, prime pump. After use, clean tip and replace cap.   Patient not taking: Reported  "on 2024   nebulizers misc  Self   Si Device see administration instructions. Compressor and nebulizer kit (tubing and mouth piece)      Facility-Administered Medications: None        The list below reflects the updated allergy list. Please review each documented allergy for additional clarification and justification.  Allergies  Reviewed by Shreyas Hardy [facility] on 2024        Severity Reactions Comments    Lovastatin Not Specified Myalgia     Montelukast Not Specified Unknown Patient cannot remember reaction            Patient declines M2B at discharge.   Local pharmacy: [facility] [place]     Sources:   Patient interview - excellent historian, has medication list with her and is able to answer questions regarding medication administration.   Dispense hx   OARRS     Additional Comments:  Empagliflozin 10 mg PO daily was prescribed for heart failure. Patient has not started taking this medication yet. She reports 2 of her other doctors advised against starting this medication.       Shreyas Hardy, PharmD  Transitions of Care Pharmacist  24 4:58 PM     Secure Chat preferred   If no response call [phone] or Vocera \"Med Rec\"   "

## 2024-11-19 NOTE — PROGRESS NOTES
SUBJECT ID:  [id number]     TAVR LOW RISK ADVERSE EVENT     ADVERSE EVENT TERM  traumatic brain injury     Describe the adverse event including relevant symptoms, clinical findings and underlying cause  presenting to the emergency department for evaluation of  REPORTED syncopal FALL,Patient arrives complaining of a headache, but no blurry vision.    CT BRAIN WO IVCON SHOWED Acute intracranial hemorrhage  . She was admitted to the ICU for neurological monitoring. Neurosurgery was consulted and recommended non-operative treatment.  She would undergo multiple repeat CT scans of her brain which remained stable with regards to her bleed and she remained neurologically intact.  DISCHARGED ON jUNE26, 2024     Date of onset or first observation  23 June 2024     Specify Timeframe:  [] Pre-Implant  [] During Implant  [x] Post-Implant    Date of site's first knowledge of event  19 Nov 2024     Seriousness  [] Not Serious  [x] Serious  If Serious, did the Adverse Event:  [] Lead to Death  [x] Lead to a serious deterioration in the health of the subject, as defined by:   [] A life-threatening illness or injury, or    [] A permanent impairment of a body structure or a body function, including chronic diseases, or    [x] In patient or prolonged existing hospitalization    [x] Medical or surgical intervention to prevent life-threatening illness or injury or permanent impairment to a body structure or body function   [] Lead to fetal distress, fetal death, a congential abnormality or birth defect including physical or mental impairment     Relationships    Relation to the TAVR valve  []Related []Probably Related []Possibly Related []Unlikely Related [x]Not Related []N/A    Relation to the SAVR valve  []Related []Probably Related []Possibly Related []Unlikely Related []Not Related [x]N/A    Relation to the TAVR Delivery Catheter System  []Related []Probably Related []Possibly Related []Unlikely Related [x]Not Related  []N/A    Relation to the TAVR Loading System  []Related []Probably Related []Possibly Related []Unlikely Related [x]Not Related []N/A    Relation to the TAVR implant procedure  []Related []Probably Related []Possibly Related []Unlikely Related [x]Not Related []N/A    Relation to the SAVR implant procedure  []Related []Probably Related []Possibly Related []Unlikely Related []Not Related [x]N/A    Treatment  [] None  [x] Medication, Specify  ZOFRAN, FLUID PAIN KILLERS   [] Implantation of permanent pacemaker device   Date of Implant:      [] Catheter intervention on the TAVR or SAVR valve  Date of Implant:      [] Surgical intervention to repair or replace the TAVR or SAVR  Date of Implant:      [x] Neurological evaluation   [] Surgical intervention other than to repair or replace the TAVR or SAVR, specify procedure     [] Transfusion or blood products used, specify     [] Percutaneous intervention  [] Coronary arteriography  [] Other, specify       Was the subject re-hospitalized as a result of this AE?  []YES  [x]NO    Outcome/Status  [x] Ongoing  [] Resolved, no sequelae  [] Resolved, permanent sequelae  [] Death  [] Unresolved at time of trial exit    DATE OF RESOLUTION

## 2024-11-19 NOTE — PROGRESS NOTES
SUBJECT ID:  [id number]     TAVR LOW RISK ADVERSE EVENT     ADVERSE EVENT TERM  pneumonia     Describe the adverse event including relevant symptoms, clinical findings and underlying cause  ADMITTED ON sEP2, 2024 FOR EVALUATION OF WEAKNESS, She was noted to be hypoxic requiring oxygen. Her covid testing was positive. She was started on remdesivir and given fluids.  Her oxygen was weaned down.  Her troponins were elevated thought to be due to demand from hypoxia.    She was advised to follow up with her cardiologist.  She did not require oxygen at discharge.  She was discharged to home in stable condition.     Date of onset or first observation  2 SEP 2024     Specify Timeframe:  [] Pre-Implant  [] During Implant  [x] Post-Implant    Date of site's first knowledge of event  19 NOV 2024     Seriousness  [] Not Serious  [x] Serious  If Serious, did the Adverse Event:  [] Lead to Death  [x] Lead to a serious deterioration in the health of the subject, as defined by:   [] A life-threatening illness or injury, or    [] A permanent impairment of a body structure or a body function, including chronic diseases, or    [x] In patient or prolonged existing hospitalization    [x] Medical or surgical intervention to prevent life-threatening illness or injury or permanent impairment to a body structure or body function   [] Lead to fetal distress, fetal death, a congential abnormality or birth defect including physical or mental impairment     Relationships    Relation to the TAVR valve  []Related []Probably Related []Possibly Related []Unlikely Related [x]Not Related []N/A    Relation to the SAVR valve  []Related []Probably Related []Possibly Related []Unlikely Related []Not Related [x]N/A    Relation to the TAVR Delivery Catheter System  []Related []Probably Related []Possibly Related []Unlikely Related [x]Not Related []N/A    Relation to the TAVR Loading System  []Related []Probably Related []Possibly Related []Unlikely Related  [x]Not Related []N/A    Relation to the TAVR implant procedure  []Related []Probably Related []Possibly Related []Unlikely Related [x]Not Related []N/A    Relation to the SAVR implant procedure  []Related []Probably Related []Possibly Related []Unlikely Related []Not Related [x]N/A    Treatment  [] None  [x] Medication, Specify  emdesivir, antibiotics   [] Implantation of permanent pacemaker device   Date of Implant:      [] Catheter intervention on the TAVR or SAVR valve  Date of Implant:      [] Surgical intervention to repair or replace the TAVR or SAVR  Date of Implant:      [] Neurological evaluation   [] Surgical intervention other than to repair or replace the TAVR or SAVR, specify procedure     [] Transfusion or blood products used, specify     [] Percutaneous intervention  [] Coronary arteriography  [] Other, specify       Was the subject re-hospitalized as a result of this AE?  []YES  [x]NO    Outcome/Status  [] Ongoing  [x] Resolved, no sequelae  [] Resolved, permanent sequelae  [] Death  [] Unresolved at time of trial exit    DATE OF RESOLUTION  6 SEP 2024

## 2024-11-19 NOTE — PROGRESS NOTES
SUBJECT ID:  [id number]     TAVR LOW RISK ADVERSE EVENT     ADVERSE EVENT TERM  pneumonia     Describe the adverse event including relevant symptoms, clinical findings and underlying cause  Substernal nonexertional and on traumatic nonradiating chest pain relieved with oral aspirin and nitro by EMS prior to arrival began after laying flat last night with associated nausea, dry cough.  SHE was noted that her O2 saturations have dropped to 88%, she was placed on 2 L of oxygen through nasal cannula and O2 saturations greater 94% at this time.  She does report a dry cough over the past several days but denies any fever or chills.  Chest x-ray demonstrating some bilateral pleural effusions and concern for potential atelectasis or pneumonia  Administer empiric antibiotics, IV ceftriaxone 1 g as well as 500 mg of oral azithromycin.   elevated troponin at 142,KG with no ischemic changes  Administer empiric antibiotics, IV ceftriaxone 1 g as well as 500 mg of oral azithromycin.   elevated troponin at 142,KG with no ischemic changes  She should continue aspirin and lipitor. Echocardiogram was ordered but the patient's symptoms resolved and she was weaned off oxygen so she preferred to get this done as an outpatient  DISCHARGE DON AUGUST 30,2024         Date of onset or first observation  29 aug2024     Specify Timeframe:  [] Pre-Implant  [] During Implant  [x] Post-Implant    Date of site's first knowledge of event  19 nov 2024     Seriousness  [] Not Serious  [x] Serious  If Serious, did the Adverse Event:  [] Lead to Death  [x] Lead to a serious deterioration in the health of the subject, as defined by:   [] A life-threatening illness or injury, or    [] A permanent impairment of a body structure or a body function, including chronic diseases, or    [x] In patient or prolonged existing hospitalization    [x] Medical or surgical intervention to prevent life-threatening illness or injury or permanent impairment to a body  structure or body function   [] Lead to fetal distress, fetal death, a congential abnormality or birth defect including physical or mental impairment     Relationships    Relation to the TAVR valve  []Related []Probably Related []Possibly Related []Unlikely Related []Not Related [x]N/A    Relation to the SAVR valve  []Related []Probably Related []Possibly Related []Unlikely Related [x]Not Related []N/A    Relation to the TAVR Delivery Catheter System  []Related []Probably Related []Possibly Related []Unlikely Related [x]Not Related []N/A    Relation to the TAVR Loading System  []Related []Probably Related []Possibly Related []Unlikely Related [x]Not Related []N/A    Relation to the TAVR implant procedure  []Related []Probably Related []Possibly Related []Unlikely Related [x]Not Related []N/A    Relation to the SAVR implant procedure  []Related []Probably Related []Possibly Related []Unlikely Related []Not Related [x]N/A    Treatment  [] None  [x] Medication, Specify  NITOROGLCERINE. ASPIRIN, LIPITOR, ANTIBIOTICS   [] Implantation of permanent pacemaker device   Date of Implant:      [] Catheter intervention on the TAVR or SAVR valve  Date of Implant:      [] Surgical intervention to repair or replace the TAVR or SAVR  Date of Implant:      [] Neurological evaluation   [] Surgical intervention other than to repair or replace the TAVR or SAVR, specify procedure     [] Transfusion or blood products used, specify     [] Percutaneous intervention  [] Coronary arteriography  [] Other, specify       Was the subject re-hospitalized as a result of this AE?  [x]YES  []NO    Outcome/Status  [] Ongoing  [x] Resolved, no sequelae  [] Resolved, permanent sequelae  [] Death  [] Unresolved at time of trial exit    DATE OF RESOLUTION  30 aug2024

## 2024-11-19 NOTE — PROGRESS NOTES
SUBJECT ID:  [id number]     TAVR LOW RISK ADVERSE EVENT     ADVERSE EVENT TERM  syncope     Describe the adverse event including relevant symptoms, clinical findings and underlying cause  PRESENT  to ED for evaluation of syncope.   EKG is sinus bradycardia without any acute ischemic changes.he underwent an echocardiogram on 6/24/2024 that showed a normal ejection fraction of 67 +/- 5%, grade II left ventricular diastolic dysfunction, mild tricuspid regurgitation,  and mild pulmonary hypertension but no major valvular abnormalities.  arotid ultrasounds showed only mild bilateral internal carotid artery stenosis (0-19%).  It was suspected that dehydration may have lead to her syncopal episode       Date of onset or first observation  23 June 2024     Specify Timeframe:  [] Pre-Implant  [] During Implant  [x] Post-Implant    Date of site's first knowledge of event  19 nov 2024     Seriousness  [] Not Serious  [x] Serious  If Serious, did the Adverse Event:  [] Lead to Death  [] Lead to a serious deterioration in the health of the subject, as defined by:   [] A life-threatening illness or injury, or    [] A permanent impairment of a body structure or a body function, including chronic diseases, or    [x] In patient or prolonged existing hospitalization    [x] Medical or surgical intervention to prevent life-threatening illness or injury or permanent impairment to a body structure or body function   [] Lead to fetal distress, fetal death, a congential abnormality or birth defect including physical or mental impairment     Relationships    Relation to the TAVR valve  []Related []Probably Related []Possibly Related []Unlikely Related [x]Not Related []N/A    Relation to the SAVR valve  []Related []Probably Related []Possibly Related []Unlikely Related []Not Related [x]N/A    Relation to the TAVR Delivery Catheter System  []Related []Probably Related []Possibly Related []Unlikely Related [x]Not Related []N/A    Relation to  the TAVR Loading System  []Related []Probably Related []Possibly Related []Unlikely Related [x]Not Related []N/A    Relation to the TAVR implant procedure  []Related []Probably Related []Possibly Related []Unlikely Related [x]Not Related []N/A    Relation to the SAVR implant procedure  []Related []Probably Related []Possibly Related []Unlikely Related []Not Related [x]N/A    Treatment  [] None  [x] Medication, Specify  zofran   [] Implantation of permanent pacemaker device   Date of Implant:      [] Catheter intervention on the TAVR or SAVR valve  Date of Implant:      [] Surgical intervention to repair or replace the TAVR or SAVR  Date of Implant:      [] Neurological evaluation   [] Surgical intervention other than to repair or replace the TAVR or SAVR, specify procedure     [] Transfusion or blood products used, specify     [] Percutaneous intervention  [] Coronary arteriography  [] Other, specify       Was the subject re-hospitalized as a result of this AE?  [x]YES  []NO    Outcome/Status  [] Ongoing  [x] Resolved, no sequelae  [] Resolved, permanent sequelae  [] Death  [] Unresolved at time of trial exit    DATE OF RESOLUTION  26 June2024

## 2024-11-19 NOTE — PROGRESS NOTES
SUBJECT ID:  [id number]     TAVR LOW RISK ADVERSE EVENT     ADVERSE EVENT TERM  Acute exacerbation of heart failure     Describe the adverse event including relevant symptoms, clinical findings and underlying cause  be admitted for urgent evaluation of aortic insufficiency.   On admission, patient denies chest pain, fevers, chills, n/v/d but endorses worsening fatigue, SOB and CATHERINE since this summer.  She previously had orthopnea and leg swelling but in the last 2 weeks it has improved after diuretic treatment. Patient appears euvolemic on exam, is warm and well compensated, in NAD.Structural consulted for Ruth-TAVR,TTE 10/28: EF 56%, s/p TAVR with Evolut 26, severe aortic stenosis with AV gradients 80/41, Vmax 4.48, DI 0.3, moderate valvular regurgitation with P1/2T 158msec,Moderate MR. ASD with left to right shunt. Svevere pulmonary HTN w/ SPAP of 94mmHg.  Mobile echodensity in LVOT.  -SEN: did not tolerate probe down her throat.  Blood Cx X2  negative rule out endocarditis, LHC: no CAD  Started on losartan 50mg daily, SB improve from 150s to 120s, feeling well. DISCHARGED  Nov 23, 2024       Date of onset or first observation  19 nov 2024     Specify Timeframe:  [] Pre-Implant  [] During Implant  [x] Post-Implant    Date of site's first knowledge of event  19 nov 2024     Seriousness  [] Not Serious  [x] Serious  If Serious, did the Adverse Event:  [] Lead to Death  [] Lead to a serious deterioration in the health of the subject, as defined by:   [] A life-threatening illness or injury, or    [] A permanent impairment of a body structure or a body function, including chronic diseases, or    [x] In patient or prolonged existing hospitalization    [x] Medical or surgical intervention to prevent life-threatening illness or injury or permanent impairment to a body structure or body function   [] Lead to fetal distress, fetal death, a congential abnormality or birth defect including physical or mental impairment      Relationships    Relation to the TAVR valve  [x]Related []Probably Related []Possibly Related []Unlikely Related []Not Related []N/A    Relation to the SAVR valve  []Related []Probably Related []Possibly Related []Unlikely Related []Not Related [x]N/A    Relation to the TAVR Delivery Catheter System  []Related []Probably Related []Possibly Related []Unlikely Related [x]Not Related []N/A    Relation to the TAVR Loading System  []Related []Probably Related []Possibly Related []Unlikely Related [x]Not Related []N/A    Relation to the TAVR implant procedure  []Related []Probably Related []Possibly Related []Unlikely Related [x]Not Related []N/A    Relation to the SAVR implant procedure  []Related []Probably Related []Possibly Related []Unlikely Related []Not Related [x]N/A    Treatment  [] None  [] Medication, Specify  aspirin , midazolam IV, LOSARTAN   [] Implantation of permanent pacemaker device   Date of Implant:      [] Catheter intervention on the TAVR or SAVR valve  Date of Implant:      [] Surgical intervention to repair or replace the TAVR or SAVR  Date of Implant:      [] Neurological evaluation   [] Surgical intervention other than to repair or replace the TAVR or SAVR, specify procedure     [] Transfusion or blood products used, specify     [] Percutaneous intervention  [x] Coronary arteriography  [] Other, specify       Was the subject re-hospitalized as a result of this AE?  [x]YES  []NO    Outcome/Status  [] Ongoing  [x] Resolved, no sequelae  [] Resolved, permanent sequelae  [] Death  [] Unresolved at time of trial exit    DATE OF RESOLUTION  23 nov204

## 2024-11-19 NOTE — H&P
History Of Present Illness  Aure Hdez is a 84 y.o. female, accompanied by her  and daughter, presents with worsening exertional shortness of breath that started earlier this year. Her PMH is significant for CAD, LBBB, aortic stenosis s/p TAVR 03/16/2018, HTN, diastolic heart failure, mitral regurgitation, GERD, depression, SDH 6/2024, and syncope. The patient underwent TAVR on 03/16/2018 and has been followed by Dr. Preciado and Sweetie Dubon, APRN-CNP. She states that she has been admitted to hospital on four separate occasions over the past several months. Patient admits to 3 syncopal episodes in the past year, the most recent in June which resulted in a hospitalization and subdural hematoma. At the time ASA was reversed with DDAVP and patient was monitored in the ICU, and later discharged. Patient advised to hold ASA and follow up for serial head CTs which showed no evidence of acute hemorrhage and an interval decrease in the density of the small left frontal subdural hemorrhage on 8/14/24. She was then admitted to [facility] in early September with COVID pneumonia. The patient was readmitted to hospital later that month with acute on chronic diastolic heart failure. On 09/11/2024, CXR revealed small bilateral pleural effusions. Echocardiogram on 10/28/2024 demonstrated an EF of 56%, restrictive pattern of left ventricular diastolic filling, normal RV systolic function, a large pleural effusion, moderate mitral regurgitation, moderate aortic regurgitation, severely elevated pulmonary artery pressure, atrial septal defect and dilatation of the aortic root. Repeat CXR on 11/12/2024 revealed mild interstitial congestion with effusions and basilar atelectasis. Patient saw Dr. Greenfield on 11/18 who directed patient to be admitted for urgent evaluation of aortic insufficiency. On physical exam,patient denies chest pain, n/v/d. Patient appears euvolemic on exam, is warm and well compensated, in NAD. We will  admit to [facility] for structural heart work up.      Past Medical History  Past Medical History:   Diagnosis Date    Acute upper respiratory infection, unspecified 01/02/2019    Acute URI    Anosmia 12/11/2019    Loss of smell    Body mass index (BMI) 26.0-26.9, adult 11/30/2022    BMI 26.0-26.9,adult    Body mass index (BMI) 27.0-27.9, adult 11/02/2022    BMI 27.0-27.9,adult    Body mass index (BMI)30.0-30.9, adult 06/18/2020    BMI 30.0-30.9,adult    Cellulitis of external ear, unspecified ear 06/30/2014    Cellulitis of earlobe    Cellulitis of face 06/24/2015    Cellulitis, face    Cervicalgia 07/21/2021    Neck pain on right side    Depression, unspecified 01/03/2022    Controlled depression    Diarrhea, unspecified 11/21/2016    Diarrhea in adult patient    Encounter for immunization 10/19/2016    Need for prophylactic vaccination and inoculation against influenza    Fatty (change of) liver, not elsewhere classified 02/01/2018    Fatty (change of) liver, not elsewhere classified    Hordeolum externum unspecified eye, unspecified eyelid 06/24/2015    Stye    Mild intermittent asthma with (acute) exacerbation ([facility]) 01/02/2019    Mild intermittent asthma with acute exacerbation    Nausea with vomiting, unspecified 11/16/2016    Non-intractable vomiting with nausea, unspecified vomiting type    Nonrheumatic aortic (valve) stenosis 03/23/2018    Aortic stenosis, severe    Other chest pain 09/16/2022    Chest heaviness    Other conditions influencing health status 01/14/2019    History of cough    Other forms of angina pectoris 06/19/2020    Equivalent angina    Other specified abnormal findings of blood chemistry     Abnormal liver function test    Other specified disorders of Eustachian tube, unspecified ear 03/05/2015    Eustachian tube dysfunction    Other specified symptoms and signs involving the circulatory and respiratory systems 07/27/2017    Carotid bruit    Pain in left shoulder 01/09/2020    Chronic left  shoulder pain    Pain in right leg 08/05/2020    Pain of right lower extremity    Parageusia 12/11/2019    Loss of taste    Personal history of diseases of the skin and subcutaneous tissue 10/15/2015    History of seborrheic dermatitis    Personal history of other diseases of the circulatory system 02/01/2018    History of hypertension    Personal history of other diseases of the circulatory system 06/11/2015    History of abnormal electrocardiography    Personal history of other diseases of the circulatory system 05/09/2018    History of essential hypertension    Personal history of other diseases of the digestive system     History of cholelithiasis    Personal history of other diseases of the digestive system 02/01/2018    History of hiatal hernia    Personal history of other diseases of the musculoskeletal system and connective tissue 03/23/2018    H/O arthritis    Personal history of other diseases of the nervous system and sense organs 02/11/2015    History of acute conjunctivitis    Personal history of other diseases of the nervous system and sense organs 01/23/2015    History of acute otitis media    Personal history of other diseases of the nervous system and sense organs     History of sleep apnea    Personal history of other diseases of the respiratory system 10/15/2015    History of allergic rhinitis    Personal history of other diseases of the respiratory system 10/15/2015    History of chronic sinusitis    Personal history of other diseases of the respiratory system 04/16/2015    History of acute sinusitis    Personal history of other diseases of the respiratory system 02/01/2018    History of lung disease    Personal history of other diseases of the respiratory system 04/24/2017    History of acute pharyngitis    Personal history of other diseases of the respiratory system 02/28/2017    History of acute bronchitis    Personal history of other diseases of the respiratory system     History of  bronchitis    Personal history of other diseases of urinary system     History of bladder problems    Personal history of other drug therapy     History of postmenopausal hormone replacement therapy    Personal history of other drug therapy     COVID-19 vaccine series completed    Personal history of other endocrine, nutritional and metabolic disease 08/03/2015    History of hyperlipidemia    Personal history of other endocrine, nutritional and metabolic disease 11/16/2016    History of hypokalemia    Personal history of other endocrine, nutritional and metabolic disease 02/01/2018    History of hyperlipidemia    Personal history of other mental and behavioral disorders     History of mental disorder    Personal history of other specified conditions 05/21/2015    History of fatigue    Personal history of other specified conditions 11/30/2022    History of dizziness    Personal history of other specified conditions 01/23/2014    History of urinary frequency    Personal history of other specified conditions 06/14/2021    History of insomnia    Personal history of other specified conditions     History of heartburn    Rash and other nonspecific skin eruption 02/13/2018    Rash    Rectal polyp     Rectal polyp    Right lower quadrant pain 11/16/2016    Abdominal pain, RLQ (right lower quadrant)    Stiffness of left shoulder, not elsewhere classified 01/09/2020    Stiff shoulder, left    Unspecified asthma, uncomplicated ([facility]) 01/14/2019    Asthma in adult without complication, unspecified asthma severity, unspecified whether persistent    Unspecified conjunctivitis 06/24/2015    Conjunctivitis of left eye    Urticaria, unspecified 02/29/2016    Hives of unknown origin    Vomiting, unspecified 11/15/2016    Dry heaves       Surgical History  Past Surgical History:   Procedure Laterality Date    BLADDER SURGERY  02/01/2018    Bladder Surgery    CHOLECYSTECTOMY  02/01/2018    Cholecystectomy    COLONOSCOPY  03/23/2018     Complete Colonoscopy    OTHER SURGICAL HISTORY  05/09/2018    Aortic Valve Replacement Transcatheter    TONSILLECTOMY  03/23/2018    Tonsillectomy    TOTAL VAGINAL HYSTERECTOMY  03/23/2018    Vaginal Hysterectomy        Social History  She reports that she has never smoked. She has never used smokeless tobacco. She reports that she does not currently use alcohol. She reports that she does not use drugs. Patient is a retired banker. She lives with her current  in [place] and has had one divorce in the past. She has 6 grandchildren.     Family History  Family History   Problem Relation Name Age of Onset    Hypertension Mother      Stroke Mother      No Known Problems Father          Allergies  Lovastatin and Montelukast    Review of Systems   Constitutional:  Positive for appetite change. Negative for activity change.   Eyes:  Negative for visual disturbance.   Respiratory:  Positive for cough and shortness of breath. Negative for chest tightness.    Cardiovascular:  Negative for chest pain and leg swelling.   Gastrointestinal:  Negative for abdominal distention, abdominal pain, constipation and diarrhea.   Endocrine: Negative for polyuria.   Genitourinary:  Negative for difficulty urinating and dysuria.   Musculoskeletal:  Negative for arthralgias and myalgias.   Skin:  Negative for color change.   Neurological:  Positive for syncope. Negative for dizziness and headaches.   Hematological:  Does not bruise/bleed easily.   Psychiatric/Behavioral:  Negative for agitation, behavioral problems and confusion.         Physical Exam  Constitutional:       General: She is not in acute distress.     Appearance: Normal appearance.   HENT:      Head: Normocephalic.      Mouth/Throat:      Mouth: Mucous membranes are moist.   Eyes:      Extraocular Movements: Extraocular movements intact.      Pupils: Pupils are equal, round, and reactive to light.   Cardiovascular:      Rate and Rhythm: Normal rate and regular  "rhythm.      Heart sounds: Murmur heard.      Comments: 3/6 systolic murmur best heard over URSB  Pulmonary:      Effort: Pulmonary effort is normal.      Breath sounds: Normal breath sounds. Decreased air movement present.   Abdominal:      General: Abdomen is flat.   Musculoskeletal:      Cervical back: Normal range of motion.   Skin:     General: Skin is warm and dry.   Neurological:      General: No focal deficit present.      Mental Status: She is alert and oriented to person, place, and time.   Psychiatric:         Mood and Affect: Mood normal.         Behavior: Behavior normal.          Last Recorded Vitals  Blood pressure (!) 142/97, pulse 89, temperature 36.8 °C (98.2 °F), temperature source Oral, resp. rate (!) 28, height 1.626 m (5' 4\"), weight 61.2 kg (135 lb), SpO2 (!) 88%.    Last Cardiology Tests:  11/12/2024 - CXR  Mild interstitial congestion with effusions and basilar atelectasis.     10/28/2024 - TTE  1. The left ventricular systolic function is normal, with a Lee's biplane calculated ejection fraction of 56%.  2. Spectral Doppler shows a restrictive pattern of left ventricular diastolic filling.  3. There is normal right ventricular global systolic function.  4. The left atrium is moderately dilated.  5. There is a large pleural effusion.  6. The mitral valve is moderately thickened.  7. There is moderate mitral annular calcification.  8. Moderate mitral valve regurgitation.  9. There is a Medtronic transcatheter aortic valve replacement, with a 26mm reported size.  10. Moderate aortic valve regurgitation.  11. Severely elevated pulmonary artery pressure.  12. There is a moderately sized atrial septal defect.  13. There is moderate dilatation of the aortic root.     09/11/2024 - CTA Chest for PE  1.  Mildly limited study due to motion artifact. No obvious pulmonary emboli.   2. Moderate bilateral pleural effusion with adjacent compressive atelectasis. Additional scattered small regions of " atelectasis.   3.  Scattered small and slightly enlarged mediastinal nodes.      09/11/2024 - CXR  Increasing pleural effusions.      04/19/2023 - TTE  1. Left ventricular systolic function is normal with a 65% estimated ejection fraction.  2. Spectral Doppler shows a pseudonormal pattern of left ventricular diastolic filling.  3. RVSP within normal limits.  4. Aortic valve appears abnormal.  5. There is a transcatheter aortic valve replacement.  6. Left ventricular cavity size is decreased.  7. AV gradient and estimated aortic valve area has changed significantly since prior exam.  8. Today's exam: mean gradient of 33 mmHg, peak gradient 50 mmHg, DI .31, JONNY .9 cm2.  9. Prior exam 4/6/22: Mean gradient 9.3 mm Hg, peak gradient 17 mmHg, DI .49, There is now a small pedunculated echo density seen in LVOT, has been seen previously, suspect relates to valve degeneration, has not changed in appearance from prior exam.     04/06/2022 - TTE  1. The left ventricular systolic function is normal with a 60-65% estimated ejection fraction.  2. Spectral Doppler shows a pseudonormal pattern of left ventricular diastolic filling.  3. RVSP within normal limits.  4. There is a transcatheter aortic valve replacement.  5. The left ventricular cavity size is decreased.     03/17/2021 - TTE  1. The left ventricular systolic function is normal with a 55-60% estimated ejection fraction.  2. LV false tendon present.  3. Spectral Doppler shows an abnormal pattern of left ventricular diastolic filling.  4. RVSP within normal limits.  5. S/p #26 Medtronic Evolut pro plus TAVR with gradients of 16.4/7.1mmHg with no AI.  6. There is a transcatheter aortic valve replacement.  7. Compared with the prior exam from 6/10/2020 there are no sginificant changes. Prior TAVR gradients were 14.6/7mmHg and the RVSP was previously 33mmHg.     06/10/2020 - TTE  1. The left ventricular systolic function is normal with a 60-65% estimated ejection  fraction.  2. Spectral Doppler shows an impaired relaxation pattern of left ventricular diastolic filling.  3. Slightly elevated RVSP.  4. S/p #26 Medtronic Evolut pro-plus TAVR with gradients of 14.6/7mmHg and no AI.  5. There is a transcatheter aortic valve replacement.  6. Compared with the prior exam from 9/16/2019 there are no significant changes. Prior TAVR gradients were 13.1/8.3mmHg with no AI.     09/16/2019 - TTE  1. The left ventricular systolic function is normal with a 60-65% estimated ejection fraction.  2. LV false tendon present.  3. Spectral Doppler shows an impaired relaxation pattern of left ventricular diastolic filling.  4. The left atrium is moderately dilated.  5. There is a flow in the region of the atrial septum which appears to be a small amount of left to right flow suggestive of a PFO, not seen on the exam from 4/10/2019. Recommend limited echo for agitated saline contrast to further assess for possilbe PFO /intracardiac shunt.  6. RVSP within normal limits.  7. There is a transcatheter aortic valve replacement.  8. Compared with the exam from 4/10/2019 there are no significant changes, exept that the possible PFO was not seen on the prior study. Prior TAVR gradients were 17/8mmHg. Prior RVSP was normal at 25.7mmHg.      04/10/2019 - TTE  1. The left ventricular systolic function is normal with a 60-65% estimated ejection fraction.  2. Spectral Doppler shows an abnormal pattern of left ventricular diastolic filling.  3. RVSP within normal limits.  4. S/p #26 Medtronic Evolut TAVR with gradients of 17/8mmHg and no AI.  5. There is a transcatheter aortic valve replacement.  6. Compared with the prior exam from 9/25/2018 there are no significant changes. Prior TAVR gradients were 11/6mmHg. Today's gradients are slightly higher.     09/25/2018 - TTE  1. The left ventricular systolic function is normal with a 60-65% estimated ejection fraction.  2. Spectral Doppler shows an impaired relaxation  pattern of left ventricular diastolic filling.  3. There is a bioprosthetic aortic valve.     04/24/2018 - TTE  1. The left ventricular systolic function is normal with a 65% estimated ejection fraction.  2. Spectral Doppler shows an impaired relaxation pattern of left ventricular diastolic filling.  3. The left atrium is moderately dilated.  4. There is a bioprosthetic aortic valve.     03/17/2018 - TTE  1. The left ventricular systolic function is normal with a 65% estimated ejection fraction.  2. Abnormal septal motion consistent with left bundle branch block.  3. Spectral Doppler shows an impaired relaxation pattern of left ventricular diastolic filling.  4. There is a transcatheter aortic valve replacement.     03/16/2018 - TTE  1. The left ventricular systolic function is normal.  2. Severe aortic valve stenosis.  3. There is severe aortic valve thickening.  4. There is mild aortic valve regurgitation.     03/16/2018 - TAVR  1. Transcatheter aortic valve replacement with successful implantation of EvoluteR Pro 26 mm valve.  2. Patient was enrolled in a research study and data was included in the TVT Registry.     02/21/2018 - TTE  1. The left ventricular systolic function is normal with a 60-65% estimated ejection fraction.  2. LV false tendon present.  3. Spectral Doppler shows an impaired relaxation pattern of left ventricular diastolic filling.  4. RVSP within normal limits.  5. Sever calcific AS with pk/mn gradients of 103/56mmHg with moderate AI ( DI of 0.19).  6. There is moderate aortic valve cusp calcification.  7. There is moderate aortic valve regurgitation.  8. Compared with the prior echo from 7/11/2017 the AV gradients were higher today ( previously measured 68/40mmHg. Still with severe AS.     01/22/2018 - Cardiac Catheterization   1. No significant coronary artery disease with a 50% stenosis in the left circumflex artery.  2. Normal filling pressures and cardiac output.     11/21/2017 - Stress  Test  1. No clinical evidence for ischemia at a maximal infusion.  2. Nuclear image results are reported separately.  3. Pt was in LBBB for majority of test rendering ECG uninterpretable.     07/11/2017 - TTE  1. The left ventricular systolic function is normal.  2. Spectral Doppler shows an impaired relaxation pattern of left ventricular diastolic filling.  3. RVSP within normal limits.  4. JONNY is 0.68 cm2.  5. Severe aortic valve stenosis.  6. There is diffuse moderate to severe aortic valve thickening.  7. There is moderate aortic valve cusp calcification.  8. There is mild aortic valve regurgitation.     Assessment/Plan     Aure is an 84 year old female with a PMH of CAD, LBBB, aortic stenosis s/p TAVR 03/16/2018, HTN, diastolic heart failure, mitral regurgitation, GERD, depression, SDH, and syncope. The patient underwent TAVR on 03/16/2018 and has been followed by Dr. Preciado and Sweetie Dubon, APRN-CNP. She was admitted to [facility] in early September with COVID pneumonia. The patient was readmitted to hospital later that month with acute on chronic diastolic heart failure. Echocardiogram performed 10/28/2024 demonstrated an EF of 56%, restrictive pattern of left ventricular diastolic filling, normal RV systolic function, a large pleural effusion, moderate mitral regurgitation, moderate aortic regurgitation, severely elevated pulmonary artery pressure, moderately sized atrial septal defect and moderate dilatation of the aortic root. Patient has been experiencing worsening shortness of breath which is what brought her in today.  She states that she has been admitted to hospital on four separate occasions over the past several months, noting that prior to 1 admission she had had a syncopal episode. Patient is [facility] and structural heart for further evaluation.      Aortic insufficiency   Hx of Aortic Stenosis s/p TAVR 03/16/2018   - TTE 10/28 EF 56% Aortic Valve: There is a prosthetic aortic  valve present. The aortic valve dimensionless index is 0.30. There is a Medtronic transcatheter aortic valve replacement, with a 26 mm reported size. There is moderate lalo-prosthetic aortic valve regurgitation. There is moderate aortic valve regurgitation. The peak instantaneous gradient of the aortic valve is 80.2 mmHg. The mean gradient of the aortic valve is 41.7 mmHg. In comparison to the previous echocardiogram(s): Compared with study dated 4/19/2023, Deree of AI has increased, trans-AVR gradients are severely increased, RVSP is now severely increased. MR has increased. Mobil eechodenisty is again seen in the LVOT - suggest SEN.   - Continue home ASA 81 mg daily, atorvastatin 20 mg daily   - Consulted structural heart for further evaluation   - will see patient tomorrow, rec blood cultures to r/o endocarditis and SEN  - NPO at midnight for SEN on 11/20    Chronic Diastolic Heart Failure   - EF 56%  - warm and dry on exam, no edema, no JVD  - CXR: Redemonstrated mild bilateral interstitial edema with small bilateral pleural effusions and bibasilar atelectasis.  - Diurese PRN  - BNP downtrending 596 (1151)  - o/p cards had considered Jardiance, but was not started  - had taken lasix daily until she ran out, did not get refilled  - SBP 130s-140s    Depression  - continue home fluoxetine 20mg daily    Shortness of breath   Chronic cough  - Continue home albuterol every 4 hours PRN    Left frontal subdural hemorrhage, 6/24/24  - Admitted to [facility] for syncopal fall with a head injury  - follows with neurology at [facility]  - Initially ASA was held after discharge until 8/21/24 after serial CTs showed no evidence of acute hemorrhage. There was note of an interval decrease in the density of the small left frontal subdural hemorrhage.   - Was cleared for ASA 81mg daily on 8/21  - likely need to discuss with neurology if patient is ok to have heparin products      Dispo: Pending further evaluation  with SEN and structural heart consult   DVT ppx: mechanical ppx  Code Status: Full Code, confirmed upon admission     To be seen and discussed with Dr. Mcdonnell.      Miles Bender DNP, APRN-CNP  Cardiology     [facility]

## 2024-11-19 NOTE — PROGRESS NOTES
SUBJECT ID:  [id number]     TAVR LOW RISK ADVERSE EVENT     ADVERSE EVENT TERM  syncope     Describe the adverse event including relevant symptoms, clinical findings and underlying cause  presents today after syncopal episode. Vital signs are stable in the emergency department. EKG does not show any acute abnormalities. Urinalysis negative for any acute UTI.Troponins are stable and not uptrending. I have a low suspicion for acute cardiac event. CBC did not show any leukocytosis that would be indicative of a systemic infection. No acute anemia seen.  atient felt much better and her blood pressure remained stable with ambulation. At this time, I believe her syncope was likely due to dehydration secondary to a likely gastroenteritis  discharge FROM ED FEB6, 2024.     Date of onset or first observation  6 FEB 2024     Specify Timeframe:  [] Pre-Implant  [] During Implant  [x] Post-Implant    Date of site's first knowledge of event  19 Nov 2024     Seriousness  [] Not Serious  [x] Serious  If Serious, did the Adverse Event:  [] Lead to Death  [] Lead to a serious deterioration in the health of the subject, as defined by:   [] A life-threatening illness or injury, or    [] A permanent impairment of a body structure or a body function, including chronic diseases, or    [] In patient or prolonged existing hospitalization    [x] Medical or surgical intervention to prevent life-threatening illness or injury or permanent impairment to a body structure or body function   [] Lead to fetal distress, fetal death, a congential abnormality or birth defect including physical or mental impairment     Relationships    Relation to the TAVR valve  []Related []Probably Related []Possibly Related []Unlikely Related [x]Not Related []N/A    Relation to the SAVR valve  []Related []Probably Related []Possibly Related []Unlikely Related []Not Related [x]N/A    Relation to the TAVR Delivery Catheter System  []Related []Probably Related  []Possibly Related []Unlikely Related [x]Not Related []N/A    Relation to the TAVR Loading System  []Related []Probably Related []Possibly Related []Unlikely Related [x]Not Related []N/A    Relation to the TAVR implant procedure  []Related []Probably Related []Possibly Related []Unlikely Related [x]Not Related []N/A    Relation to the SAVR implant procedure  []Related []Probably Related []Possibly Related []Unlikely Related []Not Related [x]N/A    Treatment  [] None  [x] Medication, Specify  ZOFRAN. IV FLUID BOULSES, potassium IV   [] Implantation of permanent pacemaker device   Date of Implant:      [] Catheter intervention on the TAVR or SAVR valve  Date of Implant:      [] Surgical intervention to repair or replace the TAVR or SAVR  Date of Implant:      [] Neurological evaluation   [] Surgical intervention other than to repair or replace the TAVR or SAVR, specify procedure     [] Transfusion or blood products used, specify     [] Percutaneous intervention  [] Coronary arteriography  [] Other, specify       Was the subject re-hospitalized as a result of this AE?  []YES  [x]NO    Outcome/Status  [] Ongoing  [x] Resolved, no sequelae  [] Resolved, permanent sequelae  [] Death  [] Unresolved at time of trial exit    DATE OF RESOLUTION  6 FEB2024

## 2024-11-19 NOTE — PROGRESS NOTES
SUBJECT ID:  [id number]     TAVR LOW RISK ADVERSE EVENT     ADVERSE EVENT TERM  DIARRHEA     Describe the adverse event including relevant symptoms, clinical findings and underlying cause  PRESENT TO  ED for evaluation of worsening watery diarrhea   described as very runny and black tarry diarrhea, associated with   abdominal discomfort, nausea and vomiting.    GI was consulted for melena.  Her diarrhea resolved.  Her hemoglobin stayed stable. Diarrhea may be related to COVID-19 infection     Date of onset or first observation  2 sep2024     Specify Timeframe:  [] Pre-Implant  [] During Implant  [x] Post-Implant    Date of site's first knowledge of event  19 nov 2024     Seriousness  [] Not Serious  [x] Serious  If Serious, did the Adverse Event:  [] Lead to Death  [x] Lead to a serious deterioration in the health of the subject, as defined by:   [] A life-threatening illness or injury, or    [] A permanent impairment of a body structure or a body function, including chronic diseases, or    [x] In patient or prolonged existing hospitalization    [x] Medical or surgical intervention to prevent life-threatening illness or injury or permanent impairment to a body structure or body function   [] Lead to fetal distress, fetal death, a congential abnormality or birth defect including physical or mental impairment     Relationships    Relation to the TAVR valve  []Related []Probably Related []Possibly Related []Unlikely Related [x]Not Related []N/A    Relation to the SAVR valve  []Related []Probably Related []Possibly Related []Unlikely Related []Not Related [x]N/A    Relation to the TAVR Delivery Catheter System  []Related []Probably Related []Possibly Related []Unlikely Related [x]Not Related []N/A    Relation to the TAVR Loading System  []Related []Probably Related []Possibly Related []Unlikely Related [x]Not Related []N/A    Relation to the TAVR implant procedure  []Related []Probably Related []Possibly Related  []Unlikely Related [x]Not Related []N/A    Relation to the SAVR implant procedure  []Related []Probably Related []Possibly Related []Unlikely Related [x]Not Related []N/A    Treatment  [] None  [x] Medication, Specify  PPI   [] Implantation of permanent pacemaker device   Date of Implant:      [] Catheter intervention on the TAVR or SAVR valve  Date of Implant:      [] Surgical intervention to repair or replace the TAVR or SAVR  Date of Implant:      [] Neurological evaluation   [] Surgical intervention other than to repair or replace the TAVR or SAVR, specify procedure     [] Transfusion or blood products used, specify     [] Percutaneous intervention  [] Coronary arteriography  [] Other, specify       Was the subject re-hospitalized as a result of this AE?  [x]YES  []NO    Outcome/Status  [] Ongoing  [x] Resolved, no sequelae  [] Resolved, permanent sequelae  [] Death  [] Unresolved at time of trial exit    DATE OF RESOLUTION  6 SEP2024

## 2024-11-19 NOTE — ED PROVIDER NOTES
CC: Shortness of Breath     HPI:  Patient is a 84-year-old female with a past medical history of aortic stenosis s/p TAVR, CAD, LBBB, HTN, mitral regurg, and diastolic heart failure who presents to the ED for chronic shortness of breath as advised by her cardiologist for TAVR.  Patient has been noted to have chronic exertional dyspnea.  She was noted to be scheduled for a TAVR.  Patient was advised to present to the emergency department to be monitored before TAVR is performed.  Patient notes cough with mucus.  She notes chronic dyspnea on exertion.  Denies any other new symptoms including but not limited to fevers, chills, nausea, vomiting, chest pain, headache, trauma, falls, history of blood clots, abdominal pain, neck pain, and back pain.    Limitations to history: None  Independent historian(s): Patient  Records Reviewed: Recent available ED and inpatient notes reviewed in EMR.    PMHx/PSHx:  Per HPI.   - has a past medical history of Acute upper respiratory infection, unspecified (01/02/2019), Anosmia (12/11/2019), Body mass index (BMI) 26.0-26.9, adult (11/30/2022), Body mass index (BMI) 27.0-27.9, adult (11/02/2022), Body mass index (BMI)30.0-30.9, adult (06/18/2020), Cellulitis of external ear, unspecified ear (06/30/2014), Cellulitis of face (06/24/2015), Cervicalgia (07/21/2021), Depression, unspecified (01/03/2022), Diarrhea, unspecified (11/21/2016), Encounter for immunization (10/19/2016), Fatty (change of) liver, not elsewhere classified (02/01/2018), Hordeolum externum unspecified eye, unspecified eyelid (06/24/2015), Mild intermittent asthma with (acute) exacerbation ([facility]) (01/02/2019), Nausea with vomiting, unspecified (11/16/2016), Nonrheumatic aortic (valve) stenosis (03/23/2018), Other chest pain (09/16/2022), Other conditions influencing health status (01/14/2019), Other forms of angina pectoris (06/19/2020), Other specified abnormal findings of blood chemistry, Other specified disorders of  Eustachian tube, unspecified ear (03/05/2015), Other specified symptoms and signs involving the circulatory and respiratory systems (07/27/2017), Pain in left shoulder (01/09/2020), Pain in right leg (08/05/2020), Parageusia (12/11/2019), Personal history of diseases of the skin and subcutaneous tissue (10/15/2015), Personal history of other diseases of the circulatory system (02/01/2018), Personal history of other diseases of the circulatory system (06/11/2015), Personal history of other diseases of the circulatory system (05/09/2018), Personal history of other diseases of the digestive system, Personal history of other diseases of the digestive system (02/01/2018), Personal history of other diseases of the musculoskeletal system and connective tissue (03/23/2018), Personal history of other diseases of the nervous system and sense organs (02/11/2015), Personal history of other diseases of the nervous system and sense organs (01/23/2015), Personal history of other diseases of the nervous system and sense organs, Personal history of other diseases of the respiratory system (10/15/2015), Personal history of other diseases of the respiratory system (10/15/2015), Personal history of other diseases of the respiratory system (04/16/2015), Personal history of other diseases of the respiratory system (02/01/2018), Personal history of other diseases of the respiratory system (04/24/2017), Personal history of other diseases of the respiratory system (02/28/2017), Personal history of other diseases of the respiratory system, Personal history of other diseases of urinary system, Personal history of other drug therapy, Personal history of other drug therapy, Personal history of other endocrine, nutritional and metabolic disease (08/03/2015), Personal history of other endocrine, nutritional and metabolic disease (11/16/2016), Personal history of other endocrine, nutritional and metabolic disease (02/01/2018), Personal history of  other mental and behavioral disorders, Personal history of other specified conditions (05/21/2015), Personal history of other specified conditions (11/30/2022), Personal history of other specified conditions (01/23/2014), Personal history of other specified conditions (06/14/2021), Personal history of other specified conditions, Rash and other nonspecific skin eruption (02/13/2018), Rectal polyp, Right lower quadrant pain (11/16/2016), Stiffness of left shoulder, not elsewhere classified (01/09/2020), Unspecified asthma, uncomplicated ([facility]) (01/14/2019), Unspecified conjunctivitis (06/24/2015), Urticaria, unspecified (02/29/2016), and Vomiting, unspecified (11/15/2016).  - has a past surgical history that includes Bladder surgery (02/01/2018); Colonoscopy (03/23/2018); Cholecystectomy (02/01/2018); Total vaginal hysterectomy (03/23/2018); Tonsillectomy (03/23/2018); and Other surgical history (05/09/2018).    Medications:  Reviewed in EMR. See EMR for complete list of medications and doses.    Allergies:  Lovastatin and Montelukast    Social History:  - Tobacco:  reports that she has never smoked. She has never used smokeless tobacco.   - Alcohol:  reports that she does not currently use alcohol.   - Illicit Drugs:  reports no history of drug use.     ROS:  Per HPI.       ???????????????????????????????????????????????????????????????  Triage Vitals:  T 36.8 °C (98.2 °F)  HR 87  /57  RR 20  O2 95 %      Physical Exam  Vitals and nursing note reviewed.   Constitutional:       General: She is not in acute distress.  HENT:      Head: Normocephalic and atraumatic.      Nose: Nose normal.      Mouth/Throat:      Mouth: Mucous membranes are moist.   Eyes:      Conjunctiva/sclera: Conjunctivae normal.   Cardiovascular:      Rate and Rhythm: Normal rate and regular rhythm.      Pulses: Normal pulses.      Comments: Systolic murmur best appreciated at upper right systolic border  Pulmonary:      Effort:  Pulmonary effort is normal. No respiratory distress.      Breath sounds: Normal breath sounds.   Abdominal:      Palpations: Abdomen is soft.      Tenderness: There is no abdominal tenderness.   Skin:     General: Skin is warm.   Neurological:      General: No focal deficit present.      Mental Status: She is alert.         ???????????????????????????????????????????????????????????????  Labs:   Labs Reviewed   CBC WITH AUTO DIFFERENTIAL - Abnormal       Result Value    WBC 6.6      nRBC 0.0      RBC 3.89 (*)     Hemoglobin 11.3 (*)     Hematocrit 33.5 (*)     MCV 86      MCH 29.0      MCHC 33.7      RDW 13.5      Platelets 184      Neutrophils % 65.8      Immature Granulocytes %, Automated 0.3      Lymphocytes % 18.5      Monocytes % 6.4      Eosinophils % 8.4      Basophils % 0.6      Neutrophils Absolute 4.33      Immature Granulocytes Absolute, Automated 0.02      Lymphocytes Absolute 1.22      Monocytes Absolute 0.42      Eosinophils Absolute 0.55 (*)     Basophils Absolute 0.04     B-TYPE NATRIURETIC PEPTIDE - Abnormal     (*)     Narrative:        <100 pg/mL - Heart failure unlikely  100-299 pg/mL - Intermediate probability of acute heart                  failure exacerbation. Correlate with clinical                  context and patient history.    >=300 pg/mL - Heart Failure likely. Correlate with clinical                  context and patient history.     Biotin interference may cause falsely decreased results. Patients taking a Biotin dose of up to 5 mg/day should refrain from taking Biotin for 24 hours before sample  collection. Providers may contact their local laboratory for further information.   SERIAL TROPONIN-INITIAL - Abnormal    Troponin I, High Sensitivity (CMC) 188 (*)     Narrative:     Less than 99th percentile of normal range cutoff-  Female and children under 18 years old <35 ng/L; Male <54 ng/L: Negative  Repeat testing should be performed if clinically indicated.     Female and  children under 18 years old  ng/L; Male  ng/L:  Consistent with possible cardiac damage and possible increased clinical   risk. Serial measurements may help to assess extent of myocardial damage.     >120 ng/L: Consistent with cardiac damage, increased clinical risk and  myocardial infarction. Serial measurements may help assess extent of   myocardial damage.      NOTE: Children less than 1 year old may have higher baseline troponin   levels and results should be interpreted in conjunction with the overall   clinical context.    NOTE: Troponin I testing is performed using a different   testing methodology at [facility] than at other   [facility]. Direct result comparisons should only   be made within the same method.     COMPREHENSIVE METABOLIC PANEL - Normal    Glucose 77      Sodium 136      Potassium 4.1      Chloride 103      Bicarbonate 25      Anion Gap 12      Urea Nitrogen 14      Creatinine 0.88      eGFR 64      Calcium 9.1      Albumin 4.3      Alkaline Phosphatase 54      Total Protein 7.2      AST 30      Bilirubin, Total 0.8      ALT 13     TROPONIN SERIES- (INITIAL, 1 HR)    Narrative:     The following orders were created for panel order Troponin I Series, High Sensitivity (0, 1 HR).  Procedure                               Abnormality         Status                     ---------                               -----------         ------                     Troponin I, High Sensiti...[[id number]]  Abnormal            Final result               Troponin, High Sensitivi...[[id number]]                                                   Please view results for these tests on the individual orders.   SERIAL TROPONIN, 1 HOUR        Imaging:   XR chest 2 views   Final Result   1.  Redemonstrated mild bilateral interstitial edema with small   bilateral pleural effusions and bibasilar atelectasis.                  MACRO:   None        Signed by: Juan Martinez 11/19/2024 11:59 AM    Dictation workstation:   [id number]           EKG:  Rate is 85, sinus rhythm, normal axis, no interval prolongation, no st elevation or depression.  When compared to EKG on 4/19/2023 review of EKG does not show any signs of STEMI, complete heart block, asystole, V-fib.    MDM:  Patient is a 84-year-old female with a past medical history of aortic stenosis s/p TAVR, CAD, LBBB, HTN, mitral regurg, and diastolic heart failure who presents to the ED for chronic shortness of breath as advised by her cardiologist for TAVR.  Patient presented HDS.  Physical exam findings of for 66-year-old female no acute distress.  She noted a systolic murmur best appreciated at the upper right systolic border.  Low clinical concern for PE and pneumothorax.  Low clinical concern for ACS with elevated troponins likely secondary to heart failure, unremarkable EKG, and patient denying chest pain.  Metabolic panel and CBC were unremarkable. BNP noted to be elevated at 596 but downtrending from previous BNP at 1151 from 10/10/2024.  Discussed ED findings and admission with patient.  Patient stated understanding agreement the plan.  All questions were answered.  Discussed patient presentation with admitting team.  Patient mid to cardiology in stable condition.    ED Course:  Diagnoses as of 11/20/24 1319   Aortic valve stenosis, etiology of cardiac valve disease unspecified       Social Determinants Limiting Care:  None identified    Disposition:  Admission to cardiology    Darian Bloom MD   Emergency Medicine PGY-3  [facility]    Comment: Please note this report has been produced using speech recognition software and may contain errors related to that system including errors in grammar, punctuation, and spelling as well as words and phrases that may be inappropriate.  If there are any questions or concerns please feel free to contact the dictating provider for clarification.    Procedures ? Smart[employer]  last updated 11/19/2024 12:49 PM        Darian Bloom MD  Resident  11/20/24 [id number]

## 2024-11-19 NOTE — PROGRESS NOTES
SUBJECT ID:  [id number]     TAVR LOW RISK ADVERSE EVENT     ADVERSE EVENT TERM  aortic STENOSIS     Describe the adverse event including relevant symptoms, clinical findings and underlying cause  patient admitted on sep 2, 2024 due to o the ED for evaluation of worsening watery diarrhea,, noted to have  SHORTNESS OF BREATH , TTE  WAS DONE ON SEP 5,2024 SHOWED Sever AShere is moderate (2+) paravalvularaortic valve regurgitation.  ms might suggest severe regurgitation.There is severe aortic valve stenosis. Thepeak gradient is 66 mmHg, the mean gradient is 39 mmHg and the dimensionless  valve index is 0.38  She was advised to follow up with her cardiologist  he underwent TVP for TAVR on Dec11,2024,alve used: 20 mm Marie 3 ultra + 2 cc     Date of onset or first observation  5 sep2024     Specify Timeframe:  [] Pre-Implant  [] During Implant  [x] Post-Implant    Date of site's first knowledge of event  19 nov 2024     Seriousness  [] Not Serious  [x] Serious  If Serious, did the Adverse Event:  [] Lead to Death  [x] Lead to a serious deterioration in the health of the subject, as defined by:   [] A life-threatening illness or injury, or    [] A permanent impairment of a body structure or a body function, including chronic diseases, or    [] In patient or prolonged existing hospitalization    [x] Medical or surgical intervention to prevent life-threatening illness or injury or permanent impairment to a body structure or body function   [] Lead to fetal distress, fetal death, a congential abnormality or birth defect including physical or mental impairment     Relationships    Relation to the TAVR valve  [x]Related []Probably Related []Possibly Related []Unlikely Related []Not Related []N/A    Relation to the SAVR valve  []Related []Probably Related []Possibly Related []Unlikely Related []Not Related [x]N/A    Relation to the TAVR Delivery Catheter System  []Related []Probably Related []Possibly Related []Unlikely  Related [x]Not Related []N/A    Relation to the TAVR Loading System  []Related []Probably Related []Possibly Related []Unlikely Related [x]Not Related []N/A    Relation to the TAVR implant procedure  []Related []Probably Related []Possibly Related []Unlikely Related [x]Not Related []N/A    Relation to the SAVR implant procedure  []Related []Probably Related []Possibly Related []Unlikely Related []Not Related [x]N/A    Treatment  [] None  [x] Medication, Specify  lasix   [] Implantation of permanent pacemaker device   Date of Implant:      [x] Catheter intervention on the TAVR or SAVR valve  Date of Implant:   11 dec 2024   [] Surgical intervention to repair or replace the TAVR or SAVR  Date of Implant:      [] Neurological evaluation   [] Surgical intervention other than to repair or replace the TAVR or SAVR, specify procedure     [] Transfusion or blood products used, specify     [] Percutaneous intervention  [] Coronary arteriography  [] Other, specify       Was the subject re-hospitalized as a result of this AE?  []YES  [x]NO    Outcome/Status  [] Ongoing  [x] Resolved, no sequelae  [] Resolved, permanent sequelae  [] Death  [] Unresolved at time of trial exit    DATE OF RESOLUTION  11 dec2024

## 2024-11-19 NOTE — PROGRESS NOTES
SUBJECT ID:  [id number]     TAVR LOW RISK ADVERSE EVENT     ADVERSE EVENT TERM  Acute exacerbation of heart failure)     Describe the adverse event including relevant symptoms, clinical findings and underlying cause  resents to the emergency department for worsening shortness of breath, lower extremity edema, orthopnea  CT chest shows bilateral pleural effusions on admission, patient was   started on diuretics, her symptoms improved, patient seen by cardiology and recommendations are to continue with medical treatment  beta-blockers were added to her regimen to support heart function and blood pressure with continuation of diuretics, symptoms improved and patient discharged on sep 13,2024    CTA Chest for PE No obvious pulmonary emboli       Date of onset or first observation  11 SEP 2024     Specify Timeframe:  [] Pre-Implant  [] During Implant  [x] Post-Implant    Date of site's first knowledge of event  19 NOV 2024     Seriousness  [] Not Serious  [x] Serious  If Serious, did the Adverse Event:  [] Lead to Death  [x] Lead to a serious deterioration in the health of the subject, as defined by:   [] A life-threatening illness or injury, or    [] A permanent impairment of a body structure or a body function, including chronic diseases, or    [x] In patient or prolonged existing hospitalization    [x] Medical or surgical intervention to prevent life-threatening illness or injury or permanent impairment to a body structure or body function   [] Lead to fetal distress, fetal death, a congential abnormality or birth defect including physical or mental impairment     Relationships    Relation to the TAVR valve  [x]Related []Probably Related []Possibly Related []Unlikely Related []Not Related []N/A    Relation to the SAVR valve  []Related []Probably Related []Possibly Related []Unlikely Related []Not Related [x]N/A    Relation to the TAVR Delivery Catheter System  []Related []Probably Related []Possibly Related []Unlikely  Related [x]Not Related []N/A    Relation to the TAVR Loading System  []Related []Probably Related []Possibly Related []Unlikely Related [x]Not Related []N/A    Relation to the TAVR implant procedure  []Related []Probably Related []Possibly Related []Unlikely Related [x]Not Related []N/A    Relation to the SAVR implant procedure  []Related []Probably Related []Possibly Related []Unlikely Related []Not Related [x]N/A    Treatment  [] None  [x] Medication, Specify  lasix   [] Implantation of permanent pacemaker device   Date of Implant:      [] Catheter intervention on the TAVR or SAVR valve  Date of Implant:      [] Surgical intervention to repair or replace the TAVR or SAVR  Date of Implant:      [] Neurological evaluation   [] Surgical intervention other than to repair or replace the TAVR or SAVR, specify procedure     [] Transfusion or blood products used, specify     [] Percutaneous intervention  [] Coronary arteriography  [] Other, specify       Was the subject re-hospitalized as a result of this AE?  [x]YES  []NO    Outcome/Status  [] Ongoing  [x] Resolved, no sequelae  [] Resolved, permanent sequelae  [] Death  [] Unresolved at time of trial exit    DATE OF RESOLUTION  13 sep 2024

## 2024-11-19 NOTE — ED TRIAGE NOTES
Pt here for sob and cough. States there is all concern that she may have to have another TAVR. States that she has been more sob and coughing and worsening heart failure. TAVR MD is napoleon (sp)

## 2024-11-19 NOTE — Clinical Note
Sheath was exchanged in the left femoral artery with SHEATH, PINNACLE, 10 CM,  6FR INTRODUCER, 6FR DAVI, 2.5 CM DIALATOR.

## 2024-11-20 ENCOUNTER — APPOINTMENT (OUTPATIENT)
Dept: RADIOLOGY | Facility: HOSPITAL | Age: 84
DRG: 287 | End: 2024-11-20
Payer: MEDICARE

## 2024-11-20 ENCOUNTER — APPOINTMENT (OUTPATIENT)
Dept: CARDIOLOGY | Facility: HOSPITAL | Age: 84
DRG: 287 | End: 2024-11-20
Payer: MEDICARE

## 2024-11-20 LAB
ALBUMIN SERPL BCP-MCNC: 4.1 G/DL (ref 3.4–5)
ANION GAP SERPL CALC-SCNC: 10 MMOL/L (ref 10–20)
ATRIAL RATE: 85 BPM
BUN SERPL-MCNC: 14 MG/DL (ref 6–23)
CALCIUM SERPL-MCNC: 9.1 MG/DL (ref 8.6–10.6)
CHLORIDE SERPL-SCNC: 106 MMOL/L (ref 98–107)
CO2 SERPL-SCNC: 27 MMOL/L (ref 21–32)
CREAT SERPL-MCNC: 0.96 MG/DL (ref 0.5–1.05)
EGFRCR SERPLBLD CKD-EPI 2021: 58 ML/MIN/1.73M*2
ERYTHROCYTE [DISTWIDTH] IN BLOOD BY AUTOMATED COUNT: 13.8 % (ref 11.5–14.5)
GLUCOSE SERPL-MCNC: 86 MG/DL (ref 74–99)
HCT VFR BLD AUTO: 34.3 % (ref 36–46)
HGB BLD-MCNC: 11.1 G/DL (ref 12–16)
MAGNESIUM SERPL-MCNC: 1.99 MG/DL (ref 1.6–2.4)
MCH RBC QN AUTO: 28.5 PG (ref 26–34)
MCHC RBC AUTO-ENTMCNC: 32.4 G/DL (ref 32–36)
MCV RBC AUTO: 88 FL (ref 80–100)
NRBC BLD-RTO: 0 /100 WBCS (ref 0–0)
P AXIS: 16 DEGREES
P OFFSET: 193 MS
P ONSET: 138 MS
PHOSPHATE SERPL-MCNC: 3.9 MG/DL (ref 2.5–4.9)
PLATELET # BLD AUTO: 179 X10*3/UL (ref 150–450)
POTASSIUM SERPL-SCNC: 4.3 MMOL/L (ref 3.5–5.3)
PR INTERVAL: 164 MS
Q ONSET: 220 MS
QRS COUNT: 14 BEATS
QRS DURATION: 80 MS
QT INTERVAL: 386 MS
QTC CALCULATION(BAZETT): 459 MS
QTC FREDERICIA: 433 MS
R AXIS: 40 DEGREES
RBC # BLD AUTO: 3.9 X10*6/UL (ref 4–5.2)
SODIUM SERPL-SCNC: 139 MMOL/L (ref 136–145)
T AXIS: 71 DEGREES
T OFFSET: 413 MS
VENTRICULAR RATE: 85 BPM
WBC # BLD AUTO: 7.1 X10*3/UL (ref 4.4–11.3)

## 2024-11-20 PROCEDURE — 99152 MOD SED SAME PHYS/QHP 5/>YRS: CPT

## 2024-11-20 PROCEDURE — 36415 COLL VENOUS BLD VENIPUNCTURE: CPT | Performed by: NURSE PRACTITIONER

## 2024-11-20 PROCEDURE — 99223 1ST HOSP IP/OBS HIGH 75: CPT | Performed by: INTERNAL MEDICINE

## 2024-11-20 PROCEDURE — 99152 MOD SED SAME PHYS/QHP 5/>YRS: CPT | Performed by: INTERNAL MEDICINE

## 2024-11-20 PROCEDURE — 2500000005 HC RX 250 GENERAL PHARMACY W/O HCPCS: Performed by: INTERNAL MEDICINE

## 2024-11-20 PROCEDURE — 1200000002 HC GENERAL ROOM WITH TELEMETRY DAILY

## 2024-11-20 PROCEDURE — 74174 CTA ABD&PLVS W/CONTRAST: CPT | Performed by: RADIOLOGY

## 2024-11-20 PROCEDURE — 74174 CTA ABD&PLVS W/CONTRAST: CPT

## 2024-11-20 PROCEDURE — 80069 RENAL FUNCTION PANEL: CPT | Performed by: NURSE PRACTITIONER

## 2024-11-20 PROCEDURE — 2550000001 HC RX 255 CONTRASTS: Performed by: NURSE PRACTITIONER

## 2024-11-20 PROCEDURE — 2500000001 HC RX 250 WO HCPCS SELF ADMINISTERED DRUGS (ALT 637 FOR MEDICARE OP): Performed by: NURSE PRACTITIONER

## 2024-11-20 PROCEDURE — 2500000005 HC RX 250 GENERAL PHARMACY W/O HCPCS

## 2024-11-20 PROCEDURE — 83735 ASSAY OF MAGNESIUM: CPT | Performed by: NURSE PRACTITIONER

## 2024-11-20 PROCEDURE — 2500000004 HC RX 250 GENERAL PHARMACY W/ HCPCS (ALT 636 FOR OP/ED)

## 2024-11-20 PROCEDURE — 71275 CT ANGIOGRAPHY CHEST: CPT | Performed by: RADIOLOGY

## 2024-11-20 PROCEDURE — 85027 COMPLETE CBC AUTOMATED: CPT | Performed by: NURSE PRACTITIONER

## 2024-11-20 RX ORDER — LIDOCAINE HYDROCHLORIDE 20 MG/ML
SOLUTION OROPHARYNGEAL
Status: COMPLETED
Start: 2024-11-20 | End: 2024-11-20

## 2024-11-20 RX ORDER — MIDAZOLAM HYDROCHLORIDE 1 MG/ML
INJECTION INTRAMUSCULAR; INTRAVENOUS
Status: COMPLETED
Start: 2024-11-20 | End: 2024-11-20

## 2024-11-20 RX ORDER — FENTANYL CITRATE 50 UG/ML
INJECTION, SOLUTION INTRAMUSCULAR; INTRAVENOUS
Status: COMPLETED
Start: 2024-11-20 | End: 2024-11-20

## 2024-11-20 SDOH — ECONOMIC STABILITY: FOOD INSECURITY: WITHIN THE PAST 12 MONTHS, THE FOOD YOU BOUGHT JUST DIDN'T LAST AND YOU DIDN'T HAVE MONEY TO GET MORE.: NEVER TRUE

## 2024-11-20 SDOH — SOCIAL STABILITY: SOCIAL INSECURITY
WITHIN THE LAST YEAR, HAVE YOU BEEN RAPED OR FORCED TO HAVE ANY KIND OF SEXUAL ACTIVITY BY YOUR PARTNER OR EX-PARTNER?: NO

## 2024-11-20 SDOH — ECONOMIC STABILITY: FOOD INSECURITY: WITHIN THE PAST 12 MONTHS, YOU WORRIED THAT YOUR FOOD WOULD RUN OUT BEFORE YOU GOT THE MONEY TO BUY MORE.: NEVER TRUE

## 2024-11-20 SDOH — HEALTH STABILITY: MENTAL HEALTH: HOW OFTEN DO YOU HAVE SIX OR MORE DRINKS ON ONE OCCASION?: NEVER

## 2024-11-20 SDOH — SOCIAL STABILITY: SOCIAL INSECURITY: WITHIN THE LAST YEAR, HAVE YOU BEEN AFRAID OF YOUR PARTNER OR EX-PARTNER?: NO

## 2024-11-20 SDOH — ECONOMIC STABILITY: INCOME INSECURITY: IN THE PAST 12 MONTHS HAS THE ELECTRIC, GAS, OIL, OR WATER COMPANY THREATENED TO SHUT OFF SERVICES IN YOUR HOME?: NO

## 2024-11-20 SDOH — SOCIAL STABILITY: SOCIAL INSECURITY: HAS ANYONE EVER THREATENED TO HURT YOUR FAMILY OR YOUR PETS?: NO

## 2024-11-20 SDOH — SOCIAL STABILITY: SOCIAL INSECURITY: HAVE YOU HAD THOUGHTS OF HARMING ANYONE ELSE?: NO

## 2024-11-20 SDOH — SOCIAL STABILITY: SOCIAL INSECURITY: ABUSE: ADULT

## 2024-11-20 SDOH — SOCIAL STABILITY: SOCIAL INSECURITY
WITHIN THE LAST YEAR, HAVE YOU BEEN KICKED, HIT, SLAPPED, OR OTHERWISE PHYSICALLY HURT BY YOUR PARTNER OR EX-PARTNER?: NO

## 2024-11-20 SDOH — SOCIAL STABILITY: SOCIAL INSECURITY: DOES ANYONE TRY TO KEEP YOU FROM HAVING/CONTACTING OTHER FRIENDS OR DOING THINGS OUTSIDE YOUR HOME?: NO

## 2024-11-20 SDOH — SOCIAL STABILITY: SOCIAL INSECURITY: WITHIN THE LAST YEAR, HAVE YOU BEEN HUMILIATED OR EMOTIONALLY ABUSED IN OTHER WAYS BY YOUR PARTNER OR EX-PARTNER?: NO

## 2024-11-20 SDOH — HEALTH STABILITY: MENTAL HEALTH: HOW OFTEN DO YOU HAVE A DRINK CONTAINING ALCOHOL?: NEVER

## 2024-11-20 SDOH — SOCIAL STABILITY: SOCIAL INSECURITY: WERE YOU ABLE TO COMPLETE ALL THE BEHAVIORAL HEALTH SCREENINGS?: YES

## 2024-11-20 SDOH — SOCIAL STABILITY: SOCIAL INSECURITY: HAVE YOU HAD ANY THOUGHTS OF HARMING ANYONE ELSE?: NO

## 2024-11-20 SDOH — SOCIAL STABILITY: SOCIAL INSECURITY: DO YOU FEEL UNSAFE GOING BACK TO THE PLACE WHERE YOU ARE LIVING?: NO

## 2024-11-20 SDOH — HEALTH STABILITY: MENTAL HEALTH: HOW MANY DRINKS CONTAINING ALCOHOL DO YOU HAVE ON A TYPICAL DAY WHEN YOU ARE DRINKING?: PATIENT DOES NOT DRINK

## 2024-11-20 SDOH — SOCIAL STABILITY: SOCIAL INSECURITY: ARE YOU OR HAVE YOU BEEN THREATENED OR ABUSED PHYSICALLY, EMOTIONALLY, OR SEXUALLY BY ANYONE?: NO

## 2024-11-20 SDOH — SOCIAL STABILITY: SOCIAL INSECURITY: ARE THERE ANY APPARENT SIGNS OF INJURIES/BEHAVIORS THAT COULD BE RELATED TO ABUSE/NEGLECT?: NO

## 2024-11-20 SDOH — SOCIAL STABILITY: SOCIAL INSECURITY: DO YOU FEEL ANYONE HAS EXPLOITED OR TAKEN ADVANTAGE OF YOU FINANCIALLY OR OF YOUR PERSONAL PROPERTY?: NO

## 2024-11-20 ASSESSMENT — COGNITIVE AND FUNCTIONAL STATUS - GENERAL
DAILY ACTIVITIY SCORE: 24
MOBILITY SCORE: 24
MOBILITY SCORE: 24
PATIENT BASELINE BEDBOUND: NO
DAILY ACTIVITIY SCORE: 24

## 2024-11-20 ASSESSMENT — PATIENT HEALTH QUESTIONNAIRE - PHQ9
SUM OF ALL RESPONSES TO PHQ9 QUESTIONS 1 & 2: 0
2. FEELING DOWN, DEPRESSED OR HOPELESS: NOT AT ALL
1. LITTLE INTEREST OR PLEASURE IN DOING THINGS: NOT AT ALL

## 2024-11-20 ASSESSMENT — ACTIVITIES OF DAILY LIVING (ADL)
FEEDING YOURSELF: INDEPENDENT
LACK_OF_TRANSPORTATION: NO
JUDGMENT_ADEQUATE_SAFELY_COMPLETE_DAILY_ACTIVITIES: YES
ADEQUATE_TO_COMPLETE_ADL: YES
LACK_OF_TRANSPORTATION: NO
DRESSING YOURSELF: INDEPENDENT
BATHING: INDEPENDENT
HEARING - LEFT EAR: FUNCTIONAL
WALKS IN HOME: INDEPENDENT
PATIENT'S MEMORY ADEQUATE TO SAFELY COMPLETE DAILY ACTIVITIES?: YES
GROOMING: INDEPENDENT
HEARING - RIGHT EAR: FUNCTIONAL
LACK_OF_TRANSPORTATION: NO
TOILETING: INDEPENDENT

## 2024-11-20 ASSESSMENT — PAIN - FUNCTIONAL ASSESSMENT
PAIN_FUNCTIONAL_ASSESSMENT: 0-10

## 2024-11-20 ASSESSMENT — PAIN SCALES - GENERAL
PAINLEVEL_OUTOF10: 0 - NO PAIN

## 2024-11-20 ASSESSMENT — LIFESTYLE VARIABLES
HOW OFTEN DO YOU HAVE A DRINK CONTAINING ALCOHOL: NEVER
PRESCIPTION_ABUSE_PAST_12_MONTHS: NO
AUDIT-C TOTAL SCORE: 0
AUDIT-C TOTAL SCORE: 0
SUBSTANCE_ABUSE_PAST_12_MONTHS: NO
SKIP TO QUESTIONS 9-10: 1
SKIP TO QUESTIONS 9-10: 1
AUDIT-C TOTAL SCORE: 0
HOW OFTEN DO YOU HAVE 6 OR MORE DRINKS ON ONE OCCASION: NEVER
HOW MANY STANDARD DRINKS CONTAINING ALCOHOL DO YOU HAVE ON A TYPICAL DAY: PATIENT DOES NOT DRINK

## 2024-11-20 NOTE — CARE PLAN
The patient's goals for the shift include  patient will have decreased sob    The clinical goals for the shift include safety    Over the shift, the patient did not make progress toward the following goals.     Problem: Safety - Adult  Goal: Free from fall injury  Outcome: Progressing  Flowsheets (Taken 11/20/2024 0104)  Free from fall injury:   Instruct family/caregiver on patient safety   Based on caregiver fall risk screen, instruct family/caregiver to ask for assistance with transferring infant if caregiver noted to have fall risk factors     Problem: Discharge Planning  Goal: Discharge to home or other facility with appropriate resources  Outcome: Progressing  Flowsheets (Taken 11/20/2024 0104)  Discharge to home or other facility with appropriate resources: Identify barriers to discharge with patient and caregiver

## 2024-11-20 NOTE — CONSULTS
"Nutrition Initial Assessment:   Nutrition Assessment    Reason for Assessment: Admission nursing screening    Patient is a 84 y.o. female presenting with Aortic valve stenosis. The patient underwent TAVR on 03/16/2018 and has been followed by Dr. Preciado and LAURA Duffy-CNP. She was admitted to [facility] in early September with COVID pneumonia. The patient was readmitted to hospital later that month with acute on chronic diastolic heart failure. Echocardiogram performed 10/28/2024 demonstrated an EF of 56%, restrictive pattern of left ventricular diastolic filling, normal RV systolic function, a large pleural effusion, moderate mitral regurgitation, moderate aortic regurgitation, severely elevated pulmonary artery pressure, moderately sized atrial septal defect and moderate dilatation of the aortic root. Patient has been experiencing worsening shortness of breath which is what brought her in.    PMHx of CAD, LBBB, aortic stenosis s/p TAVR 03/16/2018, HTN, diastolic heart failure, mitral regurgitation, GERD, depression, SDH, and syncope       Nutrition History:  Energy Intake: Poor < 50 %  Food and Nutrient History: Pt reports having a poor appetite since January and typically only eats 50% of her meals. Pt reports splitting her meals in half and saving it for later. Pt reports that sometimes she will eat all of her food but will only prepare a small amount since she knows she won't eat much. Pt amenable to try Ensure Plus.  Food Allergies/Intolerances:  None  GI Symptoms: None  Oral Problems: None       Anthropometrics:  Height: 162.6 cm (5' 4\")   Weight: 61.2 kg (134 lb 14.7 oz)   BMI (Calculated): 23.15  IBW/kg (Dietitian Calculated): 54.5 kg  Percent of IBW: 112 %       Weight History:   Wt Readings from Last 20 Encounters:   11/20/24 61.2 kg (134 lb 14.7 oz)   11/18/24 61.2 kg (135 lb)   11/12/24 61.7 kg (136 lb)   11/05/24 61.7 kg (136 lb)   10/28/24 63 kg (139 lb)   09/19/24 65.2 kg (143 lb " 12.8 oz)   09/17/24 65.2 kg (143 lb 11.2 oz)   08/05/24 66.2 kg (146 lb)   07/24/24 66.7 kg (147 lb)   07/23/24 65.8 kg (145 lb 1 oz)   07/01/24 66.8 kg (147 lb 3.2 oz)   03/27/24 64.9 kg (143 lb)   02/13/24 65.3 kg (144 lb)   02/05/24 66.2 kg (146 lb)   10/30/23 66.3 kg (146 lb 3.2 oz)   10/23/23 66.7 kg (147 lb)   08/07/23 66.7 kg (147 lb)   04/19/23 67.4 kg (148 lb 9.6 oz)   02/08/23 69.9 kg (154 lb)   12/12/22 70.8 kg (156 lb)       Weight Change %:  Weight History / % Weight Change: Pt reports noticing a 20 lb weight loss x 1 year. Per chart, pt with 2.9% weight loss x 1 month and 7.6% weight loss x 3 months. Prior to 09/2024, weight appeared relateively stable.  Significant Weight Loss: Yes  Interpretation of Weight Loss: >7.5% in 3 months    Nutrition Focused Physical Exam Findings:    Subcutaneous Fat Loss:   Orbital Fat Pads: Mild-Moderate (slight dark circles and slight hollowing)  Buccal Fat Pads: Mild-Moderate (flat cheeks, minimal bounce)  Triceps: Mild-Moderate (less than ample fat tissue)  Muscle Wasting:  Temporalis: Severe (hollowed scooping depression)  Pectoralis (Clavicular Region): Mild-Moderate (some protrusion of clavicle)  Deltoid/Trapezius: Mild-Moderate (slight protrusion of acromion process)  Interosseous: Severe (depressed area between thumb and forefinger)  Trapezius/Infraspinatus/Supraspinatus (Scapular Region): Mild-Moderate (slight protrusion of scapula)  Quadriceps: Mild-Moderate (mild depression on inner and outer thigh)  Gastrocnemius: Mild-Moderate (not well developed muscle)  Edema:  Edema: none  Physical Findings:  Skin: Negative    Nutrition Significant Labs:  CBC Trend:   Results from last 7 days   Lab Units 11/19/24  1121   WBC AUTO x10*3/uL 6.6   RBC AUTO x10*6/uL 3.89*   HEMOGLOBIN g/dL 11.3*   HEMATOCRIT % 33.5*   MCV fL 86   PLATELETS AUTO x10*3/uL 184    , BG POCT trend:    , Liver Function Trend:   Results from last 7 days   Lab Units 11/19/24  1121   ALK PHOS U/L 54    AST U/L 30   ALT U/L 13   BILIRUBIN TOTAL mg/dL 0.8    , Renal Lab Trend:   Results from last 7 days   Lab Units 11/19/24  1121   POTASSIUM mmol/L 4.1   SODIUM mmol/L 136   EGFR mL/min/1.73m*2 64   BUN mg/dL 14   CREATININE mg/dL 0.88    , Vit D:   Lab Results   Component Value Date    VITD25 67 07/15/2023       Nutrition Specific Medications:  Scheduled medications  aspirin, 81 mg, oral, Daily  atorvastatin, 20 mg, oral, Nightly  cholecalciferol, 5,000 Units, oral, Daily  famotidine, 20 mg, oral, Daily  FLUoxetine, 20 mg, oral, Daily  fluticasone, 1 spray, Each Nostril, Daily  polyethylene glycol, 17 g, oral, Daily      Continuous medications     PRN medications  PRN medications: acetaminophen, albuterol      I/O:    ;      Dietary Orders (From admission, onward)       Start     Ordered    11/20/24 0033  May Participate in Room Service  ( ROOM SERVICE MAY PARTICIPATE)  Once        Question:  .  Answer:  Yes    11/20/24 0032    11/20/24 0001  NPO Diet; Effective midnight  Diet effective midnight         11/19/24 1604                     Estimated Needs:   Total Energy Estimated Needs (kCal):  (1835 kcal)  Method for Estimating Needs: 30 kcal/kg CBW  Total Protein Estimated Needs (g):  (60-75 g)  Method for Estimating Needs: 1.0-1.2 g/kg CBW  Total Fluid Estimated Needs (mL):  (Per med team)           Nutrition Diagnosis   Malnutrition Diagnosis  Patient has Malnutrition Diagnosis: Yes  Diagnosis Status: New  Malnutrition Diagnosis: Severe malnutrition related to chronic disease or condition  As Evidenced by: </= 75% EER for >/= 1 month, 7.6% weight loss x 3 months, moderate-severe muscle wasting and moderate fat wasting            Nutrition Interventions/Recommendations         Nutrition Prescription:  Individualized Nutrition Prescription Provided for : 1835 kcal, 60-75 g pro, continue regular diet upon advancement        Nutrition Interventions:   Interventions: Medical food supplement  Medical Food  Supplement: Commercial beverage  Goal: Ensure Plus (350 kcal, 13 g pro) Chocolate BID with breakfast and dinner         Nutrition Education:   Discussed NFPE findings and adequate nutrition for healing/recovery       Nutrition Monitoring and Evaluation   Food/Nutrient Related History Monitoring  Monitoring and Evaluation Plan: Energy intake, Amount of food  Energy Intake: Estimated energy intake  Criteria: Consume >50% EER  Amount of Food: Estimated amout of food, Medical food intake  Criteria: Consume >50% meals and ONS    Body Composition/Growth/Weight History  Monitoring and Evaluation Plan: Weight change  Weight Change: Weight gain, Weight loss  Criteria: Maintain stable weight    Biochemical Data, Medical Tests and Procedures  Monitoring and Evaluation Plan: Electrolyte/renal panel, Glucose/endocrine profile, Nutritional anemia profile, Vitamin profile  Criteria: WNL              Time Spent (min): 60 minutes

## 2024-11-20 NOTE — PROGRESS NOTES
"Subjective   Patient feels well today and is sitting up in bed. She states she slept okay and had no issues overnight. No worsening SOB. Denies chest pain. Patient appears euvolemic and in NAD. Awaiting SEN today.    Today in Brief:   - Follow up with structural heart recs concerning TAVR replacement  - SEN planned for today  - CT TAVR ordered     Objective     Physical Exam  Constitutional:       Appearance: Normal appearance.   HENT:      Head: Normocephalic and atraumatic.      Mouth/Throat:      Mouth: Mucous membranes are moist.   Eyes:      Extraocular Movements: Extraocular movements intact.      Pupils: Pupils are equal, round, and reactive to light.   Cardiovascular:      Rate and Rhythm: Normal rate and regular rhythm.      Heart sounds: Murmur heard.      Comments: 3/6 systolic murmur best heard at URSB  Pulmonary:      Effort: Pulmonary effort is normal. No respiratory distress.      Breath sounds: Normal breath sounds.   Abdominal:      General: Abdomen is flat.   Musculoskeletal:      Cervical back: Normal range of motion.      Right lower leg: No edema.      Left lower leg: No edema.   Skin:     General: Skin is warm and dry.   Neurological:      General: No focal deficit present.      Mental Status: She is alert and oriented to person, place, and time.   Psychiatric:         Mood and Affect: Mood normal.         Behavior: Behavior normal.         Last Recorded Vitals  Blood pressure 155/56, pulse 88, temperature 36.2 °C (97.2 °F), temperature source Temporal, resp. rate 20, height 1.626 m (5' 4\"), weight 61.2 kg (134 lb 14.7 oz), SpO2 94%.  Intake/Output last 3 Shifts:  No intake/output data recorded.    Relevant Results  Scheduled medications  aspirin, 81 mg, oral, Daily  atorvastatin, 20 mg, oral, Nightly  cholecalciferol, 5,000 Units, oral, Daily  famotidine, 20 mg, oral, Daily  FLUoxetine, 20 mg, oral, Daily  fluticasone, 1 spray, Each Nostril, Daily  polyethylene glycol, 17 g, oral, " Daily      Continuous medications     PRN medications  PRN medications: acetaminophen, albuterol  Results for orders placed or performed during the hospital encounter of 11/19/24 (from the past 24 hours)   CBC with Differential   Result Value Ref Range    WBC 6.6 4.4 - 11.3 x10*3/uL    nRBC 0.0 0.0 - 0.0 /100 WBCs    RBC 3.89 (L) 4.00 - 5.20 x10*6/uL    Hemoglobin 11.3 (L) 12.0 - 16.0 g/dL    Hematocrit 33.5 (L) 36.0 - 46.0 %    MCV 86 80 - 100 fL    MCH 29.0 26.0 - 34.0 pg    MCHC 33.7 32.0 - 36.0 g/dL    RDW 13.5 11.5 - 14.5 %    Platelets 184 150 - 450 x10*3/uL    Neutrophils % 65.8 40.0 - 80.0 %    Immature Granulocytes %, Automated 0.3 0.0 - 0.9 %    Lymphocytes % 18.5 13.0 - 44.0 %    Monocytes % 6.4 2.0 - 10.0 %    Eosinophils % 8.4 0.0 - 6.0 %    Basophils % 0.6 0.0 - 2.0 %    Neutrophils Absolute 4.33 1.60 - 5.50 x10*3/uL    Immature Granulocytes Absolute, Automated 0.02 0.00 - 0.50 x10*3/uL    Lymphocytes Absolute 1.22 0.80 - 3.00 x10*3/uL    Monocytes Absolute 0.42 0.05 - 0.80 x10*3/uL    Eosinophils Absolute 0.55 (H) 0.00 - 0.40 x10*3/uL    Basophils Absolute 0.04 0.00 - 0.10 x10*3/uL   Comprehensive Metabolic Panel   Result Value Ref Range    Glucose 77 74 - 99 mg/dL    Sodium 136 136 - 145 mmol/L    Potassium 4.1 3.5 - 5.3 mmol/L    Chloride 103 98 - 107 mmol/L    Bicarbonate 25 21 - 32 mmol/L    Anion Gap 12 10 - 20 mmol/L    Urea Nitrogen 14 6 - 23 mg/dL    Creatinine 0.88 0.50 - 1.05 mg/dL    eGFR 64 >60 mL/min/1.73m*2    Calcium 9.1 8.6 - 10.6 mg/dL    Albumin 4.3 3.4 - 5.0 g/dL    Alkaline Phosphatase 54 33 - 136 U/L    Total Protein 7.2 6.4 - 8.2 g/dL    AST 30 9 - 39 U/L    Bilirubin, Total 0.8 0.0 - 1.2 mg/dL    ALT 13 7 - 45 U/L   Brain Natriuretic Peptide   Result Value Ref Range     (H) 0 - 99 pg/mL   Troponin I, High Sensitivity, Initial   Result Value Ref Range    Troponin I, High Sensitivity (CMC) 188 (HH) 0 - 34 ng/L   Troponin, High Sensitivity, 1 Hour   Result Value Ref Range     Troponin I, High Sensitivity (CMC) 164 (HH) 0 - 34 ng/L   ECG 12 lead   Result Value Ref Range    Ventricular Rate 85 BPM    Atrial Rate 85 BPM    CA Interval 164 ms    QRS Duration 80 ms    QT Interval 386 ms    QTC Calculation(Bazett) 459 ms    P Axis 16 degrees    R Axis 40 degrees    T Axis 71 degrees    QRS Count 14 beats    Q Onset 220 ms    P Onset 138 ms    P Offset 193 ms    T Offset 413 ms    QTC Fredericia 433 ms   Blood Culture    Specimen: Peripheral Venipuncture; Blood culture   Result Value Ref Range    Blood Culture Loaded on Instrument - Culture in progress    Blood Culture    Specimen: Peripheral Venipuncture; Blood culture   Result Value Ref Range    Blood Culture Loaded on Instrument - Culture in progress      Last Cardiology Tests:  11/12/2024 - CXR  Mild interstitial congestion with effusions and basilar atelectasis.     10/28/2024 - TTE  1. The left ventricular systolic function is normal, with a Lee's biplane calculated ejection fraction of 56%.  2. Spectral Doppler shows a restrictive pattern of left ventricular diastolic filling.  3. There is normal right ventricular global systolic function.  4. The left atrium is moderately dilated.  5. There is a large pleural effusion.  6. The mitral valve is moderately thickened.  7. There is moderate mitral annular calcification.  8. Moderate mitral valve regurgitation.  9. There is a Medtronic transcatheter aortic valve replacement, with a 26mm reported size.  10. Moderate aortic valve regurgitation.  11. Severely elevated pulmonary artery pressure.  12. There is a moderately sized atrial septal defect.  13. There is moderate dilatation of the aortic root.     09/11/2024 - CTA Chest for PE  1.  Mildly limited study due to motion artifact. No obvious pulmonary emboli.   2. Moderate bilateral pleural effusion with adjacent compressive atelectasis. Additional scattered small regions of atelectasis.   3.  Scattered small and slightly enlarged  mediastinal nodes.      09/11/2024 - CXR  Increasing pleural effusions.      04/19/2023 - TTE  1. Left ventricular systolic function is normal with a 65% estimated ejection fraction.  2. Spectral Doppler shows a pseudonormal pattern of left ventricular diastolic filling.  3. RVSP within normal limits.  4. Aortic valve appears abnormal.  5. There is a transcatheter aortic valve replacement.  6. Left ventricular cavity size is decreased.  7. AV gradient and estimated aortic valve area has changed significantly since prior exam.  8. Today's exam: mean gradient of 33 mmHg, peak gradient 50 mmHg, DI .31, JONNY .9 cm2.  9. Prior exam 4/6/22: Mean gradient 9.3 mm Hg, peak gradient 17 mmHg, DI .49, There is now a small pedunculated echo density seen in LVOT, has been seen previously, suspect relates to valve degeneration, has not changed in appearance from prior exam.    03/16/2018 - TAVR  1. Transcatheter aortic valve replacement with successful implantation of EvoluteR Pro 26 mm valve.  2. Patient was enrolled in a research study and data was included in the TVT Registry.            Assessment/Plan   Aure is an 84 year old female with a PMH of CAD, LBBB, aortic stenosis s/p TAVR 03/16/2018, HTN, diastolic heart failure, mitral regurgitation, GERD, depression, SDH, and syncope. The patient underwent TAVR on 03/16/2018 and has been followed by Dr. Preciado and Sweetie Dubon, LAURA-CNP. She was admitted to [facility] in early September with COVID pneumonia. The patient was readmitted to hospital later that month with acute on chronic diastolic heart failure. Echocardiogram performed 10/28/2024 demonstrated an EF of 56%, restrictive pattern of left ventricular diastolic filling, normal RV systolic function, a large pleural effusion, moderate mitral regurgitation, moderate aortic regurgitation, severely elevated pulmonary artery pressure, moderately sized atrial septal defect and moderate dilatation of the aortic root.  Patient has been experiencing worsening shortness of breath which is what brought her in today.  She states that she has been admitted to hospital on four separate occasions over the past several months, noting that prior to 1 admission she had had a syncopal episode. Patient is admitted to [facility] and structural heart for further evaluation.      Aortic insufficiency   Hx of Aortic Stenosis s/p TAVR 03/16/2018   - TTE 10/28 EF 56% Aortic Valve: There is a prosthetic aortic valve present. The aortic valve dimensionless index is 0.30. There is a Medtronic transcatheter aortic valve replacement, with a 26 mm reported size. There is moderate lalo-prosthetic aortic valve regurgitation. There is moderate aortic valve regurgitation. The peak instantaneous gradient of the aortic valve is 80.2 mmHg. The mean gradient of the aortic valve is 41.7 mmHg. In comparison to the previous echocardiogram(s): Compared with study dated 4/19/2023, Deree of AI has increased, trans-AVR gradients are severely increased, RVSP is now severely increased. MR has increased. Mobil eechodenisty is again seen in the LVOT - suggest SEN.   - Continue home ASA 81 mg daily, atorvastatin 20 mg daily   - Consulted structural heart for further evaluation   - will see patient tomorrow, rec blood cultures to r/o endocarditis and SEN  - SEN planned for 11/20  - Follow up structural heart recs concerning TAVR replacement  - Ordered CT TAVR     Chronic Diastolic Heart Failure   - EF 56%  - warm and dry on exam, no edema, no JVD  - CXR: Redemonstrated mild bilateral interstitial edema with small bilateral pleural effusions and bibasilar atelectasis.  - Diurese PRN  - BNP downtrending 596 (1151)  - o/p cards had considered Jardiance, but was not started  - had taken lasix daily until she ran out, did not get refilled  - SBP 130s-140s     Depression  - continue home fluoxetine 20mg daily     Shortness of breath   Chronic cough  - Continue home albuterol every 4  hours PRN     Left frontal subdural hemorrhage, 6/24/24  - Admitted to [facility] for syncopal fall with a head injury  - follows with neurology at [facility]  - Initially ASA was held after discharge until 8/21/24 after serial CTs showed no evidence of acute hemorrhage. There was note of an interval decrease in the density of the small left frontal subdural hemorrhage.   - Was cleared for ASA 81mg daily on 8/21  - likely need to discuss with neurology if patient is ok to have heparin products        Dispo: Pending further evaluation with SEN and structural heart consult   DVT ppx: mechanical ppx  Code Status: Full Code, confirmed upon admission       Seen and discussed with Dr. Mcdonnell.        Miles Bender DNP, APRN-CNP  Cardiology      JAMAL FrostS

## 2024-11-20 NOTE — PROGRESS NOTES
11/20/24 0936   Discharge Planning   Living Arrangements Spouse/significant other   Support Systems Spouse/significant other   Assistance Needed none   Type of Residence Private residence   Number of Stairs to Enter Residence 2   Number of Stairs Within Residence 0   Do you have animals or pets at home? Yes   Type of Animals or Pets Cats   Home or Post Acute Services None   Expected Discharge Disposition Home   Financial Resource Strain   How hard is it for you to pay for the very basics like food, housing, medical care, and heating? Not very   Housing Stability   In the last 12 months, was there a time when you were not able to pay the mortgage or rent on time? N   In the past 12 months, how many times have you moved where you were living? 1   At any time in the past 12 months, were you homeless or living in a shelter (including now)? N   Transportation Needs   In the past 12 months, has lack of transportation kept you from medical appointments or from getting medications? no   In the past 12 months, has lack of transportation kept you from meetings, work, or from getting things needed for daily living? No   Patient Choice   Provider Choice list and CMS website (https://medicare.gov/care-compare#search) for post-acute Quality and Resource Measure Data were provided and reviewed with: Patient   Patient / Family choosing to utilize agency / facility established prior to hospitalization No   Stroke Family Assessment   Stroke Family Assessment Needed No     Transitional Care Coordination Progress Note:  Patient discussed during interdisciplinary rounds.   Team members present: MICAELA MOSS MD   Plan per Medical/Surgical team: Pending further evaluation with SEN and structural heart consult   Payor: [employer]   Discharge disposition: Home   Potential Barriers: None   ADOD: 11-      Previous Home Care: None   DME: None   Pharmacy: [facility]   Falls: June 2024   PCP:  CARLOS ENRIQUE MCGUIRE   Dialysis: None

## 2024-11-21 ENCOUNTER — APPOINTMENT (OUTPATIENT)
Dept: RADIOLOGY | Facility: HOSPITAL | Age: 84
DRG: 287 | End: 2024-11-21
Payer: MEDICARE

## 2024-11-21 ENCOUNTER — APPOINTMENT (OUTPATIENT)
Dept: CARDIOLOGY | Facility: HOSPITAL | Age: 84
DRG: 287 | End: 2024-11-21
Payer: MEDICARE

## 2024-11-21 LAB
ALBUMIN SERPL BCP-MCNC: 3.7 G/DL (ref 3.4–5)
ANION GAP SERPL CALC-SCNC: 12 MMOL/L (ref 10–20)
BUN SERPL-MCNC: 16 MG/DL (ref 6–23)
CALCIUM SERPL-MCNC: 8.5 MG/DL (ref 8.6–10.6)
CHLORIDE SERPL-SCNC: 104 MMOL/L (ref 98–107)
CO2 SERPL-SCNC: 25 MMOL/L (ref 21–32)
CREAT SERPL-MCNC: 0.85 MG/DL (ref 0.5–1.05)
EGFRCR SERPLBLD CKD-EPI 2021: 67 ML/MIN/1.73M*2
ERYTHROCYTE [DISTWIDTH] IN BLOOD BY AUTOMATED COUNT: 13.7 % (ref 11.5–14.5)
GLUCOSE SERPL-MCNC: 86 MG/DL (ref 74–99)
HCT VFR BLD AUTO: 32.2 % (ref 36–46)
HGB BLD-MCNC: 10.2 G/DL (ref 12–16)
MAGNESIUM SERPL-MCNC: 1.83 MG/DL (ref 1.6–2.4)
MCH RBC QN AUTO: 28.4 PG (ref 26–34)
MCHC RBC AUTO-ENTMCNC: 31.7 G/DL (ref 32–36)
MCV RBC AUTO: 90 FL (ref 80–100)
NRBC BLD-RTO: 0 /100 WBCS (ref 0–0)
PHOSPHATE SERPL-MCNC: 4.3 MG/DL (ref 2.5–4.9)
PLATELET # BLD AUTO: 169 X10*3/UL (ref 150–450)
POTASSIUM SERPL-SCNC: 3.7 MMOL/L (ref 3.5–5.3)
RBC # BLD AUTO: 3.59 X10*6/UL (ref 4–5.2)
SODIUM SERPL-SCNC: 137 MMOL/L (ref 136–145)
WBC # BLD AUTO: 6.3 X10*3/UL (ref 4.4–11.3)

## 2024-11-21 PROCEDURE — 99222 1ST HOSP IP/OBS MODERATE 55: CPT | Performed by: NEUROLOGICAL SURGERY

## 2024-11-21 PROCEDURE — 99233 SBSQ HOSP IP/OBS HIGH 50: CPT | Performed by: INTERNAL MEDICINE

## 2024-11-21 PROCEDURE — 36415 COLL VENOUS BLD VENIPUNCTURE: CPT | Performed by: NURSE PRACTITIONER

## 2024-11-21 PROCEDURE — 83735 ASSAY OF MAGNESIUM: CPT | Performed by: NURSE PRACTITIONER

## 2024-11-21 PROCEDURE — 99223 1ST HOSP IP/OBS HIGH 75: CPT | Performed by: PHYSICIAN ASSISTANT

## 2024-11-21 PROCEDURE — 80069 RENAL FUNCTION PANEL: CPT | Performed by: NURSE PRACTITIONER

## 2024-11-21 PROCEDURE — 70450 CT HEAD/BRAIN W/O DYE: CPT

## 2024-11-21 PROCEDURE — 2500000002 HC RX 250 W HCPCS SELF ADMINISTERED DRUGS (ALT 637 FOR MEDICARE OP, ALT 636 FOR OP/ED)

## 2024-11-21 PROCEDURE — 2500000001 HC RX 250 WO HCPCS SELF ADMINISTERED DRUGS (ALT 637 FOR MEDICARE OP): Performed by: NURSE PRACTITIONER

## 2024-11-21 PROCEDURE — 85027 COMPLETE CBC AUTOMATED: CPT | Performed by: NURSE PRACTITIONER

## 2024-11-21 PROCEDURE — 2500000001 HC RX 250 WO HCPCS SELF ADMINISTERED DRUGS (ALT 637 FOR MEDICARE OP)

## 2024-11-21 PROCEDURE — 1200000002 HC GENERAL ROOM WITH TELEMETRY DAILY

## 2024-11-21 PROCEDURE — 2500000004 HC RX 250 GENERAL PHARMACY W/ HCPCS (ALT 636 FOR OP/ED)

## 2024-11-21 RX ORDER — POTASSIUM CHLORIDE 20 MEQ/1
20 TABLET, EXTENDED RELEASE ORAL ONCE
Status: COMPLETED | OUTPATIENT
Start: 2024-11-21 | End: 2024-11-21

## 2024-11-21 RX ORDER — LOSARTAN POTASSIUM 25 MG/1
25 TABLET ORAL DAILY
Status: DISCONTINUED | OUTPATIENT
Start: 2024-11-21 | End: 2024-11-22

## 2024-11-21 RX ORDER — LANOLIN ALCOHOL/MO/W.PET/CERES
400 CREAM (GRAM) TOPICAL ONCE
Status: COMPLETED | OUTPATIENT
Start: 2024-11-21 | End: 2024-11-21

## 2024-11-21 ASSESSMENT — COGNITIVE AND FUNCTIONAL STATUS - GENERAL
DAILY ACTIVITIY SCORE: 24
DAILY ACTIVITIY SCORE: 24
MOBILITY SCORE: 24
MOBILITY SCORE: 24

## 2024-11-21 ASSESSMENT — ENCOUNTER SYMPTOMS
MUSCULOSKELETAL NEGATIVE: 1
GASTROINTESTINAL NEGATIVE: 1
CONSTITUTIONAL NEGATIVE: 1
EYES NEGATIVE: 1
CARDIOVASCULAR NEGATIVE: 1
RESPIRATORY NEGATIVE: 1
NEUROLOGICAL NEGATIVE: 1
ENDOCRINE NEGATIVE: 1

## 2024-11-21 ASSESSMENT — PAIN - FUNCTIONAL ASSESSMENT: PAIN_FUNCTIONAL_ASSESSMENT: 0-10

## 2024-11-21 ASSESSMENT — PAIN SCALES - GENERAL
PAINLEVEL_OUTOF10: 0 - NO PAIN
PAINLEVEL_OUTOF10: 0 - NO PAIN

## 2024-11-21 NOTE — CONSULTS
Reason for Consult: AI s/p TAVR    CARDIAC SURGERY CONSULT NOTE    HISTORY OF PRESENT ILLNESS  Aure Hdez is a 84 y.o. female with a history significant for CAD, LBBB, aortic stenosis s/p TAVR 03/16/2018, HTN, diastolic heart failure, mitral regurgitation, GERD, depression, SDH 6/2024, and syncope. The patient underwent TAVR on 03/16/2018 and has been followed by Dr. Preciado and Sweetie Dubon, APRN-CNP. She states that she has been admitted to hospital on four separate occasions over the past several months. Patient admits to 3 syncopal episodes in the past year, the most recent in June which resulted in a hospitalization and subdural hematoma which later decreased in size on follow-up. She was then admitted to [facility] in early September with COVID pneumonia. The patient was readmitted to hospital later that month with acute on chronic diastolic heart failure.      Echocardiogram on 10/28/2024 demonstrated an EF of 56%, moderate mitral regurgitation, moderate aortic regurgitation and dilatation of the aortic root and a mobile echodensity in the LVOT. Patient saw Dr. Greenfield on 11/18 who directed patient to be admitted for urgent evaluation of aortic insufficiency.     She states she previously had orthopnea and leg swelling but in the last 2 weeks it has improved after diuretic treatment. She occasionally has dizziness and syncope, as described above. She is independent in ADLs and IADLs.     Cardiac surgery and structural heart both consulted to determine the best treatment option for the patient's AI.     TTE 10/2024  PHYSICIAN INTERPRETATION:  Left Ventricle: The left ventricular systolic function is normal, with a Lee's biplane calculated ejection fraction of 56%. There are no regional left ventricular wall motion abnormalities. The left ventricular cavity size is normal. Spectral Doppler shows a restrictive pattern of left ventricular diastolic filling.  Left Atrium: The left atrium is moderately  dilated. There is a moderately sized atrial septal defect with predominantly left to right shunting across the atrial septum; demonstrated using color Doppler. A bubble study using agitated saline was not performed.  Right Ventricle: The right ventricle is normal in size. There is normal right ventricular global systolic function.  Right Atrium: The right atrium is normal in size.  Aortic Valve: There is a prosthetic aortic valve present. The aortic valve dimensionless index is 0.30. There is a Medtronic transcatheter aortic valve replacement, with a 26 mm reported size. There is moderate lalo-prosthetic aortic valve regurgitation. There is moderate aortic valve regurgitation. The peak instantaneous gradient of the aortic valve is 80.2 mmHg. The mean gradient of the aortic valve is 41.7 mmHg.  Mitral Valve: The mitral valve is moderately thickened. There is moderate mitral annular calcification. There is moderate mitral valve regurgitation which is centrally directed.  Tricuspid Valve: The tricuspid valve is structurally normal. There is mild tricuspid regurgitation.  Pulmonic Valve: The pulmonic valve is structurally normal. There is physiologic pulmonic valve regurgitation.  Pericardium: Trivial pericardial effusion.  Pleural: There is a large left pleural effusion.  Aorta: The aortic root is abnormal. There is moderate dilatation the aortic root.  Pulmonary Artery: The tricuspid regurgitant velocity is 3.85 m/s, and with an estimated right atrial pressure of 15 mmHg, the estimated pulmonary artery pressure is severely elevated with the RVSP at 74.4 mmHg.  In comparison to the previous echocardiogram(s): Compared with study dated 4/19/2023, Deree of AI has increased, trans-AVR gradients are severely increased, RVSP is now severely increased. MR has increased. Mobil eechodenisty is again seen in the LVOT - suggest SEN.     CONCLUSIONS:   1. The left ventricular systolic function is normal, with a Lee's biplane  calculated ejection fraction of 56%.   2. Spectral Doppler shows a restrictive pattern of left ventricular diastolic filling.   3. There is normal right ventricular global systolic function.   4. The left atrium is moderately dilated.   5. There is a large pleural effusion.   6. The mitral valve is moderately thickened.   7. There is moderate mitral annular calcification.   8. Moderate mitral valve regurgitation.   9. There is a Medtronic transcatheter aortic valve replacement, with a 26mm reported size.  10. Moderate aortic valve regurgitation.  11. Severely elevated pulmonary artery pressure.  12. There is a moderately sized atrial septal defect.  13. There is moderate dilatation of the aortic root.    Subjective   Past Medical History:   Diagnosis Date    Acute upper respiratory infection, unspecified 01/02/2019    Acute URI    Anosmia 12/11/2019    Loss of smell    Body mass index (BMI) 26.0-26.9, adult 11/30/2022    BMI 26.0-26.9,adult    Body mass index (BMI) 27.0-27.9, adult 11/02/2022    BMI 27.0-27.9,adult    Body mass index (BMI)30.0-30.9, adult 06/18/2020    BMI 30.0-30.9,adult    Cellulitis of external ear, unspecified ear 06/30/2014    Cellulitis of earlobe    Cellulitis of face 06/24/2015    Cellulitis, face    Cervicalgia 07/21/2021    Neck pain on right side    Depression, unspecified 01/03/2022    Controlled depression    Diarrhea, unspecified 11/21/2016    Diarrhea in adult patient    Encounter for immunization 10/19/2016    Need for prophylactic vaccination and inoculation against influenza    Fatty (change of) liver, not elsewhere classified 02/01/2018    Fatty (change of) liver, not elsewhere classified    Hordeolum externum unspecified eye, unspecified eyelid 06/24/2015    Stye    Mild intermittent asthma with (acute) exacerbation ([facility]) 01/02/2019    Mild intermittent asthma with acute exacerbation    Nausea with vomiting, unspecified 11/16/2016    Non-intractable vomiting with nausea,  unspecified vomiting type    Nonrheumatic aortic (valve) stenosis 03/23/2018    Aortic stenosis, severe    Other chest pain 09/16/2022    Chest heaviness    Other conditions influencing health status 01/14/2019    History of cough    Other forms of angina pectoris 06/19/2020    Equivalent angina    Other specified abnormal findings of blood chemistry     Abnormal liver function test    Other specified disorders of Eustachian tube, unspecified ear 03/05/2015    Eustachian tube dysfunction    Other specified symptoms and signs involving the circulatory and respiratory systems 07/27/2017    Carotid bruit    Pain in left shoulder 01/09/2020    Chronic left shoulder pain    Pain in right leg 08/05/2020    Pain of right lower extremity    Parageusia 12/11/2019    Loss of taste    Personal history of diseases of the skin and subcutaneous tissue 10/15/2015    History of seborrheic dermatitis    Personal history of other diseases of the circulatory system 02/01/2018    History of hypertension    Personal history of other diseases of the circulatory system 06/11/2015    History of abnormal electrocardiography    Personal history of other diseases of the circulatory system 05/09/2018    History of essential hypertension    Personal history of other diseases of the digestive system     History of cholelithiasis    Personal history of other diseases of the digestive system 02/01/2018    History of hiatal hernia    Personal history of other diseases of the musculoskeletal system and connective tissue 03/23/2018    H/O arthritis    Personal history of other diseases of the nervous system and sense organs 02/11/2015    History of acute conjunctivitis    Personal history of other diseases of the nervous system and sense organs 01/23/2015    History of acute otitis media    Personal history of other diseases of the nervous system and sense organs     History of sleep apnea    Personal history of other diseases of the respiratory  system 10/15/2015    History of allergic rhinitis    Personal history of other diseases of the respiratory system 10/15/2015    History of chronic sinusitis    Personal history of other diseases of the respiratory system 04/16/2015    History of acute sinusitis    Personal history of other diseases of the respiratory system 02/01/2018    History of lung disease    Personal history of other diseases of the respiratory system 04/24/2017    History of acute pharyngitis    Personal history of other diseases of the respiratory system 02/28/2017    History of acute bronchitis    Personal history of other diseases of the respiratory system     History of bronchitis    Personal history of other diseases of urinary system     History of bladder problems    Personal history of other drug therapy     History of postmenopausal hormone replacement therapy    Personal history of other drug therapy     COVID-19 vaccine series completed    Personal history of other endocrine, nutritional and metabolic disease 08/03/2015    History of hyperlipidemia    Personal history of other endocrine, nutritional and metabolic disease 11/16/2016    History of hypokalemia    Personal history of other endocrine, nutritional and metabolic disease 02/01/2018    History of hyperlipidemia    Personal history of other mental and behavioral disorders     History of mental disorder    Personal history of other specified conditions 05/21/2015    History of fatigue    Personal history of other specified conditions 11/30/2022    History of dizziness    Personal history of other specified conditions 01/23/2014    History of urinary frequency    Personal history of other specified conditions 06/14/2021    History of insomnia    Personal history of other specified conditions     History of heartburn    Rash and other nonspecific skin eruption 02/13/2018    Rash    Rectal polyp     Rectal polyp    Right lower quadrant pain 11/16/2016    Abdominal pain, RLQ  (right lower quadrant)    Stiffness of left shoulder, not elsewhere classified 01/09/2020    Stiff shoulder, left    Unspecified asthma, uncomplicated ([facility]) 01/14/2019    Asthma in adult without complication, unspecified asthma severity, unspecified whether persistent    Unspecified conjunctivitis 06/24/2015    Conjunctivitis of left eye    Urticaria, unspecified 02/29/2016    Hives of unknown origin    Vomiting, unspecified 11/15/2016    Dry heaves     Past Surgical History:   Procedure Laterality Date    BLADDER SURGERY  02/01/2018    Bladder Surgery    CHOLECYSTECTOMY  02/01/2018    Cholecystectomy    COLONOSCOPY  03/23/2018    Complete Colonoscopy    OTHER SURGICAL HISTORY  05/09/2018    Aortic Valve Replacement Transcatheter    TONSILLECTOMY  03/23/2018    Tonsillectomy    TOTAL VAGINAL HYSTERECTOMY  03/23/2018    Vaginal Hysterectomy     Social History     Tobacco Use    Smoking status: Never    Smokeless tobacco: Never   Vaping Use    Vaping status: Never Used   Substance Use Topics    Alcohol use: Not Currently    Drug use: Never     Family History   Problem Relation Name Age of Onset    Hypertension Mother      Stroke Mother      No Known Problems Father         Lovastatin and Montelukast    Prior to Admission medications    Medication Sig Start Date End Date Taking? Authorizing Provider   albuterol 1.25 mg/3 mL nebulizer solution Take 3 mL (1.25 mg) by nebulization every 4 hours if needed for wheezing or shortness of breath (cough). Enough per box for 30 days if using 120 packets  Patient taking differently: Take 3 mL (1.25 mg) by nebulization 2 times a day. Enough per box for 30 days if using 120 packets 10/9/24 10/9/25 Yes Thea Briceno APRN-CNP   aspirin 81 mg chewable tablet Chew 1 tablet (81 mg) once daily. 9/17/24 9/17/25 Yes Sweetie Dubon APRN-CNP   atorvastatin (Lipitor) 20 mg tablet TAKE ONE TABLET BY MOUTH EVERY DAY 12/15/23  Yes Rito Tovar MD   cholecalciferol (Vitamin D-3) 125  "MCG (5000 UT) capsule Take 1 capsule (125 mcg) by mouth once daily. 1/23/14  Yes Historical Provider, MD   famotidine (Pepcid) 20 mg tablet Take 1 tablet (20 mg) by mouth once daily.   Yes Historical Provider, MD   FLUoxetine (PROzac) 20 mg capsule TAKE ONE CAPSULE BY MOUTH EVERY DAY 7/22/24  Yes Rito Tovar MD   albuterol 90 mcg/actuation inhaler Inhale 2 puffs every 6 hours if needed for wheezing. 9/17/24 9/17/25  SEBASTIAN Duffy   clobetasol (Temovate) 0.05 % cream Apply to affected areas of thickened skin on the arms twice daily 8/12/24   Risa Connolly MD   empagliflozin (Jardiance) 10 mg Take 1 tablet (10 mg) by mouth once daily.  Patient not taking: Reported on 11/19/2024 11/14/24 11/14/25  SEBASTIAN Duffy   fluticasone (Flonase Allergy Relief) 50 mcg/actuation nasal spray Administer 1 spray into each nostril once daily. Shake gently. Before first use, prime pump. After use, clean tip and replace cap.  Patient not taking: Reported on 11/19/2024 10/28/24 10/28/25  SEBASTIAN Duffy   nebulizers misc 1 Device see administration instructions. Compressor and nebulizer kit (tubing and mouth piece) 10/9/24   SEBASTIAN Ervin   acetaminophen (Tylenol) 325 mg tablet Take 2 tablets (650 mg) by mouth every 6 hours if needed. 6/26/24 11/19/24  Historical Provider, MD       Review of Systems  Review of Systems   Constitutional: Negative.    HENT: Negative.     Eyes: Negative.    Respiratory: Negative.     Cardiovascular: Negative.    Gastrointestinal: Negative.    Endocrine: Negative.    Genitourinary: Negative.    Musculoskeletal: Negative.    Neurological: Negative.            Objective   /56 (BP Location: Right arm, Patient Position: Lying) Comment: RN notified  Pulse 82   Temp 36.8 °C (98.2 °F) (Temporal)   Resp 18   Ht 1.626 m (5' 4\")   Wt 60.3 kg (132 lb 15 oz)   SpO2 95%   BMI 22.82 kg/m²   0-10 (Numeric) Pain Score: 0 - No pain   Vitals:    11/21/24 " 0424   Weight: 60.3 kg (132 lb 15 oz)          Intake/Output Summary (Last 24 hours) at 11/21/2024 1352  Last data filed at 11/21/2024 1200  Gross per 24 hour   Intake 720 ml   Output 150 ml   Net 570 ml       Physical Exam  Physical Exam  Constitutional:       General: She is not in acute distress.     Appearance: She is not ill-appearing or toxic-appearing.   HENT:      Head: Normocephalic.      Mouth/Throat:      Mouth: Mucous membranes are moist.   Cardiovascular:      Rate and Rhythm: Normal rate and regular rhythm.      Heart sounds: Murmur heard.   Pulmonary:      Effort: Pulmonary effort is normal.      Breath sounds: Normal breath sounds.   Musculoskeletal:         General: Normal range of motion.      Cervical back: Neck supple.      Right lower leg: No edema.      Left lower leg: No edema.   Skin:     General: Skin is warm and dry.   Neurological:      General: No focal deficit present.      Mental Status: She is alert and oriented to person, place, and time.   Psychiatric:         Mood and Affect: Mood normal.         Behavior: Behavior normal.         Medications  Scheduled medications  aspirin, 81 mg, oral, Daily  atorvastatin, 20 mg, oral, Nightly  cholecalciferol, 5,000 Units, oral, Daily  famotidine, 20 mg, oral, Daily  FLUoxetine, 20 mg, oral, Daily  fluticasone, 1 spray, Each Nostril, Daily  losartan, 25 mg, oral, Daily  polyethylene glycol, 17 g, oral, Daily    Continuous medications   PRN medications  PRN medications: acetaminophen, albuterol    Labs  Results for orders placed or performed during the hospital encounter of 11/19/24 (from the past 24 hours)   CBC   Result Value Ref Range    WBC 6.3 4.4 - 11.3 x10*3/uL    nRBC 0.0 0.0 - 0.0 /100 WBCs    RBC 3.59 (L) 4.00 - 5.20 x10*6/uL    Hemoglobin 10.2 (L) 12.0 - 16.0 g/dL    Hematocrit 32.2 (L) 36.0 - 46.0 %    MCV 90 80 - 100 fL    MCH 28.4 26.0 - 34.0 pg    MCHC 31.7 (L) 32.0 - 36.0 g/dL    RDW 13.7 11.5 - 14.5 %    Platelets 169 150 - 450  x10*3/uL   Renal Function Panel   Result Value Ref Range    Glucose 86 74 - 99 mg/dL    Sodium 137 136 - 145 mmol/L    Potassium 3.7 3.5 - 5.3 mmol/L    Chloride 104 98 - 107 mmol/L    Bicarbonate 25 21 - 32 mmol/L    Anion Gap 12 10 - 20 mmol/L    Urea Nitrogen 16 6 - 23 mg/dL    Creatinine 0.85 0.50 - 1.05 mg/dL    eGFR 67 >60 mL/min/1.73m*2    Calcium 8.5 (L) 8.6 - 10.6 mg/dL    Phosphorus 4.3 2.5 - 4.9 mg/dL    Albumin 3.7 3.4 - 5.0 g/dL   Magnesium   Result Value Ref Range    Magnesium 1.83 1.60 - 2.40 mg/dL     *Note: Due to a large number of results and/or encounters for the requested time period, some results have not been displayed. A complete set of results can be found in Results Review.               ASSESSMENT & PLAN  Aure Hdez is a 84 y.o. female with a history significant for CAD, LBBB, aortic stenosis s/p TAVR 03/16/2018, HTN, diastolic heart failure, mitral regurgitation, GERD, depression, SDH 6/2024, and syncope. The patient underwent TAVR on 03/16/2018 and has been followed by Dr. Preciado and SEBASTIAN Duffy. She states that she has been admitted to hospital on four separate occasions over the past several months. Patient admits to 3 syncopal episodes in the past year, the most recent in June which resulted in a hospitalization and subdural hematoma which later decreased in size on follow-up. She was then admitted to [facility] in early September with COVID pneumonia. The patient was readmitted to hospital later that month with acute on chronic diastolic heart failure.      Echocardiogram on 10/28/2024 demonstrated an EF of 56%, moderate mitral regurgitation, moderate aortic regurgitation and dilatation of the aortic root and a mobile echodensity in the LVOT. Patient saw Dr. Greenfield on 11/18 who directed patient to be admitted for urgent evaluation of aortic insufficiency.     She states she previously had orthopnea and leg swelling but in the last 2 weeks it has improved after  diuretic treatment. She occasionally has dizziness and syncope, as described above. She is independent in ADLs and IADLs.     Cardiac surgery and structural heart both consulted to determine the best treatment option for the patient's AI.       RECOMMENDATIONS/PLAN  Dr. Kovacs aware of patient, currently reviewing case and available imaging.   - Cardiac surgery and structural heart both consulted to determine the best treatment option for the patient's AI. Once workup is obtained, both teams will discuss and decide what is the best intervention for the patient.    - SEN pending  - CT TAVR completed 10/20  - Medical optimization per primary team  - [facility] 11/21 pending   - Blood cultures drawn 11/19 to r/o endocarditis. Negative to date.       Will continue to follow along.  Thank you for the consultation.   Patient educated and all questions answered.  Please page the cardiac surgery consult pager [phone] with any questions or changes in patient condition.    Autumn Segovia PA-C  Cardiac Surgery Consult [facility]  [facility]  Cardiac Surgery Consult Pager [phone]     11/21/2024  1:52 PM

## 2024-11-21 NOTE — CARE PLAN
The patient's goals for the shift include  NPO in preparation for SEN    The clinical goals for the shift include Pt will deny SOB    Patient had an uneventful night. NPO since 0000 in preparation for SEN today.    Over the shift, the patient made progress toward the following goals.       Problem: Safety - Adult  Goal: Free from fall injury  Outcome: Progressing     Problem: Discharge Planning  Goal: Discharge to home or other facility with appropriate resources  Outcome: Progressing     Problem: Pain - Adult  Goal: Verbalizes/displays adequate comfort level or baseline comfort level  Outcome: Progressing     Problem: Chronic Conditions and Co-morbidities  Goal: Patient's chronic conditions and co-morbidity symptoms are monitored and maintained or improved  Outcome: Progressing

## 2024-11-21 NOTE — INTERVAL H&P NOTE
H&P reviewed. The patient was examined and there are no changes to the H&P. Plan for [facility] today as part of pre-TAVR evaluation. Primary team is investigating her ability to receive heparin in the setting of subdural hematoma earlier this year.

## 2024-11-21 NOTE — CONSULTS
Inpatient consult to Structural Heart/TAVR  Consult performed by: Hayden Woodward MD  Consult ordered by: SEBASTIAN Lewis, RITCHIE        History Of Present Illness:    Aure Hdez is a 84 y.o. female, accompanied by her  and daughter, presents with worsening exertional shortness of breath that started earlier this year. Her PMH is significant for CAD, LBBB, aortic stenosis s/p TAVR 03/16/2018, HTN, diastolic heart failure, mitral regurgitation, GERD, depression, SDH 6/2024, and syncope. The patient underwent TAVR on 03/16/2018 and has been followed by Dr. Preciado and Sweetie Dubon, LAURA-CNP. She states that she has been admitted to hospital on four separate occasions over the past several months. Patient admits to 3 syncopal episodes in the past year, the most recent in June which resulted in a hospitalization and subdural hematoma which later decreased in size on follow-up. She was then admitted to [facility] in early September with COVID pneumonia. The patient was readmitted to hospital later that month with acute on chronic diastolic heart failure.    Echocardiogram on 10/28/2024 demonstrated an EF of 56%, moderate mitral regurgitation, moderate aortic regurgitation and dilatation of the aortic root.   Patient saw Dr. Greenfield on 11/18 who directed patient to be admitted for urgent evaluation of aortic insufficiency.   On admission, patient denies chest pain, fevers, chills, n/v/d but endorses worsening fatigue, SOB and CATHERINE since this summer. She previously had orthopnea and leg swelling but in the last 2 weeks it has improved after diuretic treatment. She occasionally has dizziness and syncope, as described above. She is independent in ADLs and IADLs. Patient appears euvolemic on exam, is warm and well compensated, in NAD.   Structural consulted for Ruth-TAVR.        Past Medical History:  She has a past medical history of Acute upper respiratory infection, unspecified (01/02/2019), Anosmia  (12/11/2019), Body mass index (BMI) 26.0-26.9, adult (11/30/2022), Body mass index (BMI) 27.0-27.9, adult (11/02/2022), Body mass index (BMI)30.0-30.9, adult (06/18/2020), Cellulitis of external ear, unspecified ear (06/30/2014), Cellulitis of face (06/24/2015), Cervicalgia (07/21/2021), Depression, unspecified (01/03/2022), Diarrhea, unspecified (11/21/2016), Encounter for immunization (10/19/2016), Fatty (change of) liver, not elsewhere classified (02/01/2018), Hordeolum externum unspecified eye, unspecified eyelid (06/24/2015), Mild intermittent asthma with (acute) exacerbation ([facility]) (01/02/2019), Nausea with vomiting, unspecified (11/16/2016), Nonrheumatic aortic (valve) stenosis (03/23/2018), Other chest pain (09/16/2022), Other conditions influencing health status (01/14/2019), Other forms of angina pectoris (06/19/2020), Other specified abnormal findings of blood chemistry, Other specified disorders of Eustachian tube, unspecified ear (03/05/2015), Other specified symptoms and signs involving the circulatory and respiratory systems (07/27/2017), Pain in left shoulder (01/09/2020), Pain in right leg (08/05/2020), Parageusia (12/11/2019), Personal history of diseases of the skin and subcutaneous tissue (10/15/2015), Personal history of other diseases of the circulatory system (02/01/2018), Personal history of other diseases of the circulatory system (06/11/2015), Personal history of other diseases of the circulatory system (05/09/2018), Personal history of other diseases of the digestive system, Personal history of other diseases of the digestive system (02/01/2018), Personal history of other diseases of the musculoskeletal system and connective tissue (03/23/2018), Personal history of other diseases of the nervous system and sense organs (02/11/2015), Personal history of other diseases of the nervous system and sense organs (01/23/2015), Personal history of other diseases of the nervous system and sense  organs, Personal history of other diseases of the respiratory system (10/15/2015), Personal history of other diseases of the respiratory system (10/15/2015), Personal history of other diseases of the respiratory system (04/16/2015), Personal history of other diseases of the respiratory system (02/01/2018), Personal history of other diseases of the respiratory system (04/24/2017), Personal history of other diseases of the respiratory system (02/28/2017), Personal history of other diseases of the respiratory system, Personal history of other diseases of urinary system, Personal history of other drug therapy, Personal history of other drug therapy, Personal history of other endocrine, nutritional and metabolic disease (08/03/2015), Personal history of other endocrine, nutritional and metabolic disease (11/16/2016), Personal history of other endocrine, nutritional and metabolic disease (02/01/2018), Personal history of other mental and behavioral disorders, Personal history of other specified conditions (05/21/2015), Personal history of other specified conditions (11/30/2022), Personal history of other specified conditions (01/23/2014), Personal history of other specified conditions (06/14/2021), Personal history of other specified conditions, Rash and other nonspecific skin eruption (02/13/2018), Rectal polyp, Right lower quadrant pain (11/16/2016), Stiffness of left shoulder, not elsewhere classified (01/09/2020), Unspecified asthma, uncomplicated ([facility]) (01/14/2019), Unspecified conjunctivitis (06/24/2015), Urticaria, unspecified (02/29/2016), and Vomiting, unspecified (11/15/2016).    Past Surgical History:  She has a past surgical history that includes Bladder surgery (02/01/2018); Colonoscopy (03/23/2018); Cholecystectomy (02/01/2018); Total vaginal hysterectomy (03/23/2018); Tonsillectomy (03/23/2018); and Other surgical history (05/09/2018).      Social History:  She reports that she has never smoked. She has  never used smokeless tobacco. She reports that she does not currently use alcohol. She reports that she does not use drugs.     Allergies:  Lovastatin and Montelukast    Outpatient Medications:  Current Outpatient Medications   Medication Instructions    albuterol 90 mcg/actuation inhaler 2 puffs, inhalation, Every 6 hours PRN    albuterol 1.25 mg, nebulization, Every 4 hours PRN, Enough per box for 30 days if using 120 packets    aspirin 81 mg, oral, Daily    atorvastatin (Lipitor) 20 mg tablet TAKE ONE TABLET BY MOUTH EVERY DAY    cholecalciferol (Vitamin D-3) 125 MCG (5000 UT) capsule 1 capsule, Daily    clobetasol (Temovate) 0.05 % cream Apply to affected areas of thickened skin on the arms twice daily    empagliflozin (JARDIANCE) 10 mg, oral, Daily    famotidine (PEPCID) 20 mg, Daily    FLUoxetine (PROzac) 20 mg capsule TAKE ONE CAPSULE BY MOUTH EVERY DAY    fluticasone (Flonase Allergy Relief) 50 mcg/actuation nasal spray 1 spray, Each Nostril, Daily, Shake gently. Before first use, prime pump. After use, clean tip and replace cap.    nebulizers misc 1 Device, miscellaneous, See admin instructions, Compressor and nebulizer kit (tubing and mouth piece)       TAVR Workup:   - NYHA: III  - Frailty: 2/5   - EKG: NSR,  ms, QRS 80 ms.  - TTE 10/28: EF 56%, s/p TAVR with Evolut 26, severe aortic stenosis with AV gradients 80/41, Vmax 4.48, DI 0.3, moderate valvular regurgitation with P1/2T 158msec, Moderate MR. ASD with left to right shunt. Svevere pulmonary HTN w/ SPAP of 94mmHg.  Mobile echodensity in LVOT.  -SEN: did not tolerate probe down her throat.   - CT TAVR: done.   - Blood Cx X2 Pending  - LHC: pending  - dental clearance: regular dentist visits q6 months, no issues.  - STS    Procedure Type: Isolated AVR  Perioperative Outcome Estimate %  Operative Mortality 6.52%  Morbidity & Mortality 12.7%  Stroke 4.41%  Renal Failure 3.08%  Reoperation 5.46%  Prolonged Ventilation 7.64%  Deep Sternal Wound  Infection 0.075%  Long Hospital Stay (>14 days) 7.25%  Short Hospital Stay (<6 days)* 20.1%           K[facility]Q Questionnaire      1  Heart failure affects different people in different ways. Some feel shortness of breath while others feel fatigue. Please indicate how much you are limited by heart failure (shortness of breath or fatigue) in your ability to do the following activities over the past 2 weeks. 1 month Structural Heart NP follow-up     A.) Showering/bathing  5. Not at All  B.) Walking 1 block on level ground 1. Extremely  C.) Hurrying or Jogging   1. Extremely    2.  Over the past 2 weeks, how many times did you have swelling in your feet, ankles or legs when you woke up in the morning? 5. Never    3.  Over the past 2 weeks, on average, how many times has fatigue limited your ability to do what you wanted? 4. 3 or more times a week but not every day    4.  Over the past 2 weeks, on average, how many times has shortness of breath limited your ability to do what you wanted? 4. 3 or more times a week but not every day    5.  Over the past 2 weeks, on average, how many times have you been forced to sleep sitting up in a chair or with at least 3 pillows to prop you up because of shortness of breath? Never    6. Over the past 2 weeks, how much has your heart failure limited your enjoyment of life? It has limited my enjoyment of life quite a bit    7. If you had to spend the rest of your life with your heart failure the way it is right now, how would you feel about this? 1. Not at all     8. How much does your heart failure affect your lifestyle? Please indicate how your heart failure may have limited yourparticipation in the following activities over the past 2 weeks    A.)  Hobbies, recreational activities  2. Limited quite a bit    B.) Working or doing household chores  2. Limited quite a bit    C.) Visiting family or friends out of your home  2. Limited quite a bit        Physical Exam:  Constitutional: alert  and in no acute distress.   Eyes: no erythema, swelling or discharge from the eye .   ENT: no erythema, edema, exudate or lesions .   Neck: neck is supple, no JVD  Pulmonary: no increased work of breathing or signs of respiratory distress , lungs clear to auscultation. , normal percussion of chest  and chest palpation normal .   Cardiovascular: RRR, 3/6 SHELTON RUSB,  mild pitting leg edema, non-displaced PMI, no S3 or S4  Abdomen: abdomen non-tender, no masses  and no hepatomegaly .   Neurologic: non-focal neurologic examination.       Last Recorded Vitals:  Vitals:    11/20/24 1438 11/20/24 1446 11/20/24 1532 11/20/24 1943   BP: (!) 145/40 (!) 152/39 153/61 148/53   BP Location:   Left arm Left arm   Patient Position:   Lying Lying   Pulse: 74 76 75 85   Resp: 20 18 17 18   Temp:   36.7 °C (98.1 °F) 36.4 °C (97.5 °F)   TempSrc:   Temporal    SpO2: 93% 90% 96% 92%   Weight:       Height:           Last Labs:  CBC - 11/20/2024: 11:55 AM  7.1 11.1 179    34.3      BMP  139  106  14                  ----------------<86     4.3  27  0.96     CMP - 11/20/2024: 11:55 AM  9.1 7.2 30 --- 0.8   3.9 4.1 13 54      PTT - No results in last year.  _   _ _     Troponin I, High Sensitivity ([facility])   Date/Time Value Ref Range Status   11/19/2024 12:37  (HH) 0 - 34 ng/L Final     Comment:     Previous result verified on 11/19/2024 1229 on specimen/case [id number] called with component [facility] for procedure Troponin I, High Sensitivity, Initial with value 188 ng/L.     BNP   Date/Time Value Ref Range Status   11/19/2024 11:21  (H) 0 - 99 pg/mL Final        Last I/O:  I/O last 3 completed shifts:  In: 480 (7.8 mL/kg) [P.O.:480]  Out: - (0 mL/kg)   Weight: 61.2 kg     Ejection Fractions:  EF   Date/Time Value Ref Range Status   10/28/2024 11:19 AM 56 %        Inpatient Medications:  Scheduled medications   Medication Dose Route Frequency    aspirin  81 mg oral Daily    atorvastatin  20 mg oral Nightly    cholecalciferol   5,000 Units oral Daily    famotidine  20 mg oral Daily    FLUoxetine  20 mg oral Daily    fluticasone  1 spray Each Nostril Daily    polyethylene glycol  17 g oral Daily     PRN medications   Medication    acetaminophen    albuterol     Continuous Medications   Medication Dose Last Rate       Impression:  Aure Hdez is a 84 y.o. female, with PMH significant for CAD, GERD, depression,  HTN, diastolic heart failure, SDH 6/2024,  s/p TAVR 03/16/2018 with Evolut 26 by Dr. Preciado now presenting with severe symptomatic degeneration of aortic prosthesis with mixed severe aortic stenosis and moderate valvular AR, moderate MR and severe PHTN. In addition, patient has mobile echodensity in LVOT on TTE but no other signs of endocarditis.     Plan:  Needs [facility]  Will follow-up BCx to rule out endocarditis and will consider PET CT since SEN cannot be performed.  If no endocarditis, will likely proceed to Ruth-TAVR  Needs CTS consult    We will discuss this patient's case at our Valve Team meeting with representatives from Structural Heart and Cardiac Surgery on Monday. Our nurse navigators will contact patient with further diagnostic needs and formal plan.    Hayden Woodward MD

## 2024-11-21 NOTE — HOSPITAL COURSE
Aure Hdez is a 84 y.o. female, accompanied by her  and daughter, presents with worsening exertional shortness of breath that started earlier this year.     Her PMH is significant for CAD, LBBB, aortic stenosis s/p TAVR 03/16/2018, HTN, diastolic heart failure, mitral regurgitation, GERD, depression, SDH 6/2024, and syncope. The patient underwent TAVR on 03/16/2018 and has been followed by Dr. Preciado and LAURA Duffy-CNP.     She states that she has been admitted to hospital on four separate occasions over the past several months. Patient admits to 3 syncopal episodes in the past year, the most recent in June which resulted in a hospitalization and subdural hematoma.     At the time ASA was reversed with DDAVP and patient was monitored in the ICU, and later discharged. Patient advised to hold ASA and follow up for serial head CTs which showed no evidence of acute hemorrhage and an interval decrease in the density of the small left frontal subdural hemorrhage on 8/14/24. She was then admitted to [facility] in early September with COVID pneumonia.     The patient was readmitted to hospital later that month with acute on chronic diastolic heart failure. On 09/11/2024, CXR revealed small bilateral pleural effusions. Echocardiogram on 10/28/2024 demonstrated an EF of 56%, restrictive pattern of left ventricular diastolic filling, normal RV systolic function, a large pleural effusion, moderate mitral regurgitation, moderate aortic regurgitation, severely elevated pulmonary artery pressure, atrial septal defect and dilatation of the aortic root. Repeat CXR on 11/12/2024 revealed mild interstitial congestion with effusions and basilar atelectasis.     Patient saw Dr. Greenfield on 11/18 who directed patient to be admitted for urgent evaluation of aortic insufficiency. On physical exam,patient denies chest pain, n/v/d. Patient appears euvolemic on exam, is warm and well compensated, in NAD. We will admit to  [facility] for structural heart work up.     Floor course:     consulted for aortic insufficiency/stenosis. [facility] completed as part of Ruth TAVR work-up which showed no significant CAD. Per  and Dr. Preciado, low concern for endocarditis at this time and did not recommend SEN imaging.  [facility] completed-> no significant CAD. Per , Ruth to be completed outpatient. BC X2 showed no growth at day 3, will trend till completion. Will be discussed at valve meeting and called with plan per structural. Neurosurgery consulted for heparin product recommendations iso recent SDH 6/2024. CTH completed 11/22 showed no subdural hematoma. Neurosurg cleared patient for anticoagulation.   Started on losartan 50mg daily, SB improve from 150s to 120s, feeling well.     Discharge weight: 60.3 kg    After all labs and VS were reviewed the decision was made that the patient was medically stable for discharge.  The patient was discharged in satisfactory condition.    More than 60 minutes were spent in coordinating patient discharge.

## 2024-11-21 NOTE — PROGRESS NOTES
Subjective   Patient seen this AM, stated she felt like her heart was racing during interview but tele rate maintained 80 BPM. Denies SOB at rest this AM. TTE unable to be completed yesterday (inability to pass probe). SEN unable to be completed today due to anesthesia scheduling.     Per , patient requires LHC for work-up. Due to recent (6/2024) SDH, neurosurgery consulted for recommendations regarding heparin products/ anticoagulation with LHC and potential Ruth TAVR.  LHC deferred today pending neurosurgery input, appreciate neurosurgery recommendations.    Today in Brief:   - CTS consulted per  protocol for Ruth TAVR w/u  - SEN unable to be completed today due to anesthesia scheduling-> NPO at [facility] for SEN tomorrow  - LHC pending per  work-up  - Due to recent (6/2024) SDH, neurosurgery consulted for recommendations regarding heparin products/ anticoagulation with LHC and potential Ruth TAVR.  LHC deferred today pending neurosurgery input, appreciate neurosurgery recommendations.  - SBPs elevated 130-160s-> losartan 25 mg started     Objective     Physical Exam  Constitutional:       General: She is not in acute distress.     Appearance: Normal appearance.   HENT:      Head: Normocephalic and atraumatic.      Mouth/Throat:      Mouth: Mucous membranes are moist.   Eyes:      Extraocular Movements: Extraocular movements intact.      Pupils: Pupils are equal, round, and reactive to light.   Cardiovascular:      Rate and Rhythm: Normal rate and regular rhythm.      Heart sounds: Murmur heard.      Systolic murmur is present with a grade of 4/6.   Pulmonary:      Effort: Pulmonary effort is normal. No respiratory distress.      Breath sounds: Normal breath sounds.   Abdominal:      General: Abdomen is flat.   Musculoskeletal:      Cervical back: Normal range of motion.      Right lower leg: No edema.      Left lower leg: No edema.   Skin:     General: Skin is warm and dry.   Neurological:      General: No focal  "deficit present.      Mental Status: She is alert and oriented to person, place, and time.   Psychiatric:         Mood and Affect: Mood normal.         Behavior: Behavior normal.       Last Recorded Vitals  Blood pressure 168/74, pulse 83, temperature 36.5 °C (97.7 °F), temperature source Temporal, resp. rate 19, height 1.626 m (5' 4\"), weight 60.3 kg (132 lb 15 oz), SpO2 93%.  Intake/Output last 3 Shifts:  I/O last 3 completed shifts:  In: 480 (8 mL/kg) [P.O.:480]  Out: 150 (2.5 mL/kg) [Urine:150 (0.1 mL/kg/hr)]  Weight: 60.3 kg     Relevant Results  Scheduled medications  aspirin, 81 mg, oral, Daily  atorvastatin, 20 mg, oral, Nightly  cholecalciferol, 5,000 Units, oral, Daily  famotidine, 20 mg, oral, Daily  FLUoxetine, 20 mg, oral, Daily  fluticasone, 1 spray, Each Nostril, Daily  magnesium oxide, 400 mg, oral, Once  polyethylene glycol, 17 g, oral, Daily  potassium chloride CR, 20 mEq, oral, Once      Continuous medications     PRN medications  PRN medications: acetaminophen, albuterol  Results for orders placed or performed during the hospital encounter of 11/19/24 (from the past 24 hours)   CBC   Result Value Ref Range    WBC 7.1 4.4 - 11.3 x10*3/uL    nRBC 0.0 0.0 - 0.0 /100 WBCs    RBC 3.90 (L) 4.00 - 5.20 x10*6/uL    Hemoglobin 11.1 (L) 12.0 - 16.0 g/dL    Hematocrit 34.3 (L) 36.0 - 46.0 %    MCV 88 80 - 100 fL    MCH 28.5 26.0 - 34.0 pg    MCHC 32.4 32.0 - 36.0 g/dL    RDW 13.8 11.5 - 14.5 %    Platelets 179 150 - 450 x10*3/uL   Renal Function Panel   Result Value Ref Range    Glucose 86 74 - 99 mg/dL    Sodium 139 136 - 145 mmol/L    Potassium 4.3 3.5 - 5.3 mmol/L    Chloride 106 98 - 107 mmol/L    Bicarbonate 27 21 - 32 mmol/L    Anion Gap 10 10 - 20 mmol/L    Urea Nitrogen 14 6 - 23 mg/dL    Creatinine 0.96 0.50 - 1.05 mg/dL    eGFR 58 (L) >60 mL/min/1.73m*2    Calcium 9.1 8.6 - 10.6 mg/dL    Phosphorus 3.9 2.5 - 4.9 mg/dL    Albumin 4.1 3.4 - 5.0 g/dL   Magnesium   Result Value Ref Range    Magnesium " 1.99 1.60 - 2.40 mg/dL   CBC   Result Value Ref Range    WBC 6.3 4.4 - 11.3 x10*3/uL    nRBC 0.0 0.0 - 0.0 /100 WBCs    RBC 3.59 (L) 4.00 - 5.20 x10*6/uL    Hemoglobin 10.2 (L) 12.0 - 16.0 g/dL    Hematocrit 32.2 (L) 36.0 - 46.0 %    MCV 90 80 - 100 fL    MCH 28.4 26.0 - 34.0 pg    MCHC 31.7 (L) 32.0 - 36.0 g/dL    RDW 13.7 11.5 - 14.5 %    Platelets 169 150 - 450 x10*3/uL   Renal Function Panel   Result Value Ref Range    Glucose 86 74 - 99 mg/dL    Sodium 137 136 - 145 mmol/L    Potassium 3.7 3.5 - 5.3 mmol/L    Chloride 104 98 - 107 mmol/L    Bicarbonate 25 21 - 32 mmol/L    Anion Gap 12 10 - 20 mmol/L    Urea Nitrogen 16 6 - 23 mg/dL    Creatinine 0.85 0.50 - 1.05 mg/dL    eGFR 67 >60 mL/min/1.73m*2    Calcium 8.5 (L) 8.6 - 10.6 mg/dL    Phosphorus 4.3 2.5 - 4.9 mg/dL    Albumin 3.7 3.4 - 5.0 g/dL   Magnesium   Result Value Ref Range    Magnesium 1.83 1.60 - 2.40 mg/dL     Last Cardiology Tests:  11/12/2024 - CXR  Mild interstitial congestion with effusions and basilar atelectasis.     10/28/2024 - TTE  1. The left ventricular systolic function is normal, with a Lee's biplane calculated ejection fraction of 56%.  2. Spectral Doppler shows a restrictive pattern of left ventricular diastolic filling.  3. There is normal right ventricular global systolic function.  4. The left atrium is moderately dilated.  5. There is a large pleural effusion.  6. The mitral valve is moderately thickened.  7. There is moderate mitral annular calcification.  8. Moderate mitral valve regurgitation.  9. There is a Medtronic transcatheter aortic valve replacement, with a 26mm reported size.  10. Moderate aortic valve regurgitation.  11. Severely elevated pulmonary artery pressure.  12. There is a moderately sized atrial septal defect.  13. There is moderate dilatation of the aortic root.     09/11/2024 - CTA Chest for PE  1.  Mildly limited study due to motion artifact. No obvious pulmonary emboli.   2. Moderate bilateral pleural  effusion with adjacent compressive atelectasis. Additional scattered small regions of atelectasis.   3.  Scattered small and slightly enlarged mediastinal nodes.      09/11/2024 - CXR  Increasing pleural effusions.      04/19/2023 - TTE  1. Left ventricular systolic function is normal with a 65% estimated ejection fraction.  2. Spectral Doppler shows a pseudonormal pattern of left ventricular diastolic filling.  3. RVSP within normal limits.  4. Aortic valve appears abnormal.  5. There is a transcatheter aortic valve replacement.  6. Left ventricular cavity size is decreased.  7. AV gradient and estimated aortic valve area has changed significantly since prior exam.  8. Today's exam: mean gradient of 33 mmHg, peak gradient 50 mmHg, DI .31, JONNY .9 cm2.  9. Prior exam 4/6/22: Mean gradient 9.3 mm Hg, peak gradient 17 mmHg, DI .49, There is now a small pedunculated echo density seen in LVOT, has been seen previously, suspect relates to valve degeneration, has not changed in appearance from prior exam.    03/16/2018 - TAVR  1. Transcatheter aortic valve replacement with successful implantation of EvoluteR Pro 26 mm valve.  2. Patient was enrolled in a research study and data was included in the TVT Registry.            Assessment/Plan   Aure Hdez is an 84 year old female with a PMH of CAD, LBBB, aortic stenosis s/p TAVR 03/16/2018, HTN, diastolic heart failure, mitral regurgitation, GERD, depression, SDH, and syncope. The patient underwent TAVR on 03/16/2018 and has been followed by Dr. Perciado and Sweetie Dubon, LAURA-CNP. Patient has been experiencing worsening shortness of breath which is what brought her in today.  She states that she has been admitted to hospital on four separate occasions over the past several months, noting that prior to 1 admission she had had a syncopal episode. Patient is admitted to [facility] for structural heart evaluation.      Aortic insufficiency   Hx of Aortic Stenosis s/p TAVR 03/16/2018    - TTE 10/28 EF 56% Aortic Valve: There is a prosthetic aortic valve present. The aortic valve dimensionless index is 0.30. There is a Medtronic transcatheter aortic valve replacement, with a 26 mm reported size. There is moderate lalo-prosthetic aortic valve regurgitation. There is moderate aortic valve regurgitation. The peak instantaneous gradient of the aortic valve is 80.2 mmHg. The mean gradient of the aortic valve is 41.7 mmHg. In comparison to the previous echocardiogram(s): Compared with study dated 4/19/2023, Degree of AI has increased, trans-AVR gradients are severely increased, RVSP is now severely increased. MR has increased. Mobile echodenisty is again seen in the LVOT - suggest SEN.   - Consulted structural heart for further evaluation, following  - for  work-up: SEN pending, CTS consulted, LHC pending (see SHD below), CT TAVR completed, BC X2 NGTD X1 day to rule out endocarditis   - SEN attempted 11/20 but unable to pass probe-> pending SEN tomorrow with anesthesia   - Continue home ASA 81 mg daily, atorvastatin 20 mg daily      Chronic Diastolic Heart Failure   - TTE 10/28 as above   - BNP on admit 596 (1151 1 month prior)  - warm and dry on exam, no edema, no JVD  - admit CXR: Redemonstrated mild bilateral interstitial edema with small bilateral pleural effusions and bibasilar atelectasis.  - Diurese PRN  - o/p cards had considered Jardiance, but was not started  - had taken lasix daily until she ran out, did not get refilled    HTN  - SBP 130s-160s  - losartan 25 mg started today for elevated BPs    Left frontal subdural hemorrhage, 6/24/24  - Admitted to [facility] for syncopal fall with a head injury  - follows with neurology at [facility]  - Initially ASA was held after discharge until 8/21/24 after serial CTs showed no evidence of acute hemorrhage. There was note of an interval decrease in the density of the small left frontal subdural hemorrhage.   - Was cleared for ASA 81mg  daily on 8/21  - Per , patient requires LHC for work-up. Due to recent SDH, neurosurgery consulted for recommendations regarding heparin products/ anticoagulation with LHC and potential Ruth TAVR.   - LHC deferred today pending neurosurgery input, appreciate neurosurgery recommendations.     Depression  - continue home fluoxetine 20mg daily      Dispo: Pending further evaluation with SEN, LHC   DVT ppx: mechanical ppx, pending neurosurg input  Code Status: Full Code, confirmed upon admission     Seen and discussed with Dr. Mcdonnell.     Bina Laurent, APRN-CNP

## 2024-11-21 NOTE — CONSULTS
Date of Service:  11/21/2024 Attending Provider:  Navid Mcdonnell MD     Reason for Consultation:  Aure Hdez is being seen today for a consult requested by Navid Mcdonnell MD for heparin clearance in the setting of recent SDH in 6/2024.      Subjective   History of Present Illness:  Aure is a 84 y.o. female with hx of HTN, GERD, diastolic HF, mitral regurgitation, CAD, LBBB, aortic stenosis s/p TAVR 3/2018, and SDH 6/2024 presenting for a second opinion on her persistent SOB; NSGY consulted for heparin clearance. Her SDH was found after a fall when she was at a restaurant with family. No other injuries were sustained and no acute surgical interventions were needed. She has had no residual neurological deficits from the SDH.       Review of Systems   10 point ROS is obtained and negative except the ones mentioned in the HPI    Objective     Vitals:  Vitals:    11/21/24 1607   BP: 152/55   Pulse: 82   Resp: 18   Temp: 36.7 °C (98.1 °F)   SpO2: 96%         Exam:  Constitutional: No acute distress  Resp: breathing comfortably  Cardio: well perfused  GI: nondistended  MSK: full range of motion  Neuro: No acute distress, A&Ox3  Cranial Nerves II-XII grossly intact bilaterally  Motor:   RUE D5 / B5 / T5 / HG 5/ IO 5  LUE D5 / B5 / T5 / HG 5/ IO 5  RLE HF5 / KE 5/ DF 5/ PF 5  LLE HF5 / KE 5/ DF 5/ PF 5  no pronator drift  Sensation: SILT throughout all extremities  Psych: appropriate  Skin: no obvious lesions      Medical History  Past Medical History:   Diagnosis Date    Acute upper respiratory infection, unspecified 01/02/2019    Acute URI    Anosmia 12/11/2019    Loss of smell    Body mass index (BMI) 26.0-26.9, adult 11/30/2022    BMI 26.0-26.9,adult    Body mass index (BMI) 27.0-27.9, adult 11/02/2022    BMI 27.0-27.9,adult    Body mass index (BMI)30.0-30.9, adult 06/18/2020    BMI 30.0-30.9,adult    Cellulitis of external ear, unspecified ear 06/30/2014    Cellulitis of earlobe    Cellulitis of face 06/24/2015     Cellulitis, face    Cervicalgia 07/21/2021    Neck pain on right side    Depression, unspecified 01/03/2022    Controlled depression    Diarrhea, unspecified 11/21/2016    Diarrhea in adult patient    Encounter for immunization 10/19/2016    Need for prophylactic vaccination and inoculation against influenza    Fatty (change of) liver, not elsewhere classified 02/01/2018    Fatty (change of) liver, not elsewhere classified    Hordeolum externum unspecified eye, unspecified eyelid 06/24/2015    Stye    Mild intermittent asthma with (acute) exacerbation ([facility]) 01/02/2019    Mild intermittent asthma with acute exacerbation    Nausea with vomiting, unspecified 11/16/2016    Non-intractable vomiting with nausea, unspecified vomiting type    Nonrheumatic aortic (valve) stenosis 03/23/2018    Aortic stenosis, severe    Other chest pain 09/16/2022    Chest heaviness    Other conditions influencing health status 01/14/2019    History of cough    Other forms of angina pectoris 06/19/2020    Equivalent angina    Other specified abnormal findings of blood chemistry     Abnormal liver function test    Other specified disorders of Eustachian tube, unspecified ear 03/05/2015    Eustachian tube dysfunction    Other specified symptoms and signs involving the circulatory and respiratory systems 07/27/2017    Carotid bruit    Pain in left shoulder 01/09/2020    Chronic left shoulder pain    Pain in right leg 08/05/2020    Pain of right lower extremity    Parageusia 12/11/2019    Loss of taste    Personal history of diseases of the skin and subcutaneous tissue 10/15/2015    History of seborrheic dermatitis    Personal history of other diseases of the circulatory system 02/01/2018    History of hypertension    Personal history of other diseases of the circulatory system 06/11/2015    History of abnormal electrocardiography    Personal history of other diseases of the circulatory system 05/09/2018    History of essential hypertension     Personal history of other diseases of the digestive system     History of cholelithiasis    Personal history of other diseases of the digestive system 02/01/2018    History of hiatal hernia    Personal history of other diseases of the musculoskeletal system and connective tissue 03/23/2018    H/O arthritis    Personal history of other diseases of the nervous system and sense organs 02/11/2015    History of acute conjunctivitis    Personal history of other diseases of the nervous system and sense organs 01/23/2015    History of acute otitis media    Personal history of other diseases of the nervous system and sense organs     History of sleep apnea    Personal history of other diseases of the respiratory system 10/15/2015    History of allergic rhinitis    Personal history of other diseases of the respiratory system 10/15/2015    History of chronic sinusitis    Personal history of other diseases of the respiratory system 04/16/2015    History of acute sinusitis    Personal history of other diseases of the respiratory system 02/01/2018    History of lung disease    Personal history of other diseases of the respiratory system 04/24/2017    History of acute pharyngitis    Personal history of other diseases of the respiratory system 02/28/2017    History of acute bronchitis    Personal history of other diseases of the respiratory system     History of bronchitis    Personal history of other diseases of urinary system     History of bladder problems    Personal history of other drug therapy     History of postmenopausal hormone replacement therapy    Personal history of other drug therapy     COVID-19 vaccine series completed    Personal history of other endocrine, nutritional and metabolic disease 08/03/2015    History of hyperlipidemia    Personal history of other endocrine, nutritional and metabolic disease 11/16/2016    History of hypokalemia    Personal history of other endocrine, nutritional and metabolic disease  02/01/2018    History of hyperlipidemia    Personal history of other mental and behavioral disorders     History of mental disorder    Personal history of other specified conditions 05/21/2015    History of fatigue    Personal history of other specified conditions 11/30/2022    History of dizziness    Personal history of other specified conditions 01/23/2014    History of urinary frequency    Personal history of other specified conditions 06/14/2021    History of insomnia    Personal history of other specified conditions     History of heartburn    Rash and other nonspecific skin eruption 02/13/2018    Rash    Rectal polyp     Rectal polyp    Right lower quadrant pain 11/16/2016    Abdominal pain, RLQ (right lower quadrant)    Stiffness of left shoulder, not elsewhere classified 01/09/2020    Stiff shoulder, left    Unspecified asthma, uncomplicated ([facility]) 01/14/2019    Asthma in adult without complication, unspecified asthma severity, unspecified whether persistent    Unspecified conjunctivitis 06/24/2015    Conjunctivitis of left eye    Urticaria, unspecified 02/29/2016    Hives of unknown origin    Vomiting, unspecified 11/15/2016    Dry heaves       Surgical History  Past Surgical History:   Procedure Laterality Date    BLADDER SURGERY  02/01/2018    Bladder Surgery    CHOLECYSTECTOMY  02/01/2018    Cholecystectomy    COLONOSCOPY  03/23/2018    Complete Colonoscopy    OTHER SURGICAL HISTORY  05/09/2018    Aortic Valve Replacement Transcatheter    TONSILLECTOMY  03/23/2018    Tonsillectomy    TOTAL VAGINAL HYSTERECTOMY  03/23/2018    Vaginal Hysterectomy        Medications  Current Outpatient Medications   Medication Instructions    albuterol 90 mcg/actuation inhaler 2 puffs, inhalation, Every 6 hours PRN    albuterol 1.25 mg, nebulization, Every 4 hours PRN, Enough per box for 30 days if using 120 packets    aspirin 81 mg, oral, Daily    atorvastatin (Lipitor) 20 mg tablet TAKE ONE TABLET BY MOUTH EVERY DAY     cholecalciferol (Vitamin D-3) 125 MCG (5000 UT) capsule 1 capsule, Daily    clobetasol (Temovate) 0.05 % cream Apply to affected areas of thickened skin on the arms twice daily    empagliflozin (JARDIANCE) 10 mg, oral, Daily    famotidine (PEPCID) 20 mg, Daily    FLUoxetine (PROzac) 20 mg capsule TAKE ONE CAPSULE BY MOUTH EVERY DAY    fluticasone (Flonase Allergy Relief) 50 mcg/actuation nasal spray 1 spray, Each Nostril, Daily, Shake gently. Before first use, prime pump. After use, clean tip and replace cap.    nebulizers misc 1 Device, miscellaneous, See admin instructions, Compressor and nebulizer kit (tubing and mouth piece)        Diagnostic Results:  Results from last 7 days   Lab Units 11/21/24  0553 11/20/24  1155 11/19/24  1121   WBC AUTO x10*3/uL 6.3 7.1 6.6   HEMOGLOBIN g/dL 10.2* 11.1* 11.3*   PLATELETS AUTO x10*3/uL 169 179 184      Results from last 7 days   Lab Units 11/21/24  0553 11/20/24  1155 11/19/24  1121 11/19/24  1121   SODIUM mmol/L 137 139  --  136   POTASSIUM mmol/L 3.7 4.3  --  4.1   CHLORIDE mmol/L 104 106  --  103   CO2 mmol/L 25 27  --  25   BUN mg/dL 16 14  --  14   CREATININE mg/dL 0.85 0.96  --  0.88   GLUCOSE mg/dL 86 86  --  77   MAGNESIUM mg/dL 1.83 1.99   < >  --    PHOSPHORUS mg/dL 4.3 3.9   < >  --     < > = values in this interval not displayed.           === 11/19/24 ===  CT TAVR FULL CONTRAST CHEST ABDOMEN PELVIS  - Impression -  1. Scattered mild-to-moderate atherosclerotic changes involving the  thoracoabdominal aorta and its branches. The access vessels are  patent.  2. Status post TAVR.  3. Slightly dilated right and left main pulmonary arteries which can  be seen in pulmonary artery hypertension.  4.  Multiple prominent lymph nodes are identified throughout the  mediastinum, nonspecific and likely reactive.  5. Moderate to large right and moderate left pleural effusion with  associated atelectasis. Interlobular septal thickening mosaic  attenuation suggestive of  pulmonary edema. Correlate with volume  status.  6. Multiple additional findings as described above.        Assessment/Plan   Assessment:  Aure is a 84 y.o. female with h/oHTN, GERD, diastolic HF, mitral regurgitation, CAD, LBBB, aortic stenosis s/p TAVR 3/2018, and SDH 6/2024 p/w SOB, NSGY consulted for heparin clearance in the setting of recent SDH.      Plan:  - CT head wo contrast prior to surgery  - Further recs pending imaging, please page or chat when CTH is complete      Miles Dumont MD

## 2024-11-22 ENCOUNTER — APPOINTMENT (OUTPATIENT)
Dept: CARDIOLOGY | Facility: HOSPITAL | Age: 84
DRG: 287 | End: 2024-11-22
Payer: MEDICARE

## 2024-11-22 LAB
ALBUMIN SERPL BCP-MCNC: 3.8 G/DL (ref 3.4–5)
ANION GAP SERPL CALC-SCNC: 13 MMOL/L (ref 10–20)
BUN SERPL-MCNC: 13 MG/DL (ref 6–23)
CALCIUM SERPL-MCNC: 8.6 MG/DL (ref 8.6–10.6)
CHLORIDE SERPL-SCNC: 104 MMOL/L (ref 98–107)
CO2 SERPL-SCNC: 24 MMOL/L (ref 21–32)
CREAT SERPL-MCNC: 0.82 MG/DL (ref 0.5–1.05)
EGFRCR SERPLBLD CKD-EPI 2021: 70 ML/MIN/1.73M*2
ERYTHROCYTE [DISTWIDTH] IN BLOOD BY AUTOMATED COUNT: 13.6 % (ref 11.5–14.5)
GLUCOSE SERPL-MCNC: 80 MG/DL (ref 74–99)
HCT VFR BLD AUTO: 31.3 % (ref 36–46)
HGB BLD-MCNC: 10.2 G/DL (ref 12–16)
MAGNESIUM SERPL-MCNC: 1.87 MG/DL (ref 1.6–2.4)
MCH RBC QN AUTO: 29.1 PG (ref 26–34)
MCHC RBC AUTO-ENTMCNC: 32.6 G/DL (ref 32–36)
MCV RBC AUTO: 89 FL (ref 80–100)
NRBC BLD-RTO: 0 /100 WBCS (ref 0–0)
PHOSPHATE SERPL-MCNC: 4.2 MG/DL (ref 2.5–4.9)
PLATELET # BLD AUTO: 171 X10*3/UL (ref 150–450)
POTASSIUM SERPL-SCNC: 3.9 MMOL/L (ref 3.5–5.3)
RBC # BLD AUTO: 3.51 X10*6/UL (ref 4–5.2)
SODIUM SERPL-SCNC: 137 MMOL/L (ref 136–145)
WBC # BLD AUTO: 6.4 X10*3/UL (ref 4.4–11.3)

## 2024-11-22 PROCEDURE — 2500000001 HC RX 250 WO HCPCS SELF ADMINISTERED DRUGS (ALT 637 FOR MEDICARE OP): Performed by: NURSE PRACTITIONER

## 2024-11-22 PROCEDURE — 75710 ARTERY X-RAYS ARM/LEG: CPT | Performed by: INTERNAL MEDICINE

## 2024-11-22 PROCEDURE — 2500000002 HC RX 250 W HCPCS SELF ADMINISTERED DRUGS (ALT 637 FOR MEDICARE OP, ALT 636 FOR OP/ED)

## 2024-11-22 PROCEDURE — 2550000001 HC RX 255 CONTRASTS: Performed by: INTERNAL MEDICINE

## 2024-11-22 PROCEDURE — 99153 MOD SED SAME PHYS/QHP EA: CPT | Performed by: INTERNAL MEDICINE

## 2024-11-22 PROCEDURE — 99152 MOD SED SAME PHYS/QHP 5/>YRS: CPT | Performed by: INTERNAL MEDICINE

## 2024-11-22 PROCEDURE — 85027 COMPLETE CBC AUTOMATED: CPT | Performed by: NURSE PRACTITIONER

## 2024-11-22 PROCEDURE — B2111ZZ FLUOROSCOPY OF MULTIPLE CORONARY ARTERIES USING LOW OSMOLAR CONTRAST: ICD-10-PCS | Performed by: INTERNAL MEDICINE

## 2024-11-22 PROCEDURE — 76937 US GUIDE VASCULAR ACCESS: CPT | Performed by: INTERNAL MEDICINE

## 2024-11-22 PROCEDURE — 93458 L HRT ARTERY/VENTRICLE ANGIO: CPT | Performed by: INTERNAL MEDICINE

## 2024-11-22 PROCEDURE — 1200000002 HC GENERAL ROOM WITH TELEMETRY DAILY

## 2024-11-22 PROCEDURE — 99233 SBSQ HOSP IP/OBS HIGH 50: CPT | Performed by: INTERNAL MEDICINE

## 2024-11-22 PROCEDURE — 2500000001 HC RX 250 WO HCPCS SELF ADMINISTERED DRUGS (ALT 637 FOR MEDICARE OP)

## 2024-11-22 PROCEDURE — 4A023N7 MEASUREMENT OF CARDIAC SAMPLING AND PRESSURE, LEFT HEART, PERCUTANEOUS APPROACH: ICD-10-PCS | Performed by: INTERNAL MEDICINE

## 2024-11-22 PROCEDURE — 36415 COLL VENOUS BLD VENIPUNCTURE: CPT | Performed by: NURSE PRACTITIONER

## 2024-11-22 PROCEDURE — 2500000004 HC RX 250 GENERAL PHARMACY W/ HCPCS (ALT 636 FOR OP/ED): Performed by: INTERNAL MEDICINE

## 2024-11-22 PROCEDURE — B2151ZZ FLUOROSCOPY OF LEFT HEART USING LOW OSMOLAR CONTRAST: ICD-10-PCS | Performed by: INTERNAL MEDICINE

## 2024-11-22 PROCEDURE — 83735 ASSAY OF MAGNESIUM: CPT | Performed by: NURSE PRACTITIONER

## 2024-11-22 PROCEDURE — 2500000004 HC RX 250 GENERAL PHARMACY W/ HCPCS (ALT 636 FOR OP/ED)

## 2024-11-22 PROCEDURE — C1894 INTRO/SHEATH, NON-LASER: HCPCS | Performed by: INTERNAL MEDICINE

## 2024-11-22 PROCEDURE — 70450 CT HEAD/BRAIN W/O DYE: CPT | Performed by: RADIOLOGY

## 2024-11-22 PROCEDURE — 2720000007 HC OR 272 NO HCPCS: Performed by: INTERNAL MEDICINE

## 2024-11-22 PROCEDURE — 80069 RENAL FUNCTION PANEL: CPT | Performed by: NURSE PRACTITIONER

## 2024-11-22 RX ORDER — LANOLIN ALCOHOL/MO/W.PET/CERES
400 CREAM (GRAM) TOPICAL ONCE
Status: COMPLETED | OUTPATIENT
Start: 2024-11-22 | End: 2024-11-22

## 2024-11-22 RX ORDER — MIDAZOLAM HYDROCHLORIDE 1 MG/ML
INJECTION, SOLUTION INTRAMUSCULAR; INTRAVENOUS AS NEEDED
Status: DISCONTINUED | OUTPATIENT
Start: 2024-11-22 | End: 2024-11-22 | Stop reason: HOSPADM

## 2024-11-22 RX ORDER — POTASSIUM CHLORIDE 20 MEQ/1
20 TABLET, EXTENDED RELEASE ORAL ONCE
Status: COMPLETED | OUTPATIENT
Start: 2024-11-22 | End: 2024-11-22

## 2024-11-22 RX ORDER — LIDOCAINE HYDROCHLORIDE 10 MG/ML
INJECTION, SOLUTION EPIDURAL; INFILTRATION; INTRACAUDAL; PERINEURAL AS NEEDED
Status: DISCONTINUED | OUTPATIENT
Start: 2024-11-22 | End: 2024-11-22 | Stop reason: HOSPADM

## 2024-11-22 RX ORDER — LOSARTAN POTASSIUM 50 MG/1
50 TABLET ORAL DAILY
Status: DISCONTINUED | OUTPATIENT
Start: 2024-11-22 | End: 2024-11-23 | Stop reason: HOSPADM

## 2024-11-22 RX ORDER — FENTANYL CITRATE 50 UG/ML
INJECTION, SOLUTION INTRAMUSCULAR; INTRAVENOUS AS NEEDED
Status: DISCONTINUED | OUTPATIENT
Start: 2024-11-22 | End: 2024-11-22 | Stop reason: HOSPADM

## 2024-11-22 ASSESSMENT — COGNITIVE AND FUNCTIONAL STATUS - GENERAL
MOBILITY SCORE: 24
DAILY ACTIVITIY SCORE: 24
DAILY ACTIVITIY SCORE: 24
MOBILITY SCORE: 24

## 2024-11-22 ASSESSMENT — PAIN SCALES - GENERAL
PAINLEVEL_OUTOF10: 0 - NO PAIN
PAINLEVEL_OUTOF10: 0 - NO PAIN

## 2024-11-22 ASSESSMENT — PAIN - FUNCTIONAL ASSESSMENT: PAIN_FUNCTIONAL_ASSESSMENT: 0-10

## 2024-11-22 NOTE — PRE-SEDATION DOCUMENTATION
Sedation Plan    ASA 3     Mallampati class: III.    Risks, benefits, and alternatives discussed with patient.      Pt NPO since midnight, all questions answered. No previous issues with anesthesia.

## 2024-11-22 NOTE — INTERVAL H&P NOTE
Patient seen and assessed pre-procedure. Allergies and current medications reviewed. NPO since midnight, all questions answered. Plan for LHC pending Neurology clearance should need for DAPT be indicted. Denies any current CP or SOB, comfortable on RA. Does report mild cough. Labs reviewed, Hgb/Plt 10.2/171, GFR/Cr 67/0.85 Patient currently on ASA 81 only.     H&P reviewed. The patient was examined and there are no changes to the H&P.

## 2024-11-22 NOTE — SIGNIFICANT EVENT
CT head reviewed and shows complete resolution of prior subdural hematoma. Ok for anticoagulation. No other imaging is needed after starting anticoagulation unless clinically indicated.     Patient does not need follow up with neurosurgery. Thank you for allowing us to participate in the care of this patient. Will sign off at this time. Please page [phone] with any questions or concerns.    Patt Ware MD  PGY-2 Neurosurgery  6:22 AM

## 2024-11-22 NOTE — DISCHARGE INSTRUCTIONS
CARDIAC CATHETERIZATION DISCHARGE INSTRUCTIONS (procedure done on 11/22/2024)     FOR SUDDEN AND SEVERE CHEST PAIN, SHORTNESS OF BREATH, EXCESSIVE BLEEDING, SIGNS OF STROKE, OR CHANGES IN MENTAL STATUS YOU SHOULD CALL 911 IMMEDIATELY.     FOR NEXT 24 HOURS  - Upon discharge, you should return home and rest for the remainder of the day and evening. You do not have to stay on bed rest but should not be very active.  It is recommended a responsible adult be with you for the first 24 hours after the procedure.  - No driving for 24 hours after procedure. Please arrange for someone to drive you home from the hospital today.  - Do not drive, operate machinery, or use power tools for 24 hours after your procedure.  - Do not make any legal decisions for 24 hours after your procedure.  - Do not drink alcoholic beverages for 24 hours after your procedure.    WOUND CARE   *FOR FEMORAL (LEG) ACCESS*  ·      Avoid heavy lifting (over 10 pounds) for 7 days, squatting or excessive bending for 2 days, and strenuous exercise for 7 days.  ·      No submerged bathing, swimming, or hot tubs for the next 7 days, or until fully healed.  ·      Avoid sexual activity for 3-4 days until any groin discomfort has ceased.    - The transparent dressing should be removed from the site 24 hours after the procedure.  Wash the site gently with soap and water. Rinse well and pat dry. Keep the area clean and dry. You may apply a Band-Aid to the site. Avoid lotions, ointments, or powders until fully healed.  - You may shower the day after your procedure.   - It is normal to notice a small bruise around the puncture site and/or a small grape sized or smaller lump. Any large bruising or large lump warrants a call to the office.   - If bleeding should occur, lay down and apply pressure to the affected area for 10 minutes.  If the bleeding stops notify your physician.  If there is a large amount of bleeding or spurting of blood CALL 911 immediately.   DO NOT drive yourself to the hospital.  - You may experience some tenderness, bruising or minimal inflammation.  If you have any concerns, you may contact the Cath Lab or if any of these symptoms become excessive, contact your cardiologist or go to the emergency room.     OTHER INSTRUCTIONS  - You may take acetaminophen (Tylenol) as directed for discomfort.  If pain is not relieved with acetaminophen (Tylenol), contact your doctor.  - If you notice or experience any of the following, you should notify your doctor or seek medical attention  Chest pain or discomfort  Change in mental status or weakness in extremities.  Dizziness, light headedness, or feeling faint.  Change in the site where the procedure was performed, such as bleeding or an increased area of bruising or swelling.  Tingling, numbness, pain, or coolness in the leg/arm beyond the site where the procedure was performed.  Signs of infection (i.e. shaking chills, temperature > 100 degrees Fahrenheit, warmth, redness) in the leg/arm area where the procedure was performed.  Changes in urination   Bloody or black stools  Vomiting blood  Severe nose bleeds  Any excessive bleeding    - If you DO NOT have an appointment with your cardiologist within 2-4 weeks following your procedure, please contact their office.

## 2024-11-22 NOTE — PROGRESS NOTES
"Subjective   Patient seen this AM, denies chest pain or shortness of breath. Pending C today.     Today in Brief:   - CTH completed this AM showed no subdural hematoma. Per neurology, patient is cleared for anticoagulation.   - Dr. Preciado reviewed prior post- TAVR imaging today which remained unchanged and deemed SEN not necessary at this time, low concern for endocarditis; SEN cancelled  - LHC pending today  - per SH, TAVR likely to be completed outpatient   - losartan increased to 50 mg today    Objective     Physical Exam  Constitutional:       General: She is not in acute distress.     Appearance: Normal appearance.   HENT:      Head: Normocephalic and atraumatic.      Mouth/Throat:      Mouth: Mucous membranes are moist.   Eyes:      Extraocular Movements: Extraocular movements intact.      Pupils: Pupils are equal, round, and reactive to light.   Cardiovascular:      Rate and Rhythm: Normal rate and regular rhythm.      Heart sounds: Murmur heard.      Systolic murmur is present with a grade of 4/6.   Pulmonary:      Effort: Pulmonary effort is normal. No respiratory distress.      Breath sounds: Normal breath sounds.   Abdominal:      General: Abdomen is flat.   Musculoskeletal:      Cervical back: Normal range of motion.      Right lower leg: No edema.      Left lower leg: No edema.   Skin:     General: Skin is warm and dry.   Neurological:      General: No focal deficit present.      Mental Status: She is alert and oriented to person, place, and time.   Psychiatric:         Mood and Affect: Mood normal.         Behavior: Behavior normal.     Last Recorded Vitals  Blood pressure 159/55, pulse 88, temperature 36.9 °C (98.4 °F), temperature source Temporal, resp. rate 18, height 1.626 m (5' 4\"), weight 61 kg (134 lb 7.7 oz), SpO2 95%.  Intake/Output last 3 Shifts:  I/O last 3 completed shifts:  In: 480 (7.9 mL/kg) [P.O.:480]  Out: 450 (7.4 mL/kg) [Urine:450 (0.2 mL/kg/hr)]  Weight: 61 kg     Relevant " Results  Scheduled medications  aspirin, 81 mg, oral, Daily  atorvastatin, 20 mg, oral, Nightly  cholecalciferol, 5,000 Units, oral, Daily  famotidine, 20 mg, oral, Daily  FLUoxetine, 20 mg, oral, Daily  fluticasone, 1 spray, Each Nostril, Daily  losartan, 50 mg, oral, Daily  polyethylene glycol, 17 g, oral, Daily      Continuous medications     PRN medications  PRN medications: acetaminophen, albuterol  Results for orders placed or performed during the hospital encounter of 11/19/24 (from the past 24 hours)   CBC   Result Value Ref Range    WBC 6.4 4.4 - 11.3 x10*3/uL    nRBC 0.0 0.0 - 0.0 /100 WBCs    RBC 3.51 (L) 4.00 - 5.20 x10*6/uL    Hemoglobin 10.2 (L) 12.0 - 16.0 g/dL    Hematocrit 31.3 (L) 36.0 - 46.0 %    MCV 89 80 - 100 fL    MCH 29.1 26.0 - 34.0 pg    MCHC 32.6 32.0 - 36.0 g/dL    RDW 13.6 11.5 - 14.5 %    Platelets 171 150 - 450 x10*3/uL   Renal Function Panel   Result Value Ref Range    Glucose 80 74 - 99 mg/dL    Sodium 137 136 - 145 mmol/L    Potassium 3.9 3.5 - 5.3 mmol/L    Chloride 104 98 - 107 mmol/L    Bicarbonate 24 21 - 32 mmol/L    Anion Gap 13 10 - 20 mmol/L    Urea Nitrogen 13 6 - 23 mg/dL    Creatinine 0.82 0.50 - 1.05 mg/dL    eGFR 70 >60 mL/min/1.73m*2    Calcium 8.6 8.6 - 10.6 mg/dL    Phosphorus 4.2 2.5 - 4.9 mg/dL    Albumin 3.8 3.4 - 5.0 g/dL   Magnesium   Result Value Ref Range    Magnesium 1.87 1.60 - 2.40 mg/dL     Last Cardiology Tests:  11/12/2024 - CXR  Mild interstitial congestion with effusions and basilar atelectasis.     10/28/2024 - TTE  1. The left ventricular systolic function is normal, with a Lee's biplane calculated ejection fraction of 56%.  2. Spectral Doppler shows a restrictive pattern of left ventricular diastolic filling.  3. There is normal right ventricular global systolic function.  4. The left atrium is moderately dilated.  5. There is a large pleural effusion.  6. The mitral valve is moderately thickened.  7. There is moderate mitral annular  calcification.  8. Moderate mitral valve regurgitation.  9. There is a Medtronic transcatheter aortic valve replacement, with a 26mm reported size.  10. Moderate aortic valve regurgitation.  11. Severely elevated pulmonary artery pressure.  12. There is a moderately sized atrial septal defect.  13. There is moderate dilatation of the aortic root.     09/11/2024 - CTA Chest for PE  1.  Mildly limited study due to motion artifact. No obvious pulmonary emboli.   2. Moderate bilateral pleural effusion with adjacent compressive atelectasis. Additional scattered small regions of atelectasis.   3.  Scattered small and slightly enlarged mediastinal nodes.      09/11/2024 - CXR  Increasing pleural effusions.      04/19/2023 - TTE  1. Left ventricular systolic function is normal with a 65% estimated ejection fraction.  2. Spectral Doppler shows a pseudonormal pattern of left ventricular diastolic filling.  3. RVSP within normal limits.  4. Aortic valve appears abnormal.  5. There is a transcatheter aortic valve replacement.  6. Left ventricular cavity size is decreased.  7. AV gradient and estimated aortic valve area has changed significantly since prior exam.  8. Today's exam: mean gradient of 33 mmHg, peak gradient 50 mmHg, DI .31, JONNY .9 cm2.  9. Prior exam 4/6/22: Mean gradient 9.3 mm Hg, peak gradient 17 mmHg, DI .49, There is now a small pedunculated echo density seen in LVOT, has been seen previously, suspect relates to valve degeneration, has not changed in appearance from prior exam.    03/16/2018 - TAVR  1. Transcatheter aortic valve replacement with successful implantation of EvoluteR Pro 26 mm valve.  2. Patient was enrolled in a research study and data was included in the TVT Registry.            Assessment/Plan   Aure Hdez is an 84 year old female with a PMH of CAD, LBBB, aortic stenosis s/p TAVR 03/16/2018, HTN, diastolic heart failure, mitral regurgitation, GERD, depression, SDH, and syncope. The patient  underwent TAVR on 03/16/2018 and has been followed by Dr. Preciado and LAURA Duffy-CNP. Patient has been experiencing worsening shortness of breath which is what brought her in today.  She states that she has been admitted to hospital on four separate occasions over the past several months, noting that prior to 1 admission she had had a syncopal episode. Patient is admitted to [facility] for structural heart evaluation.      Aortic insufficiency   Hx of Aortic Stenosis s/p TAVR 03/16/2018   - TTE 10/28 EF 56% Aortic Valve: There is a prosthetic aortic valve present. The aortic valve dimensionless index is 0.30. There is a Medtronic transcatheter aortic valve replacement, with a 26 mm reported size. There is moderate lalo-prosthetic aortic valve regurgitation. There is moderate aortic valve regurgitation. The peak instantaneous gradient of the aortic valve is 80.2 mmHg. The mean gradient of the aortic valve is 41.7 mmHg. In comparison to the previous echocardiogram(s): Compared with study dated 4/19/2023, Degree of AI has increased, trans-AVR gradients are severely increased, RVSP is now severely increased. MR has increased. Mobile echodensity is again seen in the LVOT - suggested SEN.   - Consulted structural heart for further evaluation, following  - for  work-up: CTS consulted, LHC pending, CT TAVR completed, BC X2 NGTD X2 days to rule out endocarditis   - SEN attempted 11/20 but unable to pass probe  -  team provided update today after discussing with Dr. Preciado-> Dr. Preciado reviewed prior post- TAVR imaging and echodensity has been present since TAVR was placed; therefore, low suspicion for endocarditis and SEN deemed not necessary at this time;  to update patient today  - per , likely Ruth TAVR to be done as outpatient   - Continue home ASA 81 mg daily, atorvastatin 20 mg daily      Chronic Diastolic Heart Failure   - TTE 10/28 as above   - BNP on admit 596 (1151 1 month prior)  - warm and dry  on exam, no edema, no JVD  - admit CXR: Redemonstrated mild bilateral interstitial edema with small bilateral pleural effusions and bibasilar atelectasis.  - Diurese PRN  - o/p cards had considered Jardiance, but was not started  - had taken lasix daily until she ran out, did not get refilled-> currently euvolemic on exam    HTN  - last 24 hour SBP range 150-170s  - losartan increased to 50 mg daily today  - per patient, has not been on a home BP med lately     Hx of Left frontal subdural hemorrhage, 6/24/24  - Admitted to [facility] for syncopal fall with a head injury  - follows with neurology at [facility]  - Initially ASA was held after discharge until 8/21/24 after serial CTs showed no evidence of acute hemorrhage. There was note of an interval decrease in the density of the small left frontal subdural hemorrhage.   - Was cleared for ASA 81mg daily on 8/21  - Per , patient requires [facility] for work-up. Due to recent SDH, neurosurgery consulted for recommendations regarding heparin products/ anticoagulation with C and potential Ruth TAVR.   - CTH completed 11/22 this AM showed no subdural hematoma.  - Per neurology, patient is cleared for anticoagulation.      Depression  - continue home fluoxetine 20mg daily      Dispo: Pending C today  DVT ppx: mechanical ppx  Code Status: Full Code, confirmed upon admission     Seen and discussed with Dr. Sathya Laurent, APRN-CNP

## 2024-11-22 NOTE — CARE PLAN
The patient's goals for the shift include  prepare for LHC, SEN and head CT tomorrow    The clinical goals for the shift include Pt will deny SOB    Over the shift, the patient made progress toward the following goals.       Problem: Safety - Adult  Goal: Free from fall injury  Outcome: Progressing     Problem: Discharge Planning  Goal: Discharge to home or other facility with appropriate resources  Outcome: Progressing     Problem: Pain - Adult  Goal: Verbalizes/displays adequate comfort level or baseline comfort level  Outcome: Progressing     Problem: Chronic Conditions and Co-morbidities  Goal: Patient's chronic conditions and co-morbidity symptoms are monitored and maintained or improved  Outcome: Progressing

## 2024-11-22 NOTE — PROGRESS NOTES
11/22/24        Transitional Care Coordination Progress Note:   Patient discussed during interdisciplinary rounds.   Team members present: MICAELA MOSS MD   Plan per Medical/Surgical team: Pending further evaluation with SEN, C   Discharge disposition: Home   Status-Inpatient   Payer- [employer] MEDICARE    Potential Barriers: None   ADOD: -2024   Will continue to monitor for all discharging needs  Isak Stewart RN [facility] [phone]

## 2024-11-22 NOTE — POST-PROCEDURE NOTE
Physician Transition of Care Summary  Invasive Cardiovascular Lab    Procedure Date: 11/22/2024  Attending:    * Carl B Gillombardo - Primary  Resident/Fellow/Other Assistant: Surgeons and Role:     * Brigitte Owen MD - Assisting     * Kacey Schneider MD - Assisting    Indications:   Pre-op Diagnosis      * Aortic valve stenosis, etiology of cardiac valve disease unspecified [I35.0]    Post-procedure diagnosis:   Post-op Diagnosis     * Aortic valve stenosis, etiology of cardiac valve disease unspecified [I35.0]    Procedure(s):   Left Heart Cath  93359 - MO L HRT CATH W/NJX L VENTRICULOGRAPHY IMG S&I        Procedure Findings:   LHC   LM: normal caliber, no significant atherosclerotic disease  LAD: normal caliber, no significant atherosclerotic disease  LCX: normal caliber, mild atherosclerotic disease  RCA: dominant normal caliber, no significant atherosclerotic disease    Mean Gradient across the LV and Ao: 36 mmHg using dual lumen pigtail.     Description of the Procedure:   L CFA 6 Fr--> Manual hemostasis     Complications:   None    Stents/Implants:   Implants       No implant documentation for this case.            Anticoagulation/Antiplatelet Plan:   Continue home ASA     Estimated Blood Loss:   5 mL    Anesthesia: Moderate Sedation Anesthesia Staff: No anesthesia staff entered.    Any Specimen(s) Removed:   No specimens collected during this procedure.    Disposition:   [facility]       Electronically signed by: Kacey Schneider MD, 11/22/2024 1:00 PM

## 2024-11-23 ENCOUNTER — PHARMACY VISIT (OUTPATIENT)
Dept: PHARMACY | Facility: CLINIC | Age: 84
End: 2024-11-23
Payer: MEDICARE

## 2024-11-23 VITALS
HEIGHT: 64 IN | BODY MASS INDEX: 22.7 KG/M2 | OXYGEN SATURATION: 94 % | SYSTOLIC BLOOD PRESSURE: 148 MMHG | RESPIRATION RATE: 18 BRPM | TEMPERATURE: 97.5 F | WEIGHT: 132.94 LBS | DIASTOLIC BLOOD PRESSURE: 50 MMHG | HEART RATE: 81 BPM

## 2024-11-23 LAB
ALBUMIN SERPL BCP-MCNC: 3.7 G/DL (ref 3.4–5)
ANION GAP SERPL CALC-SCNC: 14 MMOL/L (ref 10–20)
BACTERIA BLD CULT: NORMAL
BACTERIA BLD CULT: NORMAL
BUN SERPL-MCNC: 17 MG/DL (ref 6–23)
CALCIUM SERPL-MCNC: 8.7 MG/DL (ref 8.6–10.6)
CHLORIDE SERPL-SCNC: 104 MMOL/L (ref 98–107)
CO2 SERPL-SCNC: 22 MMOL/L (ref 21–32)
CREAT SERPL-MCNC: 0.94 MG/DL (ref 0.5–1.05)
EGFRCR SERPLBLD CKD-EPI 2021: 59 ML/MIN/1.73M*2
ERYTHROCYTE [DISTWIDTH] IN BLOOD BY AUTOMATED COUNT: 13.7 % (ref 11.5–14.5)
GLUCOSE SERPL-MCNC: 75 MG/DL (ref 74–99)
HCT VFR BLD AUTO: 32.3 % (ref 36–46)
HGB BLD-MCNC: 10.5 G/DL (ref 12–16)
MAGNESIUM SERPL-MCNC: 2.01 MG/DL (ref 1.6–2.4)
MCH RBC QN AUTO: 28.9 PG (ref 26–34)
MCHC RBC AUTO-ENTMCNC: 32.5 G/DL (ref 32–36)
MCV RBC AUTO: 89 FL (ref 80–100)
NRBC BLD-RTO: 0 /100 WBCS (ref 0–0)
PHOSPHATE SERPL-MCNC: 4.8 MG/DL (ref 2.5–4.9)
PLATELET # BLD AUTO: 162 X10*3/UL (ref 150–450)
POTASSIUM SERPL-SCNC: 4.2 MMOL/L (ref 3.5–5.3)
RBC # BLD AUTO: 3.63 X10*6/UL (ref 4–5.2)
SODIUM SERPL-SCNC: 136 MMOL/L (ref 136–145)
WBC # BLD AUTO: 6.6 X10*3/UL (ref 4.4–11.3)

## 2024-11-23 PROCEDURE — 80069 RENAL FUNCTION PANEL: CPT | Performed by: NURSE PRACTITIONER

## 2024-11-23 PROCEDURE — 99239 HOSP IP/OBS DSCHRG MGMT >30: CPT | Performed by: INTERNAL MEDICINE

## 2024-11-23 PROCEDURE — 2500000001 HC RX 250 WO HCPCS SELF ADMINISTERED DRUGS (ALT 637 FOR MEDICARE OP)

## 2024-11-23 PROCEDURE — 2500000001 HC RX 250 WO HCPCS SELF ADMINISTERED DRUGS (ALT 637 FOR MEDICARE OP): Performed by: NURSE PRACTITIONER

## 2024-11-23 PROCEDURE — RXMED WILLOW AMBULATORY MEDICATION CHARGE

## 2024-11-23 PROCEDURE — 85027 COMPLETE CBC AUTOMATED: CPT | Performed by: NURSE PRACTITIONER

## 2024-11-23 PROCEDURE — 83735 ASSAY OF MAGNESIUM: CPT | Performed by: NURSE PRACTITIONER

## 2024-11-23 RX ORDER — LOSARTAN POTASSIUM 50 MG/1
50 TABLET ORAL DAILY
Qty: 30 TABLET | Refills: 3 | Status: ON HOLD | OUTPATIENT
Start: 2024-11-24 | End: 2024-12-24

## 2024-11-23 ASSESSMENT — PAIN - FUNCTIONAL ASSESSMENT: PAIN_FUNCTIONAL_ASSESSMENT: 0-10

## 2024-11-23 ASSESSMENT — PAIN SCALES - GENERAL: PAINLEVEL_OUTOF10: 0 - NO PAIN

## 2024-11-23 NOTE — CARE PLAN
Problem: Safety - Adult  Goal: Free from fall injury  Outcome: Progressing     Problem: Discharge Planning  Goal: Discharge to home or other facility with appropriate resources  Outcome: Progressing     Problem: Pain - Adult  Goal: Verbalizes/displays adequate comfort level or baseline comfort level  Outcome: Progressing     Problem: Chronic Conditions and Co-morbidities  Goal: Patient's chronic conditions and co-morbidity symptoms are monitored and maintained or improved  Outcome: Progressing

## 2024-11-23 NOTE — CARE PLAN
The patient's goals for the shift include      The clinical goals for the shift include Pt will deny SOB    Over the shift, the patient did not make progress toward the following goals. Barriers to progression include . Recommendations to address these barriers include .

## 2024-11-23 NOTE — SIGNIFICANT EVENT
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    TTE demonstrates a mobile echodensity in the LVOT concerning for subvalvar membrane.  SEN could not be completed due to inability to pass probe.    Plan for outpatient TAVR with Dr. Preciado as an outpatient.    Discussed with the patient    Alexander Garvey MD

## 2024-11-23 NOTE — DISCHARGE SUMMARY
Discharge Diagnosis  Aortic valve stenosis, etiology of cardiac valve disease unspecified    Test Results Pending At Discharge  Pending Labs       Order Current Status    Blood Culture Preliminary result    Blood Culture Preliminary result            Hospital Course  Aure Hdez is a 84 y.o. female, accompanied by her  and daughter, presents with worsening exertional shortness of breath that started earlier this year.     Her PMH is significant for CAD, LBBB, aortic stenosis s/p TAVR 03/16/2018, HTN, diastolic heart failure, mitral regurgitation, GERD, depression, SDH 6/2024, and syncope. The patient underwent TAVR on 03/16/2018 and has been followed by Dr. Preciado and Sweetie Dubon, APRN-CNP.     She states that she has been admitted to hospital on four separate occasions over the past several months. Patient admits to 3 syncopal episodes in the past year, the most recent in June which resulted in a hospitalization and subdural hematoma.     At the time ASA was reversed with DDAVP and patient was monitored in the ICU, and later discharged. Patient advised to hold ASA and follow up for serial head CTs which showed no evidence of acute hemorrhage and an interval decrease in the density of the small left frontal subdural hemorrhage on 8/14/24. She was then admitted to [facility] in early September with COVID pneumonia.     The patient was readmitted to hospital later that month with acute on chronic diastolic heart failure. On 09/11/2024, CXR revealed small bilateral pleural effusions. Echocardiogram on 10/28/2024 demonstrated an EF of 56%, restrictive pattern of left ventricular diastolic filling, normal RV systolic function, a large pleural effusion, moderate mitral regurgitation, moderate aortic regurgitation, severely elevated pulmonary artery pressure, atrial septal defect and dilatation of the aortic root. Repeat CXR on 11/12/2024 revealed mild interstitial congestion with effusions and basilar  atelectasis.     Patient saw Dr. Greenfield on 11/18 who directed patient to be admitted for urgent evaluation of aortic insufficiency. On physical exam,patient denies chest pain, n/v/d. Patient appears euvolemic on exam, is warm and well compensated, in NAD. We will admit to [facility] for structural heart work up.     Floor course:     consulted for aortic insufficiency/stenosis. LHC completed as part of Ruth TAVR work-up which showed no significant CAD. Per  and Dr. Preciado, low concern for endocarditis at this time and did not recommend SEN imaging.  LHC completed-> no significant CAD. Per , Ruth to be completed outpatient. BC X2 showed no growth at day 3, will trend till completion. Will be discussed at valve meeting and called with plan per structural. Neurosurgery consulted for heparin product recommendations iso recent SDH 6/2024. CTH completed 11/22 showed no subdural hematoma. Neurosurg cleared patient for anticoagulation.   Started on losartan 50mg daily, SB improve from 150s to 120s, feeling well.     Discharge weight: 60.3 kg    After all labs and VS were reviewed the decision was made that the patient was medically stable for discharge.  The patient was discharged in satisfactory condition.    More than 60 minutes were spent in coordinating patient discharge.      Pertinent Physical Exam At Time of Discharge  Physical Exam    Home Medications     Medication List      START taking these medications     losartan 50 mg tablet; Commonly known as: Cozaar; Take 1 tablet (50 mg)   by mouth once daily.; Start taking on: November 24, 2024     CHANGE how you take these medications     albuterol 90 mcg/actuation inhaler; Inhale 2 puffs every 6 hours if   needed for wheezing.; What changed: Another medication with the same name   was removed. Continue taking this medication, and follow the directions   you see here.     CONTINUE taking these medications     aspirin 81 mg chewable tablet; Chew 1 tablet (81 mg) once  daily.   atorvastatin 20 mg tablet; Commonly known as: Lipitor; TAKE ONE TABLET   BY MOUTH EVERY DAY   cholecalciferol 125 mcg (5000 UT) capsule; Commonly known as: Vitamin   D-3   clobetasol 0.05 % cream; Commonly known as: Temovate; Apply to affected   areas of thickened skin on the arms twice daily   famotidine 20 mg tablet; Commonly known as: Pepcid   FLUoxetine 20 mg capsule; Commonly known as: PROzac; TAKE ONE CAPSULE BY   MOUTH EVERY DAY   fluticasone 50 mcg/actuation nasal spray; Commonly known as: Flonase   Allergy Relief; Administer 1 spray into each nostril once daily. Shake   gently. Before first use, prime pump. After use, clean tip and replace   cap.   nebulizers misc; 1 Device see administration instructions. Compressor   and nebulizer kit (tubing and mouth piece)     STOP taking these medications     empagliflozin 10 mg; Commonly known as: Jardiance       Outpatient Follow-Up  Future Appointments   Date Time Provider Department Center   11/26/2024 11:30 AM Thea Briceno APRN-CNP [id number] [facility]   1/2/2025 11:30 AM Rito Tovar MD [id number] [facility]   4/7/2025 10:00 AM [facility] 1 [id number] [facility]   4/7/2025 11:00 AM Fredy Preciado MD [id number] [facility]       Miles Bender, APRN-CNP, DNP

## 2024-11-23 NOTE — NURSING NOTE
Discharge instructions reviewed with patient and family. All questions answered. No furter concerns at time of discharge.

## 2024-11-25 ENCOUNTER — CARDIOLOGY CONFERENCE (OUTPATIENT)
Dept: CARDIOLOGY | Facility: HOSPITAL | Age: 84
End: 2024-11-25
Payer: MEDICARE

## 2024-11-25 ENCOUNTER — APPOINTMENT (OUTPATIENT)
Dept: CARDIOLOGY | Facility: CLINIC | Age: 84
End: 2024-11-25
Payer: MEDICARE

## 2024-11-25 NOTE — PROGRESS NOTES
Valve and Structural Heart Multidisciplinary Meeting   This note reflects a summary of a case conference discussion between a multidisciplinary team of interventional cardiologists, cardiac surgeons, APPs, nurse navigators, and research team.  The suggestions and impressions in this note reflect group consensus opinion but do not substitute bedside evaluation and management by the primary team.     Attendees:  Dr. Tucker, Dr. Acosta, Dr. Garvey, Hayden Woodward, Mason Stern, Katelin Ko, Jose Hawthorne, Nohemy oneal, Jam Cardenas, Jenny Fuller, Geeta Manzo, Nicol Linton, Dr. Francis Looney MARC Hdez is a 84 y.o. year old female  Medical record number: [id number]    Referring Provider Miles Bender, LAURA-CNP, RITCHIE    Brief Clinical Summary - clinical presentation/comorbidities:  presents with worsening exertional shortness of breath that started earlier this year. Her PMH is significant for CAD, LBBB, aortic stenosis s/p TAVR 03/16/2018, HTN, diastolic heart failure, mitral regurgitation, GERD, depression, SDH 6/2024, and syncope. The patient underwent TAVR on 03/16/2018 and has been followed by Dr. Preciado and SEBASTIAN Duffy.     Valve and Structural Heart Work Up   - NYHA: III   - Frailty: 2/5        - EKG: NSR,  ms, QRS 80 ms.   - TTE 10/28: EF 56%, s/p TAVR with Evolut 26, severe aortic stenosis with AV gradients 80/41, Vmax 4.48, DI 0.3, moderate valvular regurgitation with P1/2T 158msec, Moderate MR. ASD with left to right shunt. Svevere pulmonary HTN w/ SPAP of 94mmHg.  Mobile echodensity in LVOT.   -SEN: did not tolerate probe down her throat.    - CT TAVR: done.    - Blood Cx X2 Pending   - [facility]: 11/22/2024  - dental clearance: regular dentist visits q6 months, no issues.   - STS Procedure Type: Isolated AVR   Perioperative OutcomeEstimate %   Operative Mortality6.52%   KCCQ/Walk done       Group consensus recommendation: Recent inpatient admission. :  Blood cultures have been  negative to date. Will plan to forego SEN.  Patient currently admitted.  Normal EF.  Cath reviewed - no obstructive CAD. Plan for ROSIO TAVR as outpatient. Patient with high operative risk.  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 Following previous TAVR gradients reduced.  CT read: Access Right.  Marie 20 vs. Evolut.  Will need to confirm valve with Dr. Preciado.     12/12: Discussed further with Dr. Preciado plan for TAVR. Patient may be candidate for upcoming Restore trial. In vivo sizing.  No LVOT calcium.  Existing Evolut 26. Plan  ROSIO TAVR with overfilled Marie 20.       To contact the  [facility] contact  Transfer Center or the office [phone].

## 2024-11-26 ENCOUNTER — OFFICE VISIT (OUTPATIENT)
Dept: PRIMARY CARE | Facility: CLINIC | Age: 84
End: 2024-11-26
Payer: MEDICARE

## 2024-11-26 ENCOUNTER — APPOINTMENT (OUTPATIENT)
Dept: PRIMARY CARE | Facility: CLINIC | Age: 84
End: 2024-11-26
Payer: MEDICARE

## 2024-11-26 VITALS
BODY MASS INDEX: 22.98 KG/M2 | WEIGHT: 134.6 LBS | DIASTOLIC BLOOD PRESSURE: 58 MMHG | HEART RATE: 105 BPM | HEIGHT: 64 IN | SYSTOLIC BLOOD PRESSURE: 154 MMHG | OXYGEN SATURATION: 97 %

## 2024-11-26 DIAGNOSIS — Z09 HOSPITAL DISCHARGE FOLLOW-UP: Primary | ICD-10-CM

## 2024-11-26 DIAGNOSIS — I35.0 NONRHEUMATIC AORTIC VALVE STENOSIS: ICD-10-CM

## 2024-11-26 ASSESSMENT — ENCOUNTER SYMPTOMS
CARDIOVASCULAR NEGATIVE: 1
PSYCHIATRIC NEGATIVE: 1
LIGHT-HEADEDNESS: 0
DIZZINESS: 0
SHORTNESS OF BREATH: 1
CHEST TIGHTNESS: 1
APPETITE CHANGE: 1
GASTROINTESTINAL NEGATIVE: 1
FATIGUE: 1

## 2024-11-26 NOTE — PROGRESS NOTES
"Subjective   Patient ID: Aure Hdez is a 84 y.o. female who presents for Hospital Follow-up (Hospital follow up, call center scheduled. Aortic  Stenosis/Rb pt /Lov 11/5/24/Nov 1/2/25/Pt is waiting for heart surgery, she's waiting on a call from cardiology but has not yet heard from them. ).    HPI   Patient of Dr Tovar here for hospital follow up. Last office visit on 11/05/2024.  In the company of  in exam room.   Current concern:  1) Aure seen in ER 11/19/2024 SOB- Aortic valve stenosis, etiology of cardiac valve disease.   Discharged 11/23/2024.  She is waiting on Cardiology to return call to schedule surgery. Started on Losartan 50 mg at discharge.  Continue on ASA 81 mg, Atorvastatin 20 mg and to stop Empagliflozin   Chronic Concerns; CAD, Depression, GERD, Mixed hyperlipidemia, Insomnia, Hypokalemia, elevated liver enzymes.  Specialist  - cardiology   Labs while hospitalized.   Review of Systems   Constitutional:  Positive for appetite change (loss of appetite) and fatigue.   Respiratory:  Positive for chest tightness and shortness of breath.    Cardiovascular: Negative.    Gastrointestinal: Negative.    Neurological:  Negative for dizziness and light-headedness.   Psychiatric/Behavioral: Negative.       Objective   /58 (BP Location: Right arm, Patient Position: Sitting, BP Cuff Size: Adult)   Pulse 105   Ht 1.626 m (5' 4\")   Wt 61.1 kg (134 lb 9.6 oz)   SpO2 97%   BMI 23.10 kg/m²     Physical Exam  Vitals reviewed.   Constitutional:       Appearance: She is normal weight.   Eyes:      General: Lids are normal.      Conjunctiva/sclera: Conjunctivae normal.   Cardiovascular:      Rate and Rhythm: Normal rate.      Heart sounds: Murmur heard.   Pulmonary:      Effort: Pulmonary effort is normal.      Breath sounds: Normal breath sounds. No decreased breath sounds, wheezing, rhonchi or rales.   Musculoskeletal:      Cervical back: Neck supple.   Skin:     General: Skin is warm.      Findings: " No rash.   Neurological:      General: No focal deficit present.      Mental Status: She is alert.      Coordination: Coordination is intact.      Gait: Gait is intact.   Psychiatric:         Attention and Perception: Attention normal.         Behavior: Behavior normal. Behavior is cooperative.       Assessment/Plan   Diagnoses and all orders for this visit:  Hospital discharge follow-up  / Nonrheumatic aortic valve stenosis  Patient stable, Denies concern with rx Losartan, BP although slightly elevated improved overall.    Reviewed labs from Hospitalization - overall unremarkable     PLAN: Follow up as scheduled with Dr Tovar

## 2024-12-02 ENCOUNTER — TELEPHONE (OUTPATIENT)
Dept: CARDIOLOGY | Facility: CLINIC | Age: 84
End: 2024-12-02
Payer: MEDICARE

## 2024-12-02 ENCOUNTER — TELEPHONE (OUTPATIENT)
Dept: CARDIOLOGY | Facility: HOSPITAL | Age: 84
End: 2024-12-02
Payer: MEDICARE

## 2024-12-02 DIAGNOSIS — I50.30 DIASTOLIC HEART FAILURE, UNSPECIFIED HF CHRONICITY: Primary | ICD-10-CM

## 2024-12-02 NOTE — TELEPHONE ENCOUNTER
Copied from CRM #[id number]. Topic: Information Request - Get Appointment Information  >> Nov 29, 2024 11:17 AM Annamarie JORDAN wrote:   Patient called to speak with the office of Dr Preciado regard her replacement of her leaky valve?

## 2024-12-03 NOTE — TELEPHONE ENCOUNTER
Called & notified plan to proceed with [facility] in [facility]. Advised will call back with update once confirmation on date availability received.

## 2024-12-05 ENCOUNTER — APPOINTMENT (OUTPATIENT)
Dept: RADIOLOGY | Facility: HOSPITAL | Age: 84
End: 2024-12-05
Payer: MEDICARE

## 2024-12-05 ENCOUNTER — CLINICAL SUPPORT (OUTPATIENT)
Dept: EMERGENCY MEDICINE | Facility: HOSPITAL | Age: 84
End: 2024-12-05
Payer: MEDICARE

## 2024-12-05 ENCOUNTER — HOSPITAL ENCOUNTER (INPATIENT)
Facility: HOSPITAL | Age: 84
End: 2024-12-05
Attending: EMERGENCY MEDICINE | Admitting: INTERNAL MEDICINE
Payer: MEDICARE

## 2024-12-05 DIAGNOSIS — R06.02 SHORTNESS OF BREATH: Primary | ICD-10-CM

## 2024-12-05 DIAGNOSIS — R09.02 HYPOXIA: ICD-10-CM

## 2024-12-05 DIAGNOSIS — Z95.2 S/P TAVR (TRANSCATHETER AORTIC VALVE REPLACEMENT): ICD-10-CM

## 2024-12-05 DIAGNOSIS — I50.33 ACUTE ON CHRONIC DIASTOLIC CONGESTIVE HEART FAILURE: ICD-10-CM

## 2024-12-05 DIAGNOSIS — I35.0 AORTIC VALVE STENOSIS, ETIOLOGY OF CARDIAC VALVE DISEASE UNSPECIFIED: ICD-10-CM

## 2024-12-05 LAB
ALBUMIN SERPL BCP-MCNC: 4.7 G/DL (ref 3.4–5)
ALP SERPL-CCNC: 53 U/L (ref 33–136)
ALT SERPL W P-5'-P-CCNC: 15 U/L (ref 7–45)
ANION GAP SERPL CALC-SCNC: 15 MMOL/L (ref 10–20)
APTT PPP: 28 SECONDS (ref 27–38)
AST SERPL W P-5'-P-CCNC: 57 U/L (ref 9–39)
ATRIAL RATE: 89 BPM
BASOPHILS # BLD AUTO: 0.04 X10*3/UL (ref 0–0.1)
BASOPHILS NFR BLD AUTO: 0.5 %
BILIRUB SERPL-MCNC: 1.2 MG/DL (ref 0–1.2)
BNP SERPL-MCNC: 997 PG/ML (ref 0–99)
BUN SERPL-MCNC: 13 MG/DL (ref 6–23)
CALCIUM SERPL-MCNC: 9.1 MG/DL (ref 8.6–10.6)
CARDIAC TROPONIN I PNL SERPL HS: 117 NG/L (ref 0–34)
CARDIAC TROPONIN I PNL SERPL HS: 129 NG/L (ref 0–34)
CHLORIDE SERPL-SCNC: 100 MMOL/L (ref 98–107)
CO2 SERPL-SCNC: 23 MMOL/L (ref 21–32)
CREAT SERPL-MCNC: 0.87 MG/DL (ref 0.5–1.05)
EGFRCR SERPLBLD CKD-EPI 2021: 65 ML/MIN/1.73M*2
EOSINOPHIL # BLD AUTO: 0.25 X10*3/UL (ref 0–0.4)
EOSINOPHIL NFR BLD AUTO: 3.1 %
ERYTHROCYTE [DISTWIDTH] IN BLOOD BY AUTOMATED COUNT: 14 % (ref 11.5–14.5)
GLUCOSE SERPL-MCNC: 93 MG/DL (ref 74–99)
HCT VFR BLD AUTO: 37 % (ref 36–46)
HGB BLD-MCNC: 12.4 G/DL (ref 12–16)
HOLD SPECIMEN: NORMAL
IMM GRANULOCYTES # BLD AUTO: 0.02 X10*3/UL (ref 0–0.5)
IMM GRANULOCYTES NFR BLD AUTO: 0.3 % (ref 0–0.9)
INR PPP: 1 (ref 0.9–1.1)
LYMPHOCYTES # BLD AUTO: 0.91 X10*3/UL (ref 0.8–3)
LYMPHOCYTES NFR BLD AUTO: 11.4 %
MCH RBC QN AUTO: 28.2 PG (ref 26–34)
MCHC RBC AUTO-ENTMCNC: 33.5 G/DL (ref 32–36)
MCV RBC AUTO: 84 FL (ref 80–100)
MONOCYTES # BLD AUTO: 0.4 X10*3/UL (ref 0.05–0.8)
MONOCYTES NFR BLD AUTO: 5 %
NEUTROPHILS # BLD AUTO: 6.37 X10*3/UL (ref 1.6–5.5)
NEUTROPHILS NFR BLD AUTO: 79.7 %
NRBC BLD-RTO: 0 /100 WBCS (ref 0–0)
P AXIS: 67 DEGREES
P OFFSET: 193 MS
P ONSET: 138 MS
PLATELET # BLD AUTO: 220 X10*3/UL (ref 150–450)
POTASSIUM SERPL-SCNC: 4.1 MMOL/L (ref 3.5–5.3)
POTASSIUM SERPL-SCNC: 5.9 MMOL/L (ref 3.5–5.3)
PR INTERVAL: 164 MS
PROT SERPL-MCNC: 7.5 G/DL (ref 6.4–8.2)
PROTHROMBIN TIME: 11.5 SECONDS (ref 9.8–12.8)
Q ONSET: 220 MS
QRS COUNT: 14 BEATS
QRS DURATION: 90 MS
QT INTERVAL: 370 MS
QTC CALCULATION(BAZETT): 450 MS
QTC FREDERICIA: 421 MS
R AXIS: 106 DEGREES
RBC # BLD AUTO: 4.4 X10*6/UL (ref 4–5.2)
SODIUM SERPL-SCNC: 132 MMOL/L (ref 136–145)
T AXIS: 76 DEGREES
T OFFSET: 405 MS
VENTRICULAR RATE: 89 BPM
WBC # BLD AUTO: 8 X10*3/UL (ref 4.4–11.3)

## 2024-12-05 PROCEDURE — 93005 ELECTROCARDIOGRAM TRACING: CPT

## 2024-12-05 PROCEDURE — 84484 ASSAY OF TROPONIN QUANT: CPT | Performed by: EMERGENCY MEDICINE

## 2024-12-05 PROCEDURE — 84075 ASSAY ALKALINE PHOSPHATASE: CPT | Performed by: EMERGENCY MEDICINE

## 2024-12-05 PROCEDURE — 71046 X-RAY EXAM CHEST 2 VIEWS: CPT | Performed by: RADIOLOGY

## 2024-12-05 PROCEDURE — 36415 COLL VENOUS BLD VENIPUNCTURE: CPT

## 2024-12-05 PROCEDURE — 1200000002 HC GENERAL ROOM WITH TELEMETRY DAILY

## 2024-12-05 PROCEDURE — 84132 ASSAY OF SERUM POTASSIUM: CPT

## 2024-12-05 PROCEDURE — 99285 EMERGENCY DEPT VISIT HI MDM: CPT | Mod: 25 | Performed by: EMERGENCY MEDICINE

## 2024-12-05 PROCEDURE — 2500000004 HC RX 250 GENERAL PHARMACY W/ HCPCS (ALT 636 FOR OP/ED): Performed by: NURSE PRACTITIONER

## 2024-12-05 PROCEDURE — 71046 X-RAY EXAM CHEST 2 VIEWS: CPT

## 2024-12-05 PROCEDURE — 36415 COLL VENOUS BLD VENIPUNCTURE: CPT | Performed by: EMERGENCY MEDICINE

## 2024-12-05 PROCEDURE — 85025 COMPLETE CBC W/AUTO DIFF WBC: CPT | Performed by: EMERGENCY MEDICINE

## 2024-12-05 PROCEDURE — 83880 ASSAY OF NATRIURETIC PEPTIDE: CPT | Performed by: EMERGENCY MEDICINE

## 2024-12-05 PROCEDURE — 85730 THROMBOPLASTIN TIME PARTIAL: CPT | Performed by: EMERGENCY MEDICINE

## 2024-12-05 PROCEDURE — 85610 PROTHROMBIN TIME: CPT | Performed by: EMERGENCY MEDICINE

## 2024-12-05 RX ORDER — ATORVASTATIN CALCIUM 20 MG/1
20 TABLET, FILM COATED ORAL DAILY
Status: DISPENSED | OUTPATIENT
Start: 2024-12-06

## 2024-12-05 RX ORDER — FAMOTIDINE 20 MG/1
20 TABLET, FILM COATED ORAL DAILY
Status: DISPENSED | OUTPATIENT
Start: 2024-12-06

## 2024-12-05 RX ORDER — COLD-HOT PACK
1 EACH MISCELLANEOUS DAILY
Status: ON HOLD | COMMUNITY

## 2024-12-05 RX ORDER — POLYETHYLENE GLYCOL 3350 17 G/17G
17 POWDER, FOR SOLUTION ORAL DAILY
Status: DISPENSED | OUTPATIENT
Start: 2024-12-05

## 2024-12-05 RX ORDER — CHOLECALCIFEROL (VITAMIN D3) 25 MCG
5000 TABLET ORAL DAILY
Status: DISPENSED | OUTPATIENT
Start: 2024-12-06

## 2024-12-05 RX ORDER — ALBUTEROL SULFATE 0.83 MG/ML
1.25 SOLUTION RESPIRATORY (INHALATION) EVERY 6 HOURS PRN
Status: ACTIVE | OUTPATIENT
Start: 2024-12-05

## 2024-12-05 RX ORDER — FLUTICASONE PROPIONATE 50 MCG
1 SPRAY, SUSPENSION (ML) NASAL DAILY
Status: DISPENSED | OUTPATIENT
Start: 2024-12-05

## 2024-12-05 RX ORDER — ALBUTEROL SULFATE 1.25 MG/3ML
1.25 SOLUTION RESPIRATORY (INHALATION) EVERY 6 HOURS PRN
Status: ON HOLD | COMMUNITY

## 2024-12-05 RX ORDER — LOSARTAN POTASSIUM 50 MG/1
50 TABLET ORAL DAILY
Status: DISPENSED | OUTPATIENT
Start: 2024-12-06

## 2024-12-05 RX ORDER — FLUOXETINE HYDROCHLORIDE 20 MG/1
20 CAPSULE ORAL DAILY
Status: DISPENSED | OUTPATIENT
Start: 2024-12-06

## 2024-12-05 RX ORDER — NAPROXEN SODIUM 220 MG/1
81 TABLET, FILM COATED ORAL DAILY
Status: DISPENSED | OUTPATIENT
Start: 2024-12-06

## 2024-12-05 RX ORDER — FUROSEMIDE 10 MG/ML
20 INJECTION INTRAMUSCULAR; INTRAVENOUS ONCE
Status: COMPLETED | OUTPATIENT
Start: 2024-12-05 | End: 2024-12-05

## 2024-12-05 RX ADMIN — FUROSEMIDE 20 MG: 10 INJECTION, SOLUTION INTRAMUSCULAR; INTRAVENOUS at 15:02

## 2024-12-05 SDOH — ECONOMIC STABILITY: FOOD INSECURITY: WITHIN THE PAST 12 MONTHS, THE FOOD YOU BOUGHT JUST DIDN'T LAST AND YOU DIDN'T HAVE MONEY TO GET MORE.: NEVER TRUE

## 2024-12-05 SDOH — ECONOMIC STABILITY: FOOD INSECURITY: WITHIN THE PAST 12 MONTHS, YOU WORRIED THAT YOUR FOOD WOULD RUN OUT BEFORE YOU GOT THE MONEY TO BUY MORE.: NEVER TRUE

## 2024-12-05 SDOH — SOCIAL STABILITY: SOCIAL INSECURITY: ARE THERE ANY APPARENT SIGNS OF INJURIES/BEHAVIORS THAT COULD BE RELATED TO ABUSE/NEGLECT?: NO

## 2024-12-05 SDOH — SOCIAL STABILITY: SOCIAL INSECURITY: WITHIN THE LAST YEAR, HAVE YOU BEEN HUMILIATED OR EMOTIONALLY ABUSED IN OTHER WAYS BY YOUR PARTNER OR EX-PARTNER?: NO

## 2024-12-05 SDOH — SOCIAL STABILITY: SOCIAL INSECURITY: ABUSE: ADULT

## 2024-12-05 SDOH — SOCIAL STABILITY: SOCIAL INSECURITY: DO YOU FEEL ANYONE HAS EXPLOITED OR TAKEN ADVANTAGE OF YOU FINANCIALLY OR OF YOUR PERSONAL PROPERTY?: NO

## 2024-12-05 SDOH — SOCIAL STABILITY: SOCIAL INSECURITY: WERE YOU ABLE TO COMPLETE ALL THE BEHAVIORAL HEALTH SCREENINGS?: YES

## 2024-12-05 SDOH — SOCIAL STABILITY: SOCIAL INSECURITY: WITHIN THE LAST YEAR, HAVE YOU BEEN AFRAID OF YOUR PARTNER OR EX-PARTNER?: NO

## 2024-12-05 SDOH — ECONOMIC STABILITY: INCOME INSECURITY: IN THE PAST 12 MONTHS HAS THE ELECTRIC, GAS, OIL, OR WATER COMPANY THREATENED TO SHUT OFF SERVICES IN YOUR HOME?: NO

## 2024-12-05 SDOH — SOCIAL STABILITY: SOCIAL INSECURITY: DO YOU FEEL UNSAFE GOING BACK TO THE PLACE WHERE YOU ARE LIVING?: NO

## 2024-12-05 SDOH — SOCIAL STABILITY: SOCIAL INSECURITY: HAVE YOU HAD THOUGHTS OF HARMING ANYONE ELSE?: YES

## 2024-12-05 SDOH — SOCIAL STABILITY: SOCIAL INSECURITY: DOES ANYONE TRY TO KEEP YOU FROM HAVING/CONTACTING OTHER FRIENDS OR DOING THINGS OUTSIDE YOUR HOME?: NO

## 2024-12-05 SDOH — SOCIAL STABILITY: SOCIAL INSECURITY: ARE YOU OR HAVE YOU BEEN THREATENED OR ABUSED PHYSICALLY, EMOTIONALLY, OR SEXUALLY BY ANYONE?: NO

## 2024-12-05 SDOH — SOCIAL STABILITY: SOCIAL INSECURITY: HAS ANYONE EVER THREATENED TO HURT YOUR FAMILY OR YOUR PETS?: NO

## 2024-12-05 ASSESSMENT — COLUMBIA-SUICIDE SEVERITY RATING SCALE - C-SSRS
2. HAVE YOU ACTUALLY HAD ANY THOUGHTS OF KILLING YOURSELF?: NO
6. HAVE YOU EVER DONE ANYTHING, STARTED TO DO ANYTHING, OR PREPARED TO DO ANYTHING TO END YOUR LIFE?: NO
6. HAVE YOU EVER DONE ANYTHING, STARTED TO DO ANYTHING, OR PREPARED TO DO ANYTHING TO END YOUR LIFE?: NO
1. IN THE PAST MONTH, HAVE YOU WISHED YOU WERE DEAD OR WISHED YOU COULD GO TO SLEEP AND NOT WAKE UP?: NO
2. HAVE YOU ACTUALLY HAD ANY THOUGHTS OF KILLING YOURSELF?: NO
1. IN THE PAST MONTH, HAVE YOU WISHED YOU WERE DEAD OR WISHED YOU COULD GO TO SLEEP AND NOT WAKE UP?: NO

## 2024-12-05 ASSESSMENT — PAIN - FUNCTIONAL ASSESSMENT
PAIN_FUNCTIONAL_ASSESSMENT: 0-10

## 2024-12-05 ASSESSMENT — COGNITIVE AND FUNCTIONAL STATUS - GENERAL
MOBILITY SCORE: 24
DAILY ACTIVITIY SCORE: 24
PATIENT BASELINE BEDBOUND: NO

## 2024-12-05 ASSESSMENT — LIFESTYLE VARIABLES
HAVE PEOPLE ANNOYED YOU BY CRITICIZING YOUR DRINKING: NO
HOW MANY STANDARD DRINKS CONTAINING ALCOHOL DO YOU HAVE ON A TYPICAL DAY: PATIENT DOES NOT DRINK
TOTAL SCORE: 0
HOW OFTEN DO YOU HAVE 6 OR MORE DRINKS ON ONE OCCASION: NEVER
HAVE YOU EVER FELT YOU SHOULD CUT DOWN ON YOUR DRINKING: NO
HOW OFTEN DO YOU HAVE A DRINK CONTAINING ALCOHOL: NEVER
AUDIT-C TOTAL SCORE: 0
SKIP TO QUESTIONS 9-10: 1
EVER FELT BAD OR GUILTY ABOUT YOUR DRINKING: NO
EVER HAD A DRINK FIRST THING IN THE MORNING TO STEADY YOUR NERVES TO GET RID OF A HANGOVER: NO
AUDIT-C TOTAL SCORE: 0

## 2024-12-05 ASSESSMENT — ACTIVITIES OF DAILY LIVING (ADL)
ASSISTIVE_DEVICE: EYEGLASSES;OXYGEN
ADEQUATE_TO_COMPLETE_ADL: YES
TOILETING: INDEPENDENT
JUDGMENT_ADEQUATE_SAFELY_COMPLETE_DAILY_ACTIVITIES: YES
WALKS IN HOME: INDEPENDENT
HEARING - LEFT EAR: FUNCTIONAL
HEARING - RIGHT EAR: FUNCTIONAL
DRESSING YOURSELF: INDEPENDENT
PATIENT'S MEMORY ADEQUATE TO SAFELY COMPLETE DAILY ACTIVITIES?: YES
LACK_OF_TRANSPORTATION: NO
FEEDING YOURSELF: INDEPENDENT
BATHING: INDEPENDENT
GROOMING: INDEPENDENT

## 2024-12-05 ASSESSMENT — PAIN SCALES - GENERAL
PAINLEVEL_OUTOF10: 0 - NO PAIN

## 2024-12-05 ASSESSMENT — PATIENT HEALTH QUESTIONNAIRE - PHQ9
SUM OF ALL RESPONSES TO PHQ9 QUESTIONS 1 & 2: 0
1. LITTLE INTEREST OR PLEASURE IN DOING THINGS: NOT AT ALL
2. FEELING DOWN, DEPRESSED OR HOPELESS: NOT AT ALL

## 2024-12-05 NOTE — ED TRIAGE NOTES
Pt presents to ED with a c/o being seen two weeks ago in ED and then being scheduled for a valve replacement. Pt is c/o feeling SOB, worsening over the past 2 weeks with chest tightness. Pt is 88% on room air in triage, placed on 2 liters

## 2024-12-05 NOTE — PROGRESS NOTES
Pharmacy Medication History Review    Aure Hdez is a 84 y.o. female admitted for Shortness of breath. Pharmacy reviewed the patient's [id number]-[id number] medications and allergies for accuracy.    Medications ADDED:  Probiotic   Claritin   Albuterol Nebulizer Solution   Medications CHANGED:  N/A  Medications REMOVED/NO LONGER TAKING:   N/A     The list below reflects the updated PTA list.   Prior to Admission Medications   Prescriptions Last Dose Informant   FLUoxetine (PROzac) 20 mg capsule 12/5/2024 Morning Self   Sig: TAKE ONE CAPSULE BY MOUTH EVERY DAY   L.ac,bul,par,rha-B.ani,manny-inu (Probiotic Digest Supp, 6-strn,) 10 billion cell -100 mg capsule 12/5/2024 Morning Self   Sig: Take 1 tablet by mouth once daily.   albuterol 1.25 mg/3 mL nebulizer solution 12/4/2024 Evening Self   Sig: Take 3 mL (1.25 mg) by nebulization every 6 hours if needed for wheezing.   albuterol 90 mcg/actuation inhaler 12/4/2024 Evening Self   Sig: Inhale 2 puffs every 6 hours if needed for wheezing.   aspirin 81 mg chewable tablet 12/5/2024 Morning Self   Sig: Chew 1 tablet (81 mg) once daily.   atorvastatin (Lipitor) 20 mg tablet 12/5/2024 Morning Self   Sig: TAKE ONE TABLET BY MOUTH EVERY DAY   cholecalciferol (Vitamin D-3) 125 MCG (5000 UT) capsule 12/5/2024 Morning Self   Sig: Take 1 capsule (125 mcg) by mouth once daily.   clobetasol (Temovate) 0.05 % cream 12/4/2024 Evening Self   Sig: Apply to affected areas of thickened skin on the arms twice daily   famotidine (Pepcid) 20 mg tablet 12/5/2024 Morning Self   Sig: Take 1 tablet (20 mg) by mouth once daily.   fluticasone (Flonase Allergy Relief) 50 mcg/actuation nasal spray Past Month Self   Sig: Administer 1 spray into each nostril once daily. Shake gently. Before first use, prime pump. After use, clean tip and replace cap.   loratadine-pseudoephedrine (Claritin-D 24-hour)  mg 24 hr tablet 12/5/2024 Morning Self   Sig: Take 1 tablet by mouth once daily. Do not crush, chew,  "or split.   losartan (Cozaar) 50 mg tablet 2024 Morning Self   Sig: Take 1 tablet (50 mg) by mouth once daily.   nebulizers misc  Self   Si Device see administration instructions. Compressor and nebulizer kit (tubing and mouth piece)      Facility-Administered Medications: None        The list below reflects the updated allergy list. Please review each documented allergy for additional clarification and justification.  Allergies  Reviewed by Breanna Phoenix on 2024        Severity Reactions Comments    Lovastatin Not Specified Myalgia     Montelukast Not Specified Unknown Patient cannot remember reaction            Patient accepts M2B at discharge.     Sources:   [facility]  Pharmacy dispense history  Patient interview Good historian  Chart Review     Additional Comments:  Pt had a med list with her.       SHAVON MANCILLA [place]  Pharmacy Technician  24     Secure Chat preferred   If no response call [phone] or [employer] \"Med Rec\"   "

## 2024-12-05 NOTE — ED PROVIDER NOTES
HPI   Chief Complaint   Patient presents with    Shortness of Breath    Chest Pain       Pt is a ana maría 85 yo F with h/o CAD, LBBB, aortic stenosis s/p TAVR 03/16/2018, HTN, diastolic heart failure, mitral regurgitation, GERD, depression, SDH, and recent hospital admission found to have mitral and aortic valve regurg.  She was eventually discharged and the plan I believe was for her to be scheduled for a total aortic valve replacement again.  However she is coming in today with worsening symptoms.  She is with her  and daughter who are helping provide additional history.  She is short of breath even with short sentences and is having a lot of difficulty with even mild exertion at home.  She is not on oxygen at home but was mildly hypoxic down to 88% out in triage and is on 2 L of oxygen nasal cannula here in the emergency department satting in the low 90s.  She feels a little bit better on oxygen but is still very short of breath.  She is not on anticoagulation and does have a history of a subdural hematoma.  She has bilateral trace pitting edema in her lower extremities but denies any unilateral leg swelling and has no history of DVT or PE.  No infectious symptoms.                        [place] Coma Scale Score: 15                    Patient History   Past Medical History:   Diagnosis Date    Acute upper respiratory infection, unspecified 01/02/2019    Acute URI    Anosmia 12/11/2019    Loss of smell    Body mass index (BMI) 26.0-26.9, adult 11/30/2022    BMI 26.0-26.9,adult    Body mass index (BMI) 27.0-27.9, adult 11/02/2022    BMI 27.0-27.9,adult    Body mass index (BMI)30.0-30.9, adult 06/18/2020    BMI 30.0-30.9,adult    Cellulitis of external ear, unspecified ear 06/30/2014    Cellulitis of earlobe    Cellulitis of face 06/24/2015    Cellulitis, face    Cervicalgia 07/21/2021    Neck pain on right side    Depression, unspecified 01/03/2022    Controlled depression    Diarrhea, unspecified 11/21/2016     Diarrhea in adult patient    Encounter for immunization 10/19/2016    Need for prophylactic vaccination and inoculation against influenza    Fatty (change of) liver, not elsewhere classified 02/01/2018    Fatty (change of) liver, not elsewhere classified    Hordeolum externum unspecified eye, unspecified eyelid 06/24/2015    Stye    Mild intermittent asthma with (acute) exacerbation ([facility]) 01/02/2019    Mild intermittent asthma with acute exacerbation    Nausea with vomiting, unspecified 11/16/2016    Non-intractable vomiting with nausea, unspecified vomiting type    Nonrheumatic aortic (valve) stenosis 03/23/2018    Aortic stenosis, severe    Other chest pain 09/16/2022    Chest heaviness    Other conditions influencing health status 01/14/2019    History of cough    Other forms of angina pectoris 06/19/2020    Equivalent angina    Other specified abnormal findings of blood chemistry     Abnormal liver function test    Other specified disorders of Eustachian tube, unspecified ear 03/05/2015    Eustachian tube dysfunction    Other specified symptoms and signs involving the circulatory and respiratory systems 07/27/2017    Carotid bruit    Pain in left shoulder 01/09/2020    Chronic left shoulder pain    Pain in right leg 08/05/2020    Pain of right lower extremity    Parageusia 12/11/2019    Loss of taste    Personal history of diseases of the skin and subcutaneous tissue 10/15/2015    History of seborrheic dermatitis    Personal history of other diseases of the circulatory system 02/01/2018    History of hypertension    Personal history of other diseases of the circulatory system 06/11/2015    History of abnormal electrocardiography    Personal history of other diseases of the circulatory system 05/09/2018    History of essential hypertension    Personal history of other diseases of the digestive system     History of cholelithiasis    Personal history of other diseases of the digestive system 02/01/2018     History of hiatal hernia    Personal history of other diseases of the musculoskeletal system and connective tissue 03/23/2018    H/O arthritis    Personal history of other diseases of the nervous system and sense organs 02/11/2015    History of acute conjunctivitis    Personal history of other diseases of the nervous system and sense organs 01/23/2015    History of acute otitis media    Personal history of other diseases of the nervous system and sense organs     History of sleep apnea    Personal history of other diseases of the respiratory system 10/15/2015    History of allergic rhinitis    Personal history of other diseases of the respiratory system 10/15/2015    History of chronic sinusitis    Personal history of other diseases of the respiratory system 04/16/2015    History of acute sinusitis    Personal history of other diseases of the respiratory system 02/01/2018    History of lung disease    Personal history of other diseases of the respiratory system 04/24/2017    History of acute pharyngitis    Personal history of other diseases of the respiratory system 02/28/2017    History of acute bronchitis    Personal history of other diseases of the respiratory system     History of bronchitis    Personal history of other diseases of urinary system     History of bladder problems    Personal history of other drug therapy     History of postmenopausal hormone replacement therapy    Personal history of other drug therapy     COVID-19 vaccine series completed    Personal history of other endocrine, nutritional and metabolic disease 08/03/2015    History of hyperlipidemia    Personal history of other endocrine, nutritional and metabolic disease 11/16/2016    History of hypokalemia    Personal history of other endocrine, nutritional and metabolic disease 02/01/2018    History of hyperlipidemia    Personal history of other mental and behavioral disorders     History of mental disorder    Personal history of other  specified conditions 05/21/2015    History of fatigue    Personal history of other specified conditions 11/30/2022    History of dizziness    Personal history of other specified conditions 01/23/2014    History of urinary frequency    Personal history of other specified conditions 06/14/2021    History of insomnia    Personal history of other specified conditions     History of heartburn    Rash and other nonspecific skin eruption 02/13/2018    Rash    Rectal polyp     Rectal polyp    Right lower quadrant pain 11/16/2016    Abdominal pain, RLQ (right lower quadrant)    Stiffness of left shoulder, not elsewhere classified 01/09/2020    Stiff shoulder, left    Unspecified asthma, uncomplicated ([facility]) 01/14/2019    Asthma in adult without complication, unspecified asthma severity, unspecified whether persistent    Unspecified conjunctivitis 06/24/2015    Conjunctivitis of left eye    Urticaria, unspecified 02/29/2016    Hives of unknown origin    Vomiting, unspecified 11/15/2016    Dry heaves     Past Surgical History:   Procedure Laterality Date    BLADDER SURGERY  02/01/2018    Bladder Surgery    CARDIAC CATHETERIZATION N/A 11/22/2024    Procedure: Left Heart Cath;  Surgeon: Carl B Gillombardo, MD;  Location: [place] [address] Cardiac Cath Lab;  Service: Cardiovascular;  Laterality: N/A;    CHOLECYSTECTOMY  02/01/2018    Cholecystectomy    COLONOSCOPY  03/23/2018    Complete Colonoscopy    OTHER SURGICAL HISTORY  05/09/2018    Aortic Valve Replacement Transcatheter    TONSILLECTOMY  03/23/2018    Tonsillectomy    TOTAL VAGINAL HYSTERECTOMY  03/23/2018    Vaginal Hysterectomy     Family History   Problem Relation Name Age of Onset    Hypertension Mother      Stroke Mother      No Known Problems Father       Social History     Tobacco Use    Smoking status: Never    Smokeless tobacco: Never   Vaping Use    Vaping status: Never Used   Substance Use Topics    Alcohol use: Not Currently    Drug use: Never       Physical  "Exam   Visit Vitals  /60   Pulse 90   Temp 36.6 °C (97.8 °F) (Temporal)   Resp 16   Ht 1.626 m (5' 4\")   Wt 61.2 kg (135 lb)   SpO2 (!) 91%   BMI 23.17 kg/m²   OB Status Hysterectomy   Smoking Status Never   BSA 1.66 m²       Physical Exam  Vitals and nursing note reviewed.   Constitutional:       Appearance: She is well-developed. She is not toxic-appearing or diaphoretic.      Comments: Mild to moderate increased work of breathing but not in acute respiratory distress.  Not toxic appearing.   HENT:      Head: Normocephalic and atraumatic.   Eyes:      Conjunctiva/sclera: Conjunctivae normal.   Cardiovascular:      Rate and Rhythm: Regular rhythm. Tachycardia present.      Heart sounds: Murmur heard.      Comments: Patient has a very loud murmur to the right sternal border that is both systolic and diastolic consistent with aortic stenosis and regurgitation  Pulmonary:      Effort: Tachypnea and accessory muscle usage present.      Breath sounds: No wheezing.   Abdominal:      Palpations: Abdomen is soft.      Tenderness: There is no abdominal tenderness.   Musculoskeletal:         General: No swelling.      Cervical back: Neck supple.      Right lower leg: Edema present.      Left lower leg: Edema present.   Skin:     General: Skin is warm and dry.      Capillary Refill: Capillary refill takes less than 2 seconds.      Coloration: Skin is pale.   Neurological:      Mental Status: She is alert.   Psychiatric:         Mood and Affect: Mood normal.         Procedure  Procedures    Medical Decision Making  Patient is an 84-year-old female coming in with worsening shortness of breath with known valvular disease including mitral valve and aortic valve regurgitation with a past history of a total aortic valve replacement.  They were going to schedule another replacement and she was recently discharged from the hospital but she is coming back in with worsening symptoms with a new oxygen requirement.  Presentation " right now is not consistent with pneumonia versus infection versus pulmonary embolus versus trauma.  We are going to obtain an EKG, chest x-ray, and labs and I have already reached out to her cardiologist Dr. Greenfield letting him know that we will likely admit her.  Ideally she could be scheduled for a TAVR during her stay here at [facility].        ED Course & MDM     ED Course as of 12/06/24 1127   Thu Dec 05, 2024   1127 POCUS shows trace pericardial effusion with valvular regurg involving the mitral and aortic valves.  She also has bilateral large pleural effusions.  Will DC the 500 cc bolus of IV fluids.  Will hold off on diuretics as I do think she still looks a little bit dry and is likely third spacing.  She is hemodynamically stable now so we will hold off on IV fluids or diuresis.  No indication for emergent thoracentesis.  Plan to admit. [DM]   1228 XR chest 2 views  Chest x-ray showing worsening pulmonary edema with enlarging bilateral pleural effusions left greater than right, suspect secondary to volume overload with lower suspicion for pneumonia at this time.  Will defer emergent thoracentesis given patient's stability on 2 L nasal cannula. [KR]   1350 Troponin I, High Sensitivity (CMC)(!!): 129  Decreased from prior [KR]   1350 BNP(!): 997 [KR]   1354 POTASSIUM(!): 5.9 [DM]   1355 POTASSIUM(!): 5.9 [DM]      ED Course User Index  [DM] Joni Phelan MD  [KR] Yvette Anderson,          Diagnoses as of 12/06/24 1127   Shortness of breath   Aortic valve stenosis, etiology of cardiac valve disease unspecified   Hypoxia       ** Please excuse any errors in grammar or translation related to this dictation. Voice recognition software was utilized to prepare this document. **       Joni Phelan MD  [facility] Emergency Medicine      Joni Phelan MD  12/06/24 1128

## 2024-12-05 NOTE — TREATMENT PLAN
Aure Hdez is a 83 y.o. female here with Aortic valve disease s/p transcatheter aortic valve replacement (TAVR) in 03/16/2018. CAD, LBBB, HTN, diastolic heart failure, mitral regurgitation, GERD, depression, SDH 6/2024, and syncope.   Structural Heart team Evaluated patient for Valve and Valve TAVR.     Valve and Structural Heart Work Up:    - NYHA: III   - Frailty: 2/5        - EKG: NSR,  ms, QRS 80 ms.   - TTE 10/28: EF 56%, s/p TAVR with Evolut 26, severe aortic stenosis with AV gradients 80/41, Vmax 4.48, DI 0.3, moderate valvular regurgitation with P1/2T 158msec, Moderate MR. ASD with left to right shunt. Svevere pulmonary HTN w/ SPAP of 94mmHg.  Mobile echodensity in LVOT.   -SEN: did not tolerate probe down her throat.    - CT TAVR: done.    - Blood Cx X2 Pending   - [facility]: 11/22/2024  - dental clearance: regular dentist visits q6 months, no issues.   - STS Procedure Type: Isolated AVR   Perioperative OutcomeEstimate %   Operative Mortality6.52%   KCCQ/Walk done     Plan:  Plan to expedite Valve to Valve procedure.   Primary team continue to optimized ADHF.   Patient and primary team will be notified when a date and time was established.     We appreciate the ability to participate in the patient care.  Please page [phone] if further questions and concerns.     KatelinLAURA Celaya-CNP

## 2024-12-05 NOTE — H&P
History Of Present Illness:    Aure Hdez is a 84 y.o. female female, accompanied by her  and daughter, presents with worsening exertional shortness of breath that she said has been worsening since her discharge on 11/23. Patient states she cannot lay flat and cannot walk more than a few feet without being sob. She was unable to eat breakfast this morning without sob. She does report that since arriving and being placed on O2 her symptoms have improved. BNP is elevated 997 (500s last admission) trop 129, CXR Findings of worsening pulmonary edema with enlarging bilateral pulmonary effusions, lef ft-greater-than-right. Superimposed pneumonia can not be excluded in appropriate clinical context, with BLE edema +2, diminished lung sounds. BP elevated 170s on admit, improved to 150s. Will notify stuctural of hear admission and admit to [facility] for management of ADHF.      Her PMH is significant for CAD, LBBB, aortic stenosis s/p TAVR 03/16/2018, HTN, diastolic heart failure, mitral regurgitation, GERD, depression, SDH 6/2024, and syncope. The patient underwent TAVR on 03/16/2018 and has been followed by Dr. Preciado and Sweetie Dubon, APRN-CNP.      She states that she has been admitted to hospital on five separate occasions over the past several months. Patient admits to 3 syncopal episodes in the past year, the most recent in June which resulted in a hospitalization and subdural hematoma.      At the time ASA was reversed with DDAVP and patient was monitored in the ICU, and later discharged. Patient advised to hold ASA and follow up for serial head CTs which showed no evidence of acute hemorrhage and an interval decrease in the density of the small left frontal subdural hemorrhage on 8/14/24. She was then admitted to [facility] in early September with COVID pneumonia.      The patient was readmitted to hospital later that month with acute on chronic diastolic heart failure. On 09/11/2024, CXR revealed small  bilateral pleural effusions. Echocardiogram on 10/28/2024 demonstrated an EF of 56%, restrictive pattern of left ventricular diastolic filling, normal RV systolic function, a large pleural effusion, moderate mitral regurgitation, moderate aortic regurgitation, severely elevated pulmonary artery pressure, atrial septal defect and dilatation of the aortic root. Repeat CXR on 11/12/2024 revealed mild interstitial congestion with effusions and basilar atelectasis.      Most recently patient saw Dr. Greenfield on 11/18 who directed patient to be admitted for urgent evaluation of aortic insufficiency.  consulted for aortic insufficiency/stenosis. LHC completed as part of Ruth TAVR work-up which showed no significant CAD. Per  and Dr. Preciado, low concern for endocarditis at this time and did not recommend SEN imaging.  [facility] completed-> no significant CAD. Per , Ruth to be completed outpatient. BC X2 showed no growth at day 3, will trend till completion. Will be discussed at valve meeting and called with plan per structural. Neurosurgery consulted for heparin product recommendations iso recent SDH 6/2024. CTH completed 11/22 showed no subdural hematoma. Neurosurg cleared patient for anticoagulation.  Started on losartan 50mg daily, SB improve from 150s to 120s, feeling well.         Last Recorded Vitals:  Vitals:    12/05/24 1050 12/05/24 1100 12/05/24 1130 12/05/24 1200   BP:  153/60     BP Location:       Patient Position:       Pulse:  90     Resp:  16     Temp:       TempSrc:       SpO2: (!) 92% (!) 91% (!) 90% 94%   Weight:       Height:           Last Labs:  CBC - 12/5/2024: 11:17 AM  8.0 12.4 220    37.0      CMP - 12/5/2024: 11:17 AM  9.1 7.5 57 --- 1.2   4.8 4.7 15 53      PTT - 12/5/2024: 11:17 AM  1.0   11.5 28     Troponin I, High Sensitivity ([facility])   Date/Time Value Ref Range Status   12/05/2024 11:17  (HH) 0 - 34 ng/L Final   11/19/2024 12:37  (HH) 0 - 34 ng/L Final     Comment:     Previous result  verified on 11/19/2024 1229 on specimen/case [id number] called with component [facility] for procedure Troponin I, High Sensitivity, Initial with value 188 ng/L.   11/19/2024 11:21  (HH) 0 - 34 ng/L Final     BNP   Date/Time Value Ref Range Status   12/05/2024 11:17  (H) 0 - 99 pg/mL Final   11/19/2024 11:21  (H) 0 - 99 pg/mL Final     LDL Calculated   Date/Time Value Ref Range Status   02/08/2024 10:48 AM 63 <=99 mg/dL Final     Comment:                                 Near   Borderline      AGE      Desirable  Optimal    High     High     Very High     0-19 Y     0 - 109     ---    110-129   >/= 130     ----    20-24 Y     0 - 119     ---    120-159   >/= 160     ----      >24 Y     0 -  99   100-129  130-159   160-189     >/=190       VLDL   Date/Time Value Ref Range Status   02/08/2024 10:48 AM 15 0 - 40 mg/dL Final   07/15/2023 11:07 AM 17 0 - 40 mg/dL Final   01/31/2023 10:57 AM 20 0 - 40 mg/dL Final   09/01/2022 09:08 AM 21 0 - 40 mg/dL Final       Ejection Fractions:  EF   Date/Time Value Ref Range Status   10/28/2024 11:19 AM 56 %          Past Medical History:  She has a past medical history of Acute upper respiratory infection, unspecified (01/02/2019), Anosmia (12/11/2019), Body mass index (BMI) 26.0-26.9, adult (11/30/2022), Body mass index (BMI) 27.0-27.9, adult (11/02/2022), Body mass index (BMI)30.0-30.9, adult (06/18/2020), Cellulitis of external ear, unspecified ear (06/30/2014), Cellulitis of face (06/24/2015), Cervicalgia (07/21/2021), Depression, unspecified (01/03/2022), Diarrhea, unspecified (11/21/2016), Encounter for immunization (10/19/2016), Fatty (change of) liver, not elsewhere classified (02/01/2018), Hordeolum externum unspecified eye, unspecified eyelid (06/24/2015), Mild intermittent asthma with (acute) exacerbation ([facility]) (01/02/2019), Nausea with vomiting, unspecified (11/16/2016), Nonrheumatic aortic (valve) stenosis (03/23/2018), Other chest pain  (09/16/2022), Other conditions influencing health status (01/14/2019), Other forms of angina pectoris (06/19/2020), Other specified abnormal findings of blood chemistry, Other specified disorders of Eustachian tube, unspecified ear (03/05/2015), Other specified symptoms and signs involving the circulatory and respiratory systems (07/27/2017), Pain in left shoulder (01/09/2020), Pain in right leg (08/05/2020), Parageusia (12/11/2019), Personal history of diseases of the skin and subcutaneous tissue (10/15/2015), Personal history of other diseases of the circulatory system (02/01/2018), Personal history of other diseases of the circulatory system (06/11/2015), Personal history of other diseases of the circulatory system (05/09/2018), Personal history of other diseases of the digestive system, Personal history of other diseases of the digestive system (02/01/2018), Personal history of other diseases of the musculoskeletal system and connective tissue (03/23/2018), Personal history of other diseases of the nervous system and sense organs (02/11/2015), Personal history of other diseases of the nervous system and sense organs (01/23/2015), Personal history of other diseases of the nervous system and sense organs, Personal history of other diseases of the respiratory system (10/15/2015), Personal history of other diseases of the respiratory system (10/15/2015), Personal history of other diseases of the respiratory system (04/16/2015), Personal history of other diseases of the respiratory system (02/01/2018), Personal history of other diseases of the respiratory system (04/24/2017), Personal history of other diseases of the respiratory system (02/28/2017), Personal history of other diseases of the respiratory system, Personal history of other diseases of urinary system, Personal history of other drug therapy, Personal history of other drug therapy, Personal history of other endocrine, nutritional and metabolic disease  (08/03/2015), Personal history of other endocrine, nutritional and metabolic disease (11/16/2016), Personal history of other endocrine, nutritional and metabolic disease (02/01/2018), Personal history of other mental and behavioral disorders, Personal history of other specified conditions (05/21/2015), Personal history of other specified conditions (11/30/2022), Personal history of other specified conditions (01/23/2014), Personal history of other specified conditions (06/14/2021), Personal history of other specified conditions, Rash and other nonspecific skin eruption (02/13/2018), Rectal polyp, Right lower quadrant pain (11/16/2016), Stiffness of left shoulder, not elsewhere classified (01/09/2020), Unspecified asthma, uncomplicated ([facility]) (01/14/2019), Unspecified conjunctivitis (06/24/2015), Urticaria, unspecified (02/29/2016), and Vomiting, unspecified (11/15/2016).    Past Surgical History:  She has a past surgical history that includes Bladder surgery (02/01/2018); Colonoscopy (03/23/2018); Cholecystectomy (02/01/2018); Total vaginal hysterectomy (03/23/2018); Tonsillectomy (03/23/2018); Other surgical history (05/09/2018); and Cardiac catheterization (N/A, 11/22/2024).      Social History:  She reports that she has never smoked. She has never used smokeless tobacco. She reports that she does not currently use alcohol. She reports that she does not use drugs.    Family History:  Family History   Problem Relation Name Age of Onset    Hypertension Mother      Stroke Mother      No Known Problems Father          Allergies:  Lovastatin and Montelukast    Inpatient Medications:  Scheduled medications   Medication Dose Route Frequency    furosemide  20 mg intravenous Once     PRN medications   Medication     Continuous Medications   Medication Dose Last Rate     Outpatient Medications:  Current Outpatient Medications   Medication Instructions    albuterol 90 mcg/actuation inhaler 2 puffs, inhalation, Every 6  hours PRN    aspirin 81 mg, oral, Daily    atorvastatin (Lipitor) 20 mg tablet TAKE ONE TABLET BY MOUTH EVERY DAY    cholecalciferol (Vitamin D-3) 125 MCG (5000 UT) capsule 1 capsule, Daily    clobetasol (Temovate) 0.05 % cream Apply to affected areas of thickened skin on the arms twice daily    famotidine (PEPCID) 20 mg, Daily    FLUoxetine (PROzac) 20 mg capsule TAKE ONE CAPSULE BY MOUTH EVERY DAY    fluticasone (Flonase Allergy Relief) 50 mcg/actuation nasal spray 1 spray, Each Nostril, Daily, Shake gently. Before first use, prime pump. After use, clean tip and replace cap.    losartan (COZAAR) 50 mg, oral, Daily    nebulizers misc 1 Device, miscellaneous, See admin instructions, Compressor and nebulizer kit (tubing and mouth piece)       Physical Exam:  Physical Exam  Constitutional:       Appearance: Normal appearance.   Cardiovascular:      Rate and Rhythm: Normal rate and regular rhythm.      Pulses: Normal pulses.      Heart sounds: Normal heart sounds.   Pulmonary:      Effort: Pulmonary effort is normal.      Breath sounds: Normal breath sounds.   Abdominal:      General: Abdomen is flat.   Musculoskeletal:         General: Normal range of motion.      Cervical back: Normal range of motion.   Skin:     General: Skin is warm.      Capillary Refill: Capillary refill takes less than 2 seconds.   Neurological:      General: No focal deficit present.      Mental Status: She is alert and oriented to person, place, and time.   Psychiatric:         Mood and Affect: Mood normal.         Behavior: Behavior normal.           Assessment/Plan     Aure Hdez is an 84 year old female with a PMH of CAD, LBBB, aortic stenosis s/p TAVR 03/16/2018, HTN, diastolic heart failure, mitral regurgitation, GERD, depression, SDH, and syncope. The patient underwent TAVR on 03/16/2018 and has been followed by Dr. Preciado and Sweetie Dubon, APRN-CNP. Patient has been experiencing worsening shortness of breath which is what brought  her in today.  She states that she has been admitted to hospital on five separate occasions over the past several months, noting that prior to 1 admission she had had a syncopal episode. Patient is admitted to [facility] for structural heart evaluation.       Aortic insufficiency   Hx of Aortic Stenosis s/p TAVR 03/16/2018   - TTE 10/28 EF 56% Aortic Valve: There is a prosthetic aortic valve present. The aortic valve dimensionless index is 0.30. There is a [employer] transcatheter aortic valve replacement, with a 26 mm reported size. There is moderate lalo-prosthetic aortic valve regurgitation. There is moderate aortic valve regurgitation. The peak instantaneous gradient of the aortic valve is 80.2 mmHg. The mean gradient of the aortic valve is 41.7 mmHg. In comparison to the previous echocardiogram(s): Compared with study dated 4/19/2023, Degree of AI has increased, trans-AVR gradients are severely increased, RVSP is now severely increased. MR has increased. Mobile echodensity is again seen in the LVOT   - Dr. Preciado reviewed prior post- TAVR imaging and echodensity has been present since TAVR was placed; therefore, low suspicion for endocarditis and SEN deemed not necessary   - Consulted structural heart for further evaluation, following  - worked up last admission  had been planning for  Ruth TAVR to be done as outpatient, will work on expediting   - Continue home ASA 81 mg daily, atorvastatin 20 mg daily      Chronic Diastolic Heart Failure   - TTE 10/28 as above   -  ([phone])  - warm and wet on exam with +2 pitting edema of BLE   - admit CXR: CXR Findings of worsening pulmonary edema with enlarging bilateral pulmonary effusions, lef ft-greater-than-right. Superimposed pneumonia can not be excluded in appropriate clinical context,  - will give 20mg IV lasix and eval for response, more as indicated  - o/p cards had considered Jardiance, but was not started     HTN  - on admit SBP range 150-170s  - c/w home  losartan 50 mg daily today    Hx of Left frontal subdural hemorrhage, 6/24/24  - Admitted to [facility] for syncopal fall with a head injury  - follows with neurology at [facility]  - Initially ASA was held after discharge until 8/21/24 after serial CTs showed no evidence of acute hemorrhage. There was note of an interval decrease in the density of the small left frontal subdural hemorrhage.   - Was cleared for ASA 81mg daily on 8/21  - Per , patient requires [facility] for work-up. Due to recent SDH, neurosurgery consulted for recommendations regarding heparin products/ anticoagulation with [facility] and potential Ruth TAVR.   - CTH completed 11/22 this AM showed no subdural hematoma.  - Per neurology, patient is cleared for anticoagulation.      Depression  - continue home fluoxetine 20mg daily      Dispo: Pending [facility] today  DVT ppx: mechanical ppx  Code Status: Full Code, confirmed upon admission    Dispo: pending structural plan    To be staffed by attending in the AM      Miles Bender, APRN-CNP, DNP

## 2024-12-05 NOTE — Clinical Note
Sheath was removed in the right femoral artery. Site closed by Perclose. Perclose x 2. 14fr sentrant removed

## 2024-12-06 LAB
ALBUMIN SERPL BCP-MCNC: 3.9 G/DL (ref 3.4–5)
ANION GAP SERPL CALC-SCNC: 12 MMOL/L (ref 10–20)
BUN SERPL-MCNC: 14 MG/DL (ref 6–23)
CALCIUM SERPL-MCNC: 8.8 MG/DL (ref 8.6–10.6)
CHLORIDE SERPL-SCNC: 101 MMOL/L (ref 98–107)
CO2 SERPL-SCNC: 25 MMOL/L (ref 21–32)
CREAT SERPL-MCNC: 0.98 MG/DL (ref 0.5–1.05)
EGFRCR SERPLBLD CKD-EPI 2021: 56 ML/MIN/1.73M*2
ERYTHROCYTE [DISTWIDTH] IN BLOOD BY AUTOMATED COUNT: 14.2 % (ref 11.5–14.5)
GLUCOSE SERPL-MCNC: 76 MG/DL (ref 74–99)
HCT VFR BLD AUTO: 33.9 % (ref 36–46)
HGB BLD-MCNC: 10.7 G/DL (ref 12–16)
MAGNESIUM SERPL-MCNC: 2.1 MG/DL (ref 1.6–2.4)
MCH RBC QN AUTO: 27.6 PG (ref 26–34)
MCHC RBC AUTO-ENTMCNC: 31.6 G/DL (ref 32–36)
MCV RBC AUTO: 88 FL (ref 80–100)
NRBC BLD-RTO: 0 /100 WBCS (ref 0–0)
PHOSPHATE SERPL-MCNC: 4.1 MG/DL (ref 2.5–4.9)
PLATELET # BLD AUTO: 195 X10*3/UL (ref 150–450)
POTASSIUM SERPL-SCNC: 4 MMOL/L (ref 3.5–5.3)
RBC # BLD AUTO: 3.87 X10*6/UL (ref 4–5.2)
SODIUM SERPL-SCNC: 134 MMOL/L (ref 136–145)
WBC # BLD AUTO: 6.8 X10*3/UL (ref 4.4–11.3)

## 2024-12-06 PROCEDURE — 80069 RENAL FUNCTION PANEL: CPT | Performed by: NURSE PRACTITIONER

## 2024-12-06 PROCEDURE — 85027 COMPLETE CBC AUTOMATED: CPT | Performed by: NURSE PRACTITIONER

## 2024-12-06 PROCEDURE — 2500000004 HC RX 250 GENERAL PHARMACY W/ HCPCS (ALT 636 FOR OP/ED): Performed by: NURSE PRACTITIONER

## 2024-12-06 PROCEDURE — 83735 ASSAY OF MAGNESIUM: CPT | Performed by: NURSE PRACTITIONER

## 2024-12-06 PROCEDURE — 1200000002 HC GENERAL ROOM WITH TELEMETRY DAILY

## 2024-12-06 PROCEDURE — 2500000001 HC RX 250 WO HCPCS SELF ADMINISTERED DRUGS (ALT 637 FOR MEDICARE OP): Performed by: NURSE PRACTITIONER

## 2024-12-06 PROCEDURE — 36415 COLL VENOUS BLD VENIPUNCTURE: CPT | Performed by: NURSE PRACTITIONER

## 2024-12-06 RX ORDER — FUROSEMIDE 10 MG/ML
30 INJECTION INTRAMUSCULAR; INTRAVENOUS
Status: COMPLETED | OUTPATIENT
Start: 2024-12-06 | End: 2024-12-06

## 2024-12-06 RX ORDER — ONDANSETRON 4 MG/1
4 TABLET, ORALLY DISINTEGRATING ORAL EVERY 8 HOURS PRN
Status: ACTIVE | OUTPATIENT
Start: 2024-12-06

## 2024-12-06 RX ORDER — FUROSEMIDE 10 MG/ML
30 INJECTION INTRAMUSCULAR; INTRAVENOUS
Status: DISCONTINUED | OUTPATIENT
Start: 2024-12-06 | End: 2024-12-06

## 2024-12-06 RX ORDER — ONDANSETRON HYDROCHLORIDE 2 MG/ML
4 INJECTION, SOLUTION INTRAVENOUS EVERY 8 HOURS PRN
Status: ACTIVE | OUTPATIENT
Start: 2024-12-06

## 2024-12-06 RX ADMIN — ASPIRIN 81 MG 81 MG: 81 TABLET ORAL at 10:08

## 2024-12-06 RX ADMIN — LOSARTAN POTASSIUM 50 MG: 50 TABLET, FILM COATED ORAL at 10:08

## 2024-12-06 RX ADMIN — FLUOXETINE HYDROCHLORIDE 20 MG: 20 CAPSULE ORAL at 10:14

## 2024-12-06 RX ADMIN — POLYETHYLENE GLYCOL 3350 17 G: 17 POWDER, FOR SOLUTION ORAL at 10:08

## 2024-12-06 RX ADMIN — Medication 125 MCG: at 10:08

## 2024-12-06 RX ADMIN — FUROSEMIDE 30 MG: 10 INJECTION, SOLUTION INTRAVENOUS at 17:28

## 2024-12-06 RX ADMIN — FUROSEMIDE 30 MG: 10 INJECTION, SOLUTION INTRAVENOUS at 11:31

## 2024-12-06 RX ADMIN — FAMOTIDINE 20 MG: 20 TABLET ORAL at 10:14

## 2024-12-06 RX ADMIN — ATORVASTATIN CALCIUM 20 MG: 20 TABLET, FILM COATED ORAL at 10:08

## 2024-12-06 ASSESSMENT — COGNITIVE AND FUNCTIONAL STATUS - GENERAL
MOBILITY SCORE: 24
DAILY ACTIVITIY SCORE: 24

## 2024-12-06 ASSESSMENT — PAIN - FUNCTIONAL ASSESSMENT: PAIN_FUNCTIONAL_ASSESSMENT: 0-10

## 2024-12-06 ASSESSMENT — PAIN SCALES - GENERAL
PAINLEVEL_OUTOF10: 0 - NO PAIN
PAINLEVEL_OUTOF10: 0 - NO PAIN

## 2024-12-06 ASSESSMENT — ACTIVITIES OF DAILY LIVING (ADL): LACK_OF_TRANSPORTATION: NO

## 2024-12-06 NOTE — HOSPITAL COURSE
Aure Hdez is a 84 y.o. female female, accompanied by her  and daughter, presents with worsening exertional shortness of breath that she said has been worsening since her discharge on 11/23. Patient states she cannot lay flat and cannot walk more than a few feet without being sob. She was unable to eat breakfast this morning without sob. She does report that since arriving and being placed on O2 her symptoms have improved. BNP is elevated 997 (500s last admission) trop 129, CXR Findings of worsening pulmonary edema with enlarging bilateral pulmonary effusions, lef ft-greater-than-right. Superimposed pneumonia can not be excluded in appropriate clinical context, with BLE edema +2, diminished lung sounds. BP elevated 170s on admit, improved to 150s. Will notify stuctural of hear admission and admit to [facility] for management of ADHF.      Her PMH is significant for CAD, LBBB, aortic stenosis s/p TAVR 03/16/2018, HTN, diastolic heart failure, mitral regurgitation, GERD, depression, SDH 6/2024, and syncope. The patient underwent TAVR on 03/16/2018 and has been followed by Dr. Preciado and Sweetie Dubon, APRN-CNP.      She states that she has been admitted to hospital on five separate occasions over the past several months. Patient admits to 3 syncopal episodes in the past year, the most recent in June which resulted in a hospitalization and subdural hematoma.      At the time ASA was reversed with DDAVP and patient was monitored in the ICU, and later discharged. Patient advised to hold ASA and follow up for serial head CTs which showed no evidence of acute hemorrhage and an interval decrease in the density of the small left frontal subdural hemorrhage on 8/14/24. She was then admitted to [facility] in early September with COVID pneumonia.      The patient was readmitted to hospital later that month with acute on chronic diastolic heart failure. On 09/11/2024, CXR revealed small bilateral pleural effusions.  Echocardiogram on 10/28/2024 demonstrated an EF of 56%, restrictive pattern of left ventricular diastolic filling, normal RV systolic function, a large pleural effusion, moderate mitral regurgitation, moderate aortic regurgitation, severely elevated pulmonary artery pressure, atrial septal defect and dilatation of the aortic root. Repeat CXR on 11/12/2024 revealed mild interstitial congestion with effusions and basilar atelectasis.      Most recently patient saw Dr. Greenfield on 11/18 who directed patient to be admitted for urgent evaluation of aortic insufficiency.  consulted for aortic insufficiency/stenosis. [facility] completed as part of Ruth TAVR work-up which showed no significant CAD. Per  and Dr. Preciado, low concern for endocarditis at this time and did not recommend SEN imaging.  [facility] completed-> no significant CAD. Per , Ruth to be completed outpatient. BC X2 showed no growth at day 3, will trend till completion. Will be discussed at valve meeting and called with plan per structural. Neurosurgery consulted for heparin product recommendations iso recent SDH 6/2024. CTH completed 11/22 showed no subdural hematoma. Neurosurg cleared patient for anticoagulation.  Started on losartan 50mg daily, SB improve from 150s to 120s, feeling well.          Floor course:    Diuresed with IV lasix with improvement in symptoms, transitioned to PO Lasix.     Structural Heart team consulted for expedited Valve in Valve TAVR given decompensation. Taken for inpatient Ruth TAVR, Tuesday 12/10 ***    Patient denied having any other home going needs. Follow up appointments scheduled.     Upon review of medications, lab values, and vital signs. Pt was deemed stable for discharge in satisfactory condition.     Discharge weight: ## kg     More than 60 minutes were spent in coordinating patient discharge.

## 2024-12-06 NOTE — RESEARCH NOTES
SUBJECT ID:  [id number]     TAVR LOW RISK ADVERSE EVENT     ADVERSE EVENT TERM  acute heart failure     Describe the adverse event including relevant symptoms, clinical findings and underlying cause  presents with worsening exertional shortness of breath ,placed on o2, BNP is elevated 997 (500s last admission) trop 129, CXR Findings of worsening pulmonary edemaan to expedite Valve to Valve procedure. Primary team continue to optimized ADHF. she underwent  Ruth: TAVR in TAVR: Marie 20+2cc mm on Dec 11, 2024, Doing well clinically,  so discharge Dec12, 2024       Date of onset or first observation  5 dec 204     Specify Timeframe:  [] Pre-Implant  [] During Implant  [x] Post-Implant    Date of site's first knowledge of event  6 Dec2024     Seriousness  [] Not Serious  [x] Serious  If Serious, did the Adverse Event:  [] Lead to Death  [x] Lead to a serious deterioration in the health of the subject, as defined by:   [] A life-threatening illness or injury, or    [] A permanent impairment of a body structure or a body function, including chronic diseases, or    [x] In patient or prolonged existing hospitalization    [x] Medical or surgical intervention to prevent life-threatening illness or injury or permanent impairment to a body structure or body function   [] Lead to fetal distress, fetal death, a congential abnormality or birth defect including physical or mental impairment     Relationships    Relation to the TAVR valve  [x]Related []Probably Related []Possibly Related []Unlikely Related []Not Related []N/A    Relation to the SAVR valve  []Related []Probably Related []Possibly Related []Unlikely Related []Not Related [x]N/A    Relation to the TAVR Delivery Catheter System  []Related []Probably Related []Possibly Related []Unlikely Related [x]Not Related []N/A    Relation to the TAVR Loading System  []Related []Probably Related []Possibly Related []Unlikely Related [x]Not Related []N/A    Relation to the TAVR  implant procedure  []Related []Probably Related []Possibly Related []Unlikely Related [x]Not Related []N/A    Relation to the SAVR implant procedure  []Related []Probably Related []Possibly Related []Unlikely Related []Not Related [x]N/A    Treatment  [] None  [x] Medication, Specify  IV Lasix   [] Implantation of permanent pacemaker device   Date of Implant:      [] Catheter intervention on the TAVR or SAVR valve  Date of Implant:      [] Surgical intervention to repair or replace the TAVR or SAVR  Date of Implant:      [] Neurological evaluation   [] Surgical intervention other than to repair or replace the TAVR or SAVR, specify procedure     [] Transfusion or blood products used, specify     [] Percutaneous intervention  [] Coronary arteriography  [] Other, specify       Was the subject re-hospitalized as a result of this AE?  [x]YES  []NO    Outcome/Status  [] Ongoing  [x] Resolved, no sequelae  [] Resolved, permanent sequelae  [] Death  [] Unresolved at time of trial exit    DATE OF RESOLUTION  12 DE C 2024

## 2024-12-06 NOTE — PROGRESS NOTES
"Subjective Data:  - doing well with 20mg IV lasix  - will give 30mg IV bid today  - planning for inpatient rigo next week with structural      Objective Data:  Last Recorded Vitals:  Vitals:    12/05/24 1859 12/05/24 2330 12/06/24 0415 12/06/24 0743   BP: (!) 136/41 128/59 134/51 (!) 117/43   BP Location: Right arm Right arm Right arm    Patient Position: Sitting Sitting Sitting    Pulse: 87 80 83 76   Resp: 16 15 18 16   Temp: 36.5 °C (97.7 °F) 36.6 °C (97.9 °F) 36.2 °C (97.2 °F) 36.5 °C (97.7 °F)   TempSrc: Temporal Temporal Temporal Temporal   SpO2:  98% 96% 96%   Weight: 61.2 kg (134 lb 14.7 oz)  60.5 kg (133 lb 6.1 oz)    Height: 1.626 m (5' 4\")          Last Labs:  CBC - 12/6/2024:  7:16 AM  6.8 10.7 195    33.9      CMP - 12/6/2024:  7:16 AM  8.8 7.5 57 --- 1.2   4.1 3.9 15 53      PTT - 12/5/2024: 11:17 AM  1.0   11.5 28     TROPHS   Date/Time Value Ref Range Status   12/05/2024 11:17  0 - 34 ng/L Final   12/05/2024 11:17  0 - 34 ng/L Final   11/19/2024 12:37  0 - 34 ng/L Final     Comment:     Previous result verified on 11/19/2024 1229 on specimen/case [id number] called with component [facility] for procedure Troponin I, High Sensitivity, Initial with value 188 ng/L.     BNP   Date/Time Value Ref Range Status   12/05/2024 11:17  0 - 99 pg/mL Final   11/19/2024 11:21  0 - 99 pg/mL Final     LDLCALC   Date/Time Value Ref Range Status   02/08/2024 10:48 AM 63 <=99 mg/dL Final     Comment:                                 Near   Borderline      AGE      Desirable  Optimal    High     High     Very High     0-19 Y     0 - 109     ---    110-129   >/= 130     ----    20-24 Y     0 - 119     ---    120-159   >/= 160     ----      >24 Y     0 -  99   100-129  130-159   160-189     >/=190       VLDL   Date/Time Value Ref Range Status   02/08/2024 10:48 AM 15 0 - 40 mg/dL Final   07/15/2023 11:07 AM 17 0 - 40 mg/dL Final   01/31/2023 10:57 AM 20 0 - 40 mg/dL Final   09/01/2022 09:08 AM " 21 0 - 40 mg/dL Final      Last I/O:  I/O last 3 completed shifts:  In: - (0 mL/kg)   Out: 800 (13.2 mL/kg) [Urine:800 (0.4 mL/kg/hr)]  Weight: 60.5 kg     Past Cardiology Tests (Last 3 Years):  EKG:  ECG 12 lead 12/05/2024      ECG 12 lead 11/19/2024      ECG 12 Lead 11/18/2024    Echo:  Transesophageal Echo (SEN)       Transthoracic echo (TTE) complete 10/28/2024    Ejection Fractions:  EF   Date/Time Value Ref Range Status   10/28/2024 11:19 AM 56 %      Cath:  Cardiac Catheterization Procedure 11/22/2024    Stress Test:  No results found for this or any previous visit from the past 1095 days.    Cardiac Imaging:  No results found for this or any previous visit from the past 1095 days.      Inpatient Medications:  Scheduled medications   Medication Dose Route Frequency    aspirin  81 mg oral Daily    atorvastatin  20 mg oral Daily    cholecalciferol  5,000 Units oral Daily    famotidine  20 mg oral Daily    FLUoxetine  20 mg oral Daily    fluticasone  1 spray Each Nostril Daily    losartan  50 mg oral Daily    polyethylene glycol  17 g oral Daily     PRN medications   Medication    albuterol     Continuous Medications   Medication Dose Last Rate       Physical Exam:  Constitutional:       Appearance: Normal appearance.   Cardiovascular:      Rate and Rhythm: Normal rate and regular rhythm.      Pulses: Normal pulses.      Heart sounds: Normal heart sounds.   Pulmonary:      Effort: Pulmonary effort is normal.      Breath sounds: Normal breath sounds.   Abdominal:      General: Abdomen is flat.   Musculoskeletal:         General: Normal range of motion.      Cervical back: Normal range of motion.   Skin:     General: Skin is warm.      Capillary Refill: Capillary refill takes less than 2 seconds.   Neurological:      General: No focal deficit present.      Mental Status: She is alert and oriented to person, place, and time.   Psychiatric:         Mood and Affect: Mood normal.         Behavior: Behavior normal.       Assessment/Plan   Aure Hdez is an 84 year old female with a PMH of CAD, LBBB, aortic stenosis s/p TAVR 03/16/2018, HTN, diastolic heart failure, mitral regurgitation, GERD, depression, SDH, and syncope. The patient underwent TAVR on 03/16/2018 and has been followed by Dr. Preciado and Sweetie Dubon, APRN-CNP. Patient has been experiencing worsening shortness of breath which is what brought her in today.  She states that she has been admitted to hospital on five separate occasions over the past several months, noting that prior to 1 admission she had had a syncopal episode. Patient is admitted to [facility] for structural heart evaluation.       Aortic insufficiency   Hx of Aortic Stenosis s/p TAVR 03/16/2018   - TTE 10/28 EF 56% Aortic Valve: There is a prosthetic aortic valve present. The aortic valve dimensionless index is 0.30. There is a Medtronic transcatheter aortic valve replacement, with a 26 mm reported size. There is moderate lalo-prosthetic aortic valve regurgitation. There is moderate aortic valve regurgitation. The peak instantaneous gradient of the aortic valve is 80.2 mmHg. The mean gradient of the aortic valve is 41.7 mmHg. In comparison to the previous echocardiogram(s): Compared with study dated 4/19/2023, Degree of AI has increased, trans-AVR gradients are severely increased, RVSP is now severely increased. MR has increased. Mobile echodensity is again seen in the LVOT   - Dr. Preciado reviewed prior post- TAVR imaging and echodensity has been present since TAVR was placed; therefore, low suspicion for endocarditis and SEN deemed not necessary   - Consulted structural heart for further evaluation, following  - worked up last admission  had been planning for  Ruth TAVR to be done as outpatient, will work on expediting -> planning next week mid week  - Continue home ASA 81 mg daily, atorvastatin 20 mg daily      Chronic Diastolic Heart Failure   - TTE 10/28 as above   -  ([phone],[phone])  - warm and  wet on exam with +2 pitting edema of BLE   - admit CXR: CXR Findings of worsening pulmonary edema with enlarging bilateral pulmonary effusions, lef ft-greater-than-right. Superimposed pneumonia can not be excluded in appropriate clinical context,  - 20mg IV lasix on admission, feeling well around 800ml out  - will give 30mg BID lasix today and reassess tomorrow   - o/p cards had considered Jardiance, but was not started     HTN  - on admit SBP range 150-170s  - BP improved to 110s-130s  - c/w home losartan 50 mg daily      Hx of Left frontal subdural hemorrhage, 6/24/24  - Admitted to [facility] for syncopal fall with a head injury  - follows with neurology at [facility]  - Initially ASA was held after discharge until 8/21/24 after serial CTs showed no evidence of acute hemorrhage. There was note of an interval decrease in the density of the small left frontal subdural hemorrhage.   - Was cleared for ASA 81mg daily on 8/21  - Per , patient requires [facility] for work-up. Due to recent SDH, neurosurgery consulted for recommendations regarding heparin products/ anticoagulation with [facility] and potential Ruth TAVR.   - CTH completed 11/22 this AM showed no subdural hematoma.  - Per neurology, patient is cleared for anticoagulation.      Depression  - continue home fluoxetine 20mg daily      Dispo: Pending [facility] today  DVT ppx: mechanical ppx  Code Status: Full Code, confirmed upon admission     Dispo: pending structural plan    Code Status:  Full Code    Seen and discussed with Dr. Yohan Bender, APRN-CNP, DNP

## 2024-12-06 NOTE — PROGRESS NOTES
TCC met with patient at bedside to discuss discharge planning. Patient gets to and from medical appts by spouse (drives). Pharmacy- [facility] ([place]), DME- nebulizer, grab bars, pulse/O2. Patient feels safe- yes. Current discharge recommendations are pending; TCC to follow for discharge planning.     12/06/24 4399   Discharge Planning   Living Arrangements Spouse/significant other   Support Systems Spouse/significant other   Assistance Needed TBD   Type of Residence Private residence   Number of Stairs to Enter Residence 2   Number of Stairs Within Residence 0   Do you have animals or pets at home? Yes   Type of Animals or Pets 1- Cat   Who is requesting discharge planning? Provider   Home or Post Acute Services None   Expected Discharge Disposition Home   Does the patient need discharge transport arranged? No   Financial Resource Strain   How hard is it for you to pay for the very basics like food, housing, medical care, and heating? Not hard   Housing Stability   In the last 12 months, was there a time when you were not able to pay the mortgage or rent on time? N   At any time in the past 12 months, were you homeless or living in a shelter (including now)? N   Transportation Needs   In the past 12 months, has lack of transportation kept you from medical appointments or from getting medications? no   In the past 12 months, has lack of transportation kept you from meetings, work, or from getting things needed for daily living? No       ARIELLE David, RN  [facility], [facility]  Transitional Care Coordinator, Mon-Fri  Cell: [phone], Office: [phone]  Email: [email or web address]

## 2024-12-07 LAB
ALBUMIN SERPL BCP-MCNC: 4.2 G/DL (ref 3.4–5)
ANION GAP SERPL CALC-SCNC: 13 MMOL/L (ref 10–20)
BUN SERPL-MCNC: 17 MG/DL (ref 6–23)
CALCIUM SERPL-MCNC: 9 MG/DL (ref 8.6–10.6)
CHLORIDE SERPL-SCNC: 97 MMOL/L (ref 98–107)
CO2 SERPL-SCNC: 27 MMOL/L (ref 21–32)
CREAT SERPL-MCNC: 1.07 MG/DL (ref 0.5–1.05)
EGFRCR SERPLBLD CKD-EPI 2021: 51 ML/MIN/1.73M*2
ERYTHROCYTE [DISTWIDTH] IN BLOOD BY AUTOMATED COUNT: 14 % (ref 11.5–14.5)
GLUCOSE SERPL-MCNC: 77 MG/DL (ref 74–99)
HCT VFR BLD AUTO: 33.5 % (ref 36–46)
HGB BLD-MCNC: 11 G/DL (ref 12–16)
MAGNESIUM SERPL-MCNC: 1.92 MG/DL (ref 1.6–2.4)
MCH RBC QN AUTO: 28.4 PG (ref 26–34)
MCHC RBC AUTO-ENTMCNC: 32.8 G/DL (ref 32–36)
MCV RBC AUTO: 86 FL (ref 80–100)
NRBC BLD-RTO: 0 /100 WBCS (ref 0–0)
PHOSPHATE SERPL-MCNC: 4.3 MG/DL (ref 2.5–4.9)
PLATELET # BLD AUTO: 213 X10*3/UL (ref 150–450)
POTASSIUM SERPL-SCNC: 3.2 MMOL/L (ref 3.5–5.3)
RBC # BLD AUTO: 3.88 X10*6/UL (ref 4–5.2)
SODIUM SERPL-SCNC: 134 MMOL/L (ref 136–145)
WBC # BLD AUTO: 7.6 X10*3/UL (ref 4.4–11.3)

## 2024-12-07 PROCEDURE — 2500000002 HC RX 250 W HCPCS SELF ADMINISTERED DRUGS (ALT 637 FOR MEDICARE OP, ALT 636 FOR OP/ED)

## 2024-12-07 PROCEDURE — 36415 COLL VENOUS BLD VENIPUNCTURE: CPT | Performed by: NURSE PRACTITIONER

## 2024-12-07 PROCEDURE — 2500000005 HC RX 250 GENERAL PHARMACY W/O HCPCS

## 2024-12-07 PROCEDURE — 83735 ASSAY OF MAGNESIUM: CPT | Performed by: NURSE PRACTITIONER

## 2024-12-07 PROCEDURE — 2500000001 HC RX 250 WO HCPCS SELF ADMINISTERED DRUGS (ALT 637 FOR MEDICARE OP)

## 2024-12-07 PROCEDURE — 99222 1ST HOSP IP/OBS MODERATE 55: CPT | Performed by: INTERNAL MEDICINE

## 2024-12-07 PROCEDURE — 1200000002 HC GENERAL ROOM WITH TELEMETRY DAILY

## 2024-12-07 PROCEDURE — 2500000001 HC RX 250 WO HCPCS SELF ADMINISTERED DRUGS (ALT 637 FOR MEDICARE OP): Performed by: NURSE PRACTITIONER

## 2024-12-07 PROCEDURE — 85027 COMPLETE CBC AUTOMATED: CPT | Performed by: NURSE PRACTITIONER

## 2024-12-07 PROCEDURE — 80069 RENAL FUNCTION PANEL: CPT | Performed by: NURSE PRACTITIONER

## 2024-12-07 RX ORDER — TALC
3 POWDER (GRAM) TOPICAL NIGHTLY PRN
Status: DISPENSED | OUTPATIENT
Start: 2024-12-07

## 2024-12-07 RX ORDER — FUROSEMIDE 40 MG/1
40 TABLET ORAL DAILY
Status: DISPENSED | OUTPATIENT
Start: 2024-12-07

## 2024-12-07 RX ORDER — POTASSIUM CHLORIDE 20 MEQ/1
40 TABLET, EXTENDED RELEASE ORAL EVERY 4 HOURS
Status: COMPLETED | OUTPATIENT
Start: 2024-12-07 | End: 2024-12-07

## 2024-12-07 RX ADMIN — FUROSEMIDE 40 MG: 40 TABLET ORAL at 11:40

## 2024-12-07 RX ADMIN — ASPIRIN 81 MG 81 MG: 81 TABLET ORAL at 08:51

## 2024-12-07 RX ADMIN — LOSARTAN POTASSIUM 50 MG: 50 TABLET, FILM COATED ORAL at 08:51

## 2024-12-07 RX ADMIN — FAMOTIDINE 20 MG: 20 TABLET ORAL at 08:51

## 2024-12-07 RX ADMIN — POTASSIUM CHLORIDE 40 MEQ: 1500 TABLET, EXTENDED RELEASE ORAL at 17:10

## 2024-12-07 RX ADMIN — Medication 125 MCG: at 08:49

## 2024-12-07 RX ADMIN — FLUOXETINE HYDROCHLORIDE 20 MG: 20 CAPSULE ORAL at 08:51

## 2024-12-07 RX ADMIN — ATORVASTATIN CALCIUM 20 MG: 20 TABLET, FILM COATED ORAL at 08:49

## 2024-12-07 RX ADMIN — Medication 3 MG: at 21:09

## 2024-12-07 RX ADMIN — POTASSIUM CHLORIDE 40 MEQ: 1500 TABLET, EXTENDED RELEASE ORAL at 13:10

## 2024-12-07 ASSESSMENT — COGNITIVE AND FUNCTIONAL STATUS - GENERAL
MOBILITY SCORE: 24
DAILY ACTIVITIY SCORE: 24

## 2024-12-07 ASSESSMENT — PAIN SCALES - GENERAL: PAINLEVEL_OUTOF10: 0 - NO PAIN

## 2024-12-07 ASSESSMENT — PAIN - FUNCTIONAL ASSESSMENT: PAIN_FUNCTIONAL_ASSESSMENT: 0-10

## 2024-12-07 NOTE — PROGRESS NOTES
Interval Events:  No acute events overnight.     Subjective Data:  Patient seen and assessed this morning, lying in bed, NAD. Denies any CP or SOB. Weaned to Ra this morning, reports feeling much better since admission. Updated on plan of care, agreeable to plan.     Today's Plan/Updates:   - start PO Lasix 40 mg daily  - planning for inpatient Ruth TAVR, Tuesday 12/10      Objective Data:  Last Recorded Vitals:  Vitals:    12/06/24 2350 12/07/24 0417 12/07/24 0834 12/07/24 1132   BP: 138/56 123/53 137/58 145/53   BP Location: Right arm Right arm Right arm Right arm   Patient Position: Lying Lying Lying Sitting   Pulse: 88 87 85 84   Resp: 18 18 18 18   Temp: 36.2 °C (97.2 °F) 36.3 °C (97.3 °F) 36.1 °C (97 °F) 36.5 °C (97.7 °F)   TempSrc: Temporal Temporal Temporal Temporal   SpO2: 93% 93% 92% 97%   Weight:  59.2 kg (130 lb 8.2 oz)     Height:           Last Labs:  Results for orders placed or performed during the hospital encounter of 12/05/24 (from the past 24 hours)   CBC   Result Value Ref Range    WBC 7.6 4.4 - 11.3 x10*3/uL    nRBC 0.0 0.0 - 0.0 /100 WBCs    RBC 3.88 (L) 4.00 - 5.20 x10*6/uL    Hemoglobin 11.0 (L) 12.0 - 16.0 g/dL    Hematocrit 33.5 (L) 36.0 - 46.0 %    MCV 86 80 - 100 fL    MCH 28.4 26.0 - 34.0 pg    MCHC 32.8 32.0 - 36.0 g/dL    RDW 14.0 11.5 - 14.5 %    Platelets 213 150 - 450 x10*3/uL   Renal Function Panel   Result Value Ref Range    Glucose 77 74 - 99 mg/dL    Sodium 134 (L) 136 - 145 mmol/L    Potassium 3.2 (L) 3.5 - 5.3 mmol/L    Chloride 97 (L) 98 - 107 mmol/L    Bicarbonate 27 21 - 32 mmol/L    Anion Gap 13 10 - 20 mmol/L    Urea Nitrogen 17 6 - 23 mg/dL    Creatinine 1.07 (H) 0.50 - 1.05 mg/dL    eGFR 51 (L) >60 mL/min/1.73m*2    Calcium 9.0 8.6 - 10.6 mg/dL    Phosphorus 4.3 2.5 - 4.9 mg/dL    Albumin 4.2 3.4 - 5.0 g/dL   Magnesium   Result Value Ref Range    Magnesium 1.92 1.60 - 2.40 mg/dL        TROPHS   Date/Time Value Ref Range Status   12/05/2024 11:17  0 - 34 ng/L Final    12/05/2024 11:17  0 - 34 ng/L Final   11/19/2024 12:37  0 - 34 ng/L Final     Comment:     Previous result verified on 11/19/2024 1229 on specimen/case [id number] called with component [facility] for procedure Troponin I, High Sensitivity, Initial with value 188 ng/L.     BNP   Date/Time Value Ref Range Status   12/05/2024 11:17  0 - 99 pg/mL Final   11/19/2024 11:21  0 - 99 pg/mL Final     LDLCALC   Date/Time Value Ref Range Status   02/08/2024 10:48 AM 63 <=99 mg/dL Final     Comment:                                 Near   Borderline      AGE      Desirable  Optimal    High     High     Very High     0-19 Y     0 - 109     ---    110-129   >/= 130     ----    20-24 Y     0 - 119     ---    120-159   >/= 160     ----      >24 Y     0 -  99   100-129  130-159   160-189     >/=190       VLDL   Date/Time Value Ref Range Status   02/08/2024 10:48 AM 15 0 - 40 mg/dL Final   07/15/2023 11:07 AM 17 0 - 40 mg/dL Final   01/31/2023 10:57 AM 20 0 - 40 mg/dL Final   09/01/2022 09:08 AM 21 0 - 40 mg/dL Final      Last I/O:  I/O last 3 completed shifts:  In: - (0 mL/kg)   Out: 1550 (26.2 mL/kg) [Urine:1550 (0.7 mL/kg/hr)]  Weight: 59.2 kg     Past Cardiology Tests (Last 3 Years):  EKG:  ECG 12 lead 12/05/2024  Normal sinus rhythm with sinus arrhythmia  Rightward axis  Anterior infarct (cited on or before 19-NOV-2024)  Abnormal ECG  When compared with ECG of 19-NOV-2024 12:39,  QRS axis Shifted right     See ED provider note for full interpretation and clinical correlation  Confirmed by Ni Serrano ([id number]) on 12/5/2024 11:51:34 PM    Echo:  Transesophageal Echo (SEN)   CONCLUSIONS:   1. Unable to insert SEN probe.    Transthoracic echo (TTE) complete 10/28/2024     CONCLUSIONS:   1. The left ventricular systolic function is normal, with a Lee's biplane calculated ejection fraction of 56%.   2. Spectral Doppler shows a restrictive pattern of left ventricular diastolic filling.   3. There is normal  right ventricular global systolic function.   4. The left atrium is moderately dilated.   5. There is a large pleural effusion.   6. The mitral valve is moderately thickened.   7. There is moderate mitral annular calcification.   8. Moderate mitral valve regurgitation.   9. There is a Medtronic transcatheter aortic valve replacement, with a 26mm reported size.  10. Moderate aortic valve regurgitation.  11. Severely elevated pulmonary artery pressure.  12. There is a moderately sized atrial septal defect.  13. There is moderate dilatation of the aortic root.    Ejection Fractions:  EF   Date/Time Value Ref Range Status   10/28/2024 11:19 AM 56 %      Cath:  Cardiac Catheterization Procedure 11/22/2024  Recommendations:  Maximize medical therapy.  Agressive risk factor modification efforts.  Patient counseled and advised to discontinue smoking.  Medical management of coronary artery disease.     ____________________________________________________________________________________  CONCLUSIONS:   1. Right dominant coronary circulation with non-obstructive CAD, as described above.   2. LVEDP 12 mm Hg.    Inpatient Medications:  Scheduled medications   Medication Dose Route Frequency    aspirin  81 mg oral Daily    atorvastatin  20 mg oral Daily    cholecalciferol  5,000 Units oral Daily    famotidine  20 mg oral Daily    FLUoxetine  20 mg oral Daily    fluticasone  1 spray Each Nostril Daily    furosemide  40 mg oral Daily    losartan  50 mg oral Daily    polyethylene glycol  17 g oral Daily    potassium chloride CR  40 mEq oral q4h     PRN medications   Medication    albuterol    melatonin    ondansetron ODT    Or    ondansetron     Continuous Medications   Medication Dose Last Rate       Physical Exam:  Constitutional:       Appearance: Normal appearance.   Cardiovascular:      Rate and Rhythm: Normal rate and regular rhythm.      Pulses: Normal pulses.      Heart sounds: Normal heart sounds.   Pulmonary:      Effort:  Pulmonary effort is normal.      Breath sounds: Normal breath sounds.   Abdominal:      General: Abdomen is flat.   Musculoskeletal:         General: Normal range of motion.      Cervical back: Normal range of motion.   Skin:     General: Skin is warm.      Capillary Refill: Capillary refill takes less than 2 seconds.   Neurological:      General: No focal deficit present.      Mental Status: She is alert and oriented to person, place, and time.   Psychiatric:         Mood and Affect: Mood normal.         Behavior: Behavior normal.      Assessment/Plan   Aure Hdez is an 84 year old female with a PMH of CAD, LBBB, aortic stenosis s/p TAVR 03/16/2018, HTN, diastolic heart failure, mitral regurgitation, GERD, depression, SDH, and syncope. The patient underwent TAVR on 03/16/2018 and has been followed by Dr. Preciado and LAURA Duffy-CNP. Patient has been experiencing worsening shortness of breath which is what brought her in today.  She states that she has been admitted to hospital on five separate occasions over the past several months, noting that prior to 1 admission she had had a syncopal episode. Patient is admitted to [facility] for structural heart evaluation.       Aortic insufficiency   Hx of Aortic Stenosis s/p TAVR 03/16/2018   - TTE 10/28 EF 56% Aortic Valve: There is a prosthetic aortic valve present. The aortic valve dimensionless index is 0.30. There is a Medtronic transcatheter aortic valve replacement, with a 26 mm reported size. There is moderate lalo-prosthetic aortic valve regurgitation. There is moderate aortic valve regurgitation. The peak instantaneous gradient of the aortic valve is 80.2 mmHg. The mean gradient of the aortic valve is 41.7 mmHg. In comparison to the previous echocardiogram(s): Compared with study dated 4/19/2023, Degree of AI has increased, trans-AVR gradients are severely increased, RVSP is now severely increased. MR has increased. Mobile echodensity is again seen in the LVOT    - Dr. Preciado reviewed prior post- TAVR imaging and echodensity has been present since TAVR was placed; therefore, low suspicion for endocarditis and SEN deemed not necessary   - Consulted structural heart for further evaluation, following  - worked up last admission had been planning for Ruth TAVR to be done as outpatient  - plan now for inpatient Ruth TAVR, Tuesday 12/10   - Continue home ASA 81 mg daily, atorvastatin 20 mg daily      Chronic Diastolic Heart Failure   - TTE 10/28 as above   -  (596,1151)  - warm and wet on exam with +2 pitting edema of BLE   - admit CXR: CXR Findings of worsening pulmonary edema with enlarging bilateral pulmonary effusions, lef ft-greater-than-right. Superimposed pneumonia can not be excluded in appropriate clinical context,  - appears near euvolemic, weaned to RA this morning  - start PO Lasix 40 mg daily  - o/p cards had considered Jardiance, but was not started, consider following TAVR     HTN  - on admit SBP range 150-170s  - SBP last 24 hrs: 109-142  - c/w home losartan 50 mg daily      Hx of Left frontal subdural hemorrhage, 6/24/24  - Admitted to [facility] for syncopal fall with a head injury  - follows with neurology at [facility]  - Initially ASA was held after discharge until 8/21/24 after serial CTs showed no evidence of acute hemorrhage. There was note of an interval decrease in the density of the small left frontal subdural hemorrhage.   - Was cleared for ASA 81mg daily on 8/21  - Per , patient requires [facility] for work-up. Due to recent SDH, neurosurgery consulted for recommendations regarding heparin products/ anticoagulation with [facility] and potential Ruth TAVR.   - CTH completed 11/22 showed no subdural hematoma.  - Per neurology, patient is cleared for anticoagulation.      Depression  - continue home fluoxetine 20mg daily      Dispo:   Pending Ruth TAVR 10/12    DVT ppx: SCDs    Code Status: Full Code   NOK: Geeta Juarez, daughter:  [phone]    All labs, vital signs, tests & imaging results, and medications were reviewed.    Seen and discussed with Dr. Maile Frazier, APRN-CNP

## 2024-12-07 NOTE — CARE PLAN
Patient safe and stable throughout shift. Ambulated on unit. Satting well on RA. Diuretics transitioned to oral. Plan for TAVR on Tuesday.       Problem: Fall/Injury  Goal: Not fall by end of shift  Outcome: Progressing  Goal: Be free from injury by end of the shift  Outcome: Progressing  Goal: Verbalize understanding of personal risk factors for fall in the hospital  Outcome: Progressing  Goal: Verbalize understanding of risk factor reduction measures to prevent injury from fall in the home  Outcome: Progressing  Goal: Use assistive devices by end of the shift  Outcome: Progressing  Goal: Pace activities to prevent fatigue by end of the shift  Outcome: Progressing     Problem: Pain - Adult  Goal: Verbalizes/displays adequate comfort level or baseline comfort level  Outcome: Progressing     Problem: Safety - Adult  Goal: Free from fall injury  Outcome: Progressing     Problem: Discharge Planning  Goal: Discharge to home or other facility with appropriate resources  Outcome: Progressing     Problem: Chronic Conditions and Co-morbidities  Goal: Patient's chronic conditions and co-morbidity symptoms are monitored and maintained or improved  Outcome: Progressing

## 2024-12-08 VITALS
SYSTOLIC BLOOD PRESSURE: 128 MMHG | DIASTOLIC BLOOD PRESSURE: 43 MMHG | OXYGEN SATURATION: 97 % | TEMPERATURE: 97.3 F | BODY MASS INDEX: 22.34 KG/M2 | HEIGHT: 64 IN | HEART RATE: 89 BPM | RESPIRATION RATE: 18 BRPM | WEIGHT: 130.84 LBS

## 2024-12-08 LAB
ALBUMIN SERPL BCP-MCNC: 3.7 G/DL (ref 3.4–5)
ANION GAP SERPL CALC-SCNC: 13 MMOL/L (ref 10–20)
BUN SERPL-MCNC: 16 MG/DL (ref 6–23)
CALCIUM SERPL-MCNC: 8.9 MG/DL (ref 8.6–10.6)
CHLORIDE SERPL-SCNC: 103 MMOL/L (ref 98–107)
CO2 SERPL-SCNC: 26 MMOL/L (ref 21–32)
CREAT SERPL-MCNC: 1.07 MG/DL (ref 0.5–1.05)
EGFRCR SERPLBLD CKD-EPI 2021: 51 ML/MIN/1.73M*2
ERYTHROCYTE [DISTWIDTH] IN BLOOD BY AUTOMATED COUNT: 14.2 % (ref 11.5–14.5)
GLUCOSE SERPL-MCNC: 76 MG/DL (ref 74–99)
HCT VFR BLD AUTO: 33.2 % (ref 36–46)
HGB BLD-MCNC: 10.6 G/DL (ref 12–16)
MAGNESIUM SERPL-MCNC: 2.06 MG/DL (ref 1.6–2.4)
MCH RBC QN AUTO: 28.3 PG (ref 26–34)
MCHC RBC AUTO-ENTMCNC: 31.9 G/DL (ref 32–36)
MCV RBC AUTO: 89 FL (ref 80–100)
NRBC BLD-RTO: 0 /100 WBCS (ref 0–0)
PHOSPHATE SERPL-MCNC: 4.1 MG/DL (ref 2.5–4.9)
PLATELET # BLD AUTO: 199 X10*3/UL (ref 150–450)
POTASSIUM SERPL-SCNC: 4.3 MMOL/L (ref 3.5–5.3)
RBC # BLD AUTO: 3.75 X10*6/UL (ref 4–5.2)
SODIUM SERPL-SCNC: 138 MMOL/L (ref 136–145)
WBC # BLD AUTO: 6.9 X10*3/UL (ref 4.4–11.3)

## 2024-12-08 PROCEDURE — 84100 ASSAY OF PHOSPHORUS: CPT | Performed by: NURSE PRACTITIONER

## 2024-12-08 PROCEDURE — 36415 COLL VENOUS BLD VENIPUNCTURE: CPT | Performed by: NURSE PRACTITIONER

## 2024-12-08 PROCEDURE — 80069 RENAL FUNCTION PANEL: CPT | Performed by: NURSE PRACTITIONER

## 2024-12-08 PROCEDURE — 2500000005 HC RX 250 GENERAL PHARMACY W/O HCPCS

## 2024-12-08 PROCEDURE — 2500000001 HC RX 250 WO HCPCS SELF ADMINISTERED DRUGS (ALT 637 FOR MEDICARE OP): Performed by: NURSE PRACTITIONER

## 2024-12-08 PROCEDURE — 83735 ASSAY OF MAGNESIUM: CPT | Performed by: NURSE PRACTITIONER

## 2024-12-08 PROCEDURE — 99232 SBSQ HOSP IP/OBS MODERATE 35: CPT | Performed by: INTERNAL MEDICINE

## 2024-12-08 PROCEDURE — 1200000002 HC GENERAL ROOM WITH TELEMETRY DAILY

## 2024-12-08 PROCEDURE — 85027 COMPLETE CBC AUTOMATED: CPT | Performed by: NURSE PRACTITIONER

## 2024-12-08 PROCEDURE — 2500000001 HC RX 250 WO HCPCS SELF ADMINISTERED DRUGS (ALT 637 FOR MEDICARE OP)

## 2024-12-08 RX ADMIN — LOSARTAN POTASSIUM 50 MG: 50 TABLET, FILM COATED ORAL at 10:00

## 2024-12-08 RX ADMIN — ATORVASTATIN CALCIUM 20 MG: 20 TABLET, FILM COATED ORAL at 10:00

## 2024-12-08 RX ADMIN — FLUOXETINE HYDROCHLORIDE 20 MG: 20 CAPSULE ORAL at 10:21

## 2024-12-08 RX ADMIN — Medication 125 MCG: at 10:22

## 2024-12-08 RX ADMIN — Medication 3 MG: at 20:47

## 2024-12-08 RX ADMIN — FAMOTIDINE 20 MG: 20 TABLET ORAL at 10:00

## 2024-12-08 RX ADMIN — ASPIRIN 81 MG 81 MG: 81 TABLET ORAL at 10:00

## 2024-12-08 RX ADMIN — FUROSEMIDE 40 MG: 40 TABLET ORAL at 10:00

## 2024-12-08 ASSESSMENT — COGNITIVE AND FUNCTIONAL STATUS - GENERAL
DAILY ACTIVITIY SCORE: 24
DAILY ACTIVITIY SCORE: 24
MOBILITY SCORE: 23
CLIMB 3 TO 5 STEPS WITH RAILING: A LITTLE
CLIMB 3 TO 5 STEPS WITH RAILING: A LITTLE
MOBILITY SCORE: 23

## 2024-12-08 ASSESSMENT — PAIN - FUNCTIONAL ASSESSMENT: PAIN_FUNCTIONAL_ASSESSMENT: 0-10

## 2024-12-08 ASSESSMENT — PAIN SCALES - GENERAL: PAINLEVEL_OUTOF10: 0 - NO PAIN

## 2024-12-08 NOTE — PROGRESS NOTES
Interval Events:  No acute events overnight.     Subjective Data:  Patient seen and assessed this morning, lying in bed, ambulating, NAD. Denies any CP or SOB. Weaned to Ra this morning, reports feeling much better since admission. Updated on plan of care, agreeable to plan.     Today's Plan/Updates:   - hemodynamically stable, cont current regimen  - pending inpatient Ruth TAVR, Tuesday 12/10      Objective Data:  Last Recorded Vitals:  Vitals:    12/08/24 0003 12/08/24 0406 12/08/24 0900 12/08/24 1100   BP: 131/56 130/55 113/50 115/51   BP Location: Right arm Right arm Right arm Right arm   Patient Position: Lying Lying Sitting Sitting   Pulse: 90 93 77 79   Resp: 20 20 18 18   Temp: 36.5 °C (97.7 °F) 36 °C (96.8 °F) 36.4 °C (97.5 °F) 36.6 °C (97.9 °F)   TempSrc: Temporal Temporal Temporal Temporal   SpO2: 96% 96% 96% 98%   Weight:  59.4 kg (130 lb 13.5 oz)     Height:           Last Labs:  Results for orders placed or performed during the hospital encounter of 12/05/24 (from the past 24 hours)   CBC   Result Value Ref Range    WBC 6.9 4.4 - 11.3 x10*3/uL    nRBC 0.0 0.0 - 0.0 /100 WBCs    RBC 3.75 (L) 4.00 - 5.20 x10*6/uL    Hemoglobin 10.6 (L) 12.0 - 16.0 g/dL    Hematocrit 33.2 (L) 36.0 - 46.0 %    MCV 89 80 - 100 fL    MCH 28.3 26.0 - 34.0 pg    MCHC 31.9 (L) 32.0 - 36.0 g/dL    RDW 14.2 11.5 - 14.5 %    Platelets 199 150 - 450 x10*3/uL   Renal Function Panel   Result Value Ref Range    Glucose 76 74 - 99 mg/dL    Sodium 138 136 - 145 mmol/L    Potassium 4.3 3.5 - 5.3 mmol/L    Chloride 103 98 - 107 mmol/L    Bicarbonate 26 21 - 32 mmol/L    Anion Gap 13 10 - 20 mmol/L    Urea Nitrogen 16 6 - 23 mg/dL    Creatinine 1.07 (H) 0.50 - 1.05 mg/dL    eGFR 51 (L) >60 mL/min/1.73m*2    Calcium 8.9 8.6 - 10.6 mg/dL    Phosphorus 4.1 2.5 - 4.9 mg/dL    Albumin 3.7 3.4 - 5.0 g/dL   Magnesium   Result Value Ref Range    Magnesium 2.06 1.60 - 2.40 mg/dL        TROPHS   Date/Time Value Ref Range Status   12/05/2024 11:17 AM  129 0 - 34 ng/L Final   12/05/2024 11:17  0 - 34 ng/L Final   11/19/2024 12:37  0 - 34 ng/L Final     Comment:     Previous result verified on 11/19/2024 1229 on specimen/case [id number] called with component [facility] for procedure Troponin I, High Sensitivity, Initial with value 188 ng/L.     BNP   Date/Time Value Ref Range Status   12/05/2024 11:17  0 - 99 pg/mL Final   11/19/2024 11:21  0 - 99 pg/mL Final     LDLCALC   Date/Time Value Ref Range Status   02/08/2024 10:48 AM 63 <=99 mg/dL Final     Comment:                                 Near   Borderline      AGE      Desirable  Optimal    High     High     Very High     0-19 Y     0 - 109     ---    110-129   >/= 130     ----    20-24 Y     0 - 119     ---    120-159   >/= 160     ----      >24 Y     0 -  99   100-129  130-159   160-189     >/=190       VLDL   Date/Time Value Ref Range Status   02/08/2024 10:48 AM 15 0 - 40 mg/dL Final   07/15/2023 11:07 AM 17 0 - 40 mg/dL Final   01/31/2023 10:57 AM 20 0 - 40 mg/dL Final   09/01/2022 09:08 AM 21 0 - 40 mg/dL Final      Last I/O:  I/O last 3 completed shifts:  In: 800 (13.5 mL/kg) [P.O.:800]  Out: 1550 (26.1 mL/kg) [Urine:1550 (0.7 mL/kg/hr)]  Weight: 59.3 kg     Past Cardiology Tests (Last 3 Years):  EKG:  ECG 12 lead 12/05/2024  Normal sinus rhythm with sinus arrhythmia  Rightward axis  Anterior infarct (cited on or before 19-NOV-2024)  Abnormal ECG  When compared with ECG of 19-NOV-2024 12:39,  QRS axis Shifted right     See ED provider note for full interpretation and clinical correlation  Confirmed by Ni Serrano ([id number]) on 12/5/2024 11:51:34 PM    Echo:  Transesophageal Echo (SEN)   CONCLUSIONS:   1. Unable to insert SEN probe.    Transthoracic echo (TTE) complete 10/28/2024     CONCLUSIONS:   1. The left ventricular systolic function is normal, with a Lee's biplane calculated ejection fraction of 56%.   2. Spectral Doppler shows a restrictive pattern of left ventricular  diastolic filling.   3. There is normal right ventricular global systolic function.   4. The left atrium is moderately dilated.   5. There is a large pleural effusion.   6. The mitral valve is moderately thickened.   7. There is moderate mitral annular calcification.   8. Moderate mitral valve regurgitation.   9. There is a Medtronic transcatheter aortic valve replacement, with a 26mm reported size.  10. Moderate aortic valve regurgitation.  11. Severely elevated pulmonary artery pressure.  12. There is a moderately sized atrial septal defect.  13. There is moderate dilatation of the aortic root.    Ejection Fractions:  EF   Date/Time Value Ref Range Status   10/28/2024 11:19 AM 56 %      Cath:  Cardiac Catheterization Procedure 11/22/2024  Recommendations:  Maximize medical therapy.  Agressive risk factor modification efforts.  Patient counseled and advised to discontinue smoking.  Medical management of coronary artery disease.     ____________________________________________________________________________________  CONCLUSIONS:   1. Right dominant coronary circulation with non-obstructive CAD, as described above.   2. LVEDP 12 mm Hg.    Inpatient Medications:  Scheduled medications   Medication Dose Route Frequency    aspirin  81 mg oral Daily    atorvastatin  20 mg oral Daily    cholecalciferol  5,000 Units oral Daily    famotidine  20 mg oral Daily    FLUoxetine  20 mg oral Daily    fluticasone  1 spray Each Nostril Daily    furosemide  40 mg oral Daily    losartan  50 mg oral Daily    polyethylene glycol  17 g oral Daily     PRN medications   Medication    albuterol    melatonin    ondansetron ODT    Or    ondansetron     Continuous Medications   Medication Dose Last Rate       Physical Exam:  Constitutional:       Appearance: Normal appearance.   Cardiovascular:      Rate and Rhythm: Normal rate and regular rhythm.      Pulses: Normal pulses.      Heart sounds: systolic Murmur present   Pulmonary:      Effort:  Pulmonary effort is normal.      Breath sounds: Normal breath sounds. On RA  Abdominal:      General: Abdomen is flat.   Musculoskeletal:         General: Normal range of motion.      Cervical back: Normal range of motion.   Skin:     General: Skin is warm and dry. No edema  Neurological:      General: No focal deficit present.      Mental Status: She is alert and oriented to person, place, and time.   Psychiatric:         Mood and Affect: Mood normal.         Behavior: Behavior normal.      Assessment/Plan   Aure Hdez is an 84 year old female with a PMH of CAD, LBBB, aortic stenosis s/p TAVR 03/16/2018, HTN, diastolic heart failure, mitral regurgitation, GERD, depression, SDH, and syncope. The patient underwent TAVR on 03/16/2018 and has been followed by Dr. Preciado and LAURA Duffy-CNP. Patient has been experiencing worsening shortness of breath which is what brought her in today.  She states that she has been admitted to hospital on five separate occasions over the past several months, noting that prior to 1 admission she had had a syncopal episode. Patient is admitted to [facility] for structural heart evaluation.       Aortic insufficiency   Hx of Aortic Stenosis s/p TAVR 03/16/2018   - TTE 10/28 EF 56% Aortic Valve: There is a prosthetic aortic valve present. The aortic valve dimensionless index is 0.30. There is a Medtronic transcatheter aortic valve replacement, with a 26 mm reported size. There is moderate lalo-prosthetic aortic valve regurgitation. There is moderate aortic valve regurgitation. The peak instantaneous gradient of the aortic valve is 80.2 mmHg. The mean gradient of the aortic valve is 41.7 mmHg. In comparison to the previous echocardiogram(s): Compared with study dated 4/19/2023, Degree of AI has increased, trans-AVR gradients are severely increased, RVSP is now severely increased. MR has increased. Mobile echodensity is again seen in the LVOT   - Dr. Preciado reviewed prior post- TAVR  imaging and echodensity has been present since TAVR was placed; therefore, low suspicion for endocarditis and SEN deemed not necessary   - Consulted structural heart for further evaluation, following  - worked up last admission had been planning for Ruth TAVR to be done as outpatient  - pending inpatient Ruth TAVR, Tuesday 12/10   - Continue home ASA 81 mg daily, atorvastatin 20 mg daily      Chronic Diastolic Heart Failure   - TTE 10/28 as above   -  (596,1151)  - warm and wet on exam with +2 pitting edema of BLE   - admit CXR: CXR Findings of worsening pulmonary edema with enlarging bilateral pulmonary effusions, lef ft-greater-than-right. Superimposed pneumonia can not be excluded in appropriate clinical context,  - appears euvolemic, remains on RA  - cont PO Lasix 40 mg daily  - o/p cards had considered Jardiance, but was not started, consider following TAVR     HTN  - on admit SBP range 150-170s  - SBP last 24 hrs: 116-145  - c/w home losartan 50 mg daily      Hx of Left frontal subdural hemorrhage, 6/24/24  - Admitted to [facility] for syncopal fall with a head injury  - follows with neurology at [facility]  - Initially ASA was held after discharge until 8/21/24 after serial CTs showed no evidence of acute hemorrhage. There was note of an interval decrease in the density of the small left frontal subdural hemorrhage.   - Was cleared for ASA 81mg daily on 8/21  - Per , patient requires [facility] for work-up. Due to recent SDH, neurosurgery consulted for recommendations regarding heparin products/ anticoagulation with [facility] and potential Rtuh TAVR.   - CTH completed 11/22 showed no subdural hematoma.  - Per neurology, patient is cleared for anticoagulation.      Depression  - continue home fluoxetine 20mg daily      Dispo:   Pending Ruth TAVR 10/12    DVT ppx: SCDs    Code Status: Full Code   NOK: Geeta Juarez, daughter: [phone]    All labs, vital signs, tests & imaging results, and medications  were reviewed.    Seen and discussed with Dr. Maile Frazier, APRN-CNP

## 2024-12-09 LAB
ALBUMIN SERPL BCP-MCNC: 3.8 G/DL (ref 3.4–5)
ANION GAP SERPL CALC-SCNC: 13 MMOL/L (ref 10–20)
BUN SERPL-MCNC: 17 MG/DL (ref 6–23)
CALCIUM SERPL-MCNC: 9 MG/DL (ref 8.6–10.6)
CHLORIDE SERPL-SCNC: 101 MMOL/L (ref 98–107)
CO2 SERPL-SCNC: 27 MMOL/L (ref 21–32)
CREAT SERPL-MCNC: 1.14 MG/DL (ref 0.5–1.05)
EGFRCR SERPLBLD CKD-EPI 2021: 47 ML/MIN/1.73M*2
ERYTHROCYTE [DISTWIDTH] IN BLOOD BY AUTOMATED COUNT: 14.1 % (ref 11.5–14.5)
GLUCOSE SERPL-MCNC: 82 MG/DL (ref 74–99)
HCT VFR BLD AUTO: 32.6 % (ref 36–46)
HGB BLD-MCNC: 10.4 G/DL (ref 12–16)
MAGNESIUM SERPL-MCNC: 2.01 MG/DL (ref 1.6–2.4)
MCH RBC QN AUTO: 28.4 PG (ref 26–34)
MCHC RBC AUTO-ENTMCNC: 31.9 G/DL (ref 32–36)
MCV RBC AUTO: 89 FL (ref 80–100)
NRBC BLD-RTO: 0 /100 WBCS (ref 0–0)
PHOSPHATE SERPL-MCNC: 4.4 MG/DL (ref 2.5–4.9)
PLATELET # BLD AUTO: 194 X10*3/UL (ref 150–450)
POTASSIUM SERPL-SCNC: 4.1 MMOL/L (ref 3.5–5.3)
RBC # BLD AUTO: 3.66 X10*6/UL (ref 4–5.2)
SODIUM SERPL-SCNC: 137 MMOL/L (ref 136–145)
WBC # BLD AUTO: 6.4 X10*3/UL (ref 4.4–11.3)

## 2024-12-09 PROCEDURE — 36415 COLL VENOUS BLD VENIPUNCTURE: CPT | Performed by: NURSE PRACTITIONER

## 2024-12-09 PROCEDURE — 80069 RENAL FUNCTION PANEL: CPT | Performed by: NURSE PRACTITIONER

## 2024-12-09 PROCEDURE — 2500000001 HC RX 250 WO HCPCS SELF ADMINISTERED DRUGS (ALT 637 FOR MEDICARE OP): Performed by: NURSE PRACTITIONER

## 2024-12-09 PROCEDURE — 2500000004 HC RX 250 GENERAL PHARMACY W/ HCPCS (ALT 636 FOR OP/ED): Performed by: NURSE PRACTITIONER

## 2024-12-09 PROCEDURE — 2500000005 HC RX 250 GENERAL PHARMACY W/O HCPCS

## 2024-12-09 PROCEDURE — 2500000001 HC RX 250 WO HCPCS SELF ADMINISTERED DRUGS (ALT 637 FOR MEDICARE OP)

## 2024-12-09 PROCEDURE — 2500000002 HC RX 250 W HCPCS SELF ADMINISTERED DRUGS (ALT 637 FOR MEDICARE OP, ALT 636 FOR OP/ED): Performed by: NURSE PRACTITIONER

## 2024-12-09 PROCEDURE — 99232 SBSQ HOSP IP/OBS MODERATE 35: CPT | Performed by: NURSE PRACTITIONER

## 2024-12-09 PROCEDURE — 1200000002 HC GENERAL ROOM WITH TELEMETRY DAILY

## 2024-12-09 PROCEDURE — 85027 COMPLETE CBC AUTOMATED: CPT | Performed by: NURSE PRACTITIONER

## 2024-12-09 PROCEDURE — 99232 SBSQ HOSP IP/OBS MODERATE 35: CPT | Performed by: INTERNAL MEDICINE

## 2024-12-09 PROCEDURE — 83735 ASSAY OF MAGNESIUM: CPT | Performed by: NURSE PRACTITIONER

## 2024-12-09 RX ORDER — FUROSEMIDE 10 MG/ML
20 INJECTION INTRAMUSCULAR; INTRAVENOUS ONCE
Status: COMPLETED | OUTPATIENT
Start: 2024-12-09 | End: 2024-12-09

## 2024-12-09 RX ADMIN — FUROSEMIDE 20 MG: 10 INJECTION, SOLUTION INTRAVENOUS at 14:04

## 2024-12-09 RX ADMIN — FLUOXETINE HYDROCHLORIDE 20 MG: 20 CAPSULE ORAL at 08:31

## 2024-12-09 RX ADMIN — Medication 125 MCG: at 08:32

## 2024-12-09 RX ADMIN — Medication 3 MG: at 20:53

## 2024-12-09 RX ADMIN — FLUTICASONE PROPIONATE 1 SPRAY: 50 SPRAY, METERED NASAL at 14:13

## 2024-12-09 RX ADMIN — ATORVASTATIN CALCIUM 20 MG: 20 TABLET, FILM COATED ORAL at 08:32

## 2024-12-09 RX ADMIN — FAMOTIDINE 20 MG: 20 TABLET ORAL at 08:32

## 2024-12-09 RX ADMIN — LOSARTAN POTASSIUM 50 MG: 50 TABLET, FILM COATED ORAL at 08:32

## 2024-12-09 RX ADMIN — ASPIRIN 81 MG 81 MG: 81 TABLET ORAL at 08:32

## 2024-12-09 RX ADMIN — FUROSEMIDE 40 MG: 40 TABLET ORAL at 08:32

## 2024-12-09 ASSESSMENT — COGNITIVE AND FUNCTIONAL STATUS - GENERAL
DAILY ACTIVITIY SCORE: 22
MOBILITY SCORE: 23
CLIMB 3 TO 5 STEPS WITH RAILING: A LITTLE
CLIMB 3 TO 5 STEPS WITH RAILING: A LITTLE
DRESSING REGULAR LOWER BODY CLOTHING: A LITTLE
PERSONAL GROOMING: A LITTLE
MOBILITY SCORE: 23
DAILY ACTIVITIY SCORE: 22
PERSONAL GROOMING: A LITTLE
DRESSING REGULAR LOWER BODY CLOTHING: A LITTLE

## 2024-12-09 ASSESSMENT — PAIN SCALES - GENERAL: PAINLEVEL_OUTOF10: 0 - NO PAIN

## 2024-12-09 ASSESSMENT — PAIN - FUNCTIONAL ASSESSMENT: PAIN_FUNCTIONAL_ASSESSMENT: 0-10

## 2024-12-09 NOTE — PROGRESS NOTES
Interval Events:  No acute events overnight.     Subjective Data:  Patient seen and assessed this morning, lying in bed, ambulating, NAD. Denies any CP or SOB. Weaned to Ra this morning, reports feeling much better since admission. Updated on plan of care, agreeable to plan.     Today's Plan/Updates:   - hemodynamically stable, cont current regimen  - pending inpatient Ruth TAVR, Wednesday 12/11  - extra lasix today lasix 20mg iv      Objective Data:  Last Recorded Vitals:  Vitals:    12/09/24 0040 12/09/24 0551 12/09/24 0800 12/09/24 1230   BP: 130/55 150/55 134/50 118/51   BP Location: Left arm Left arm Left arm Right arm   Patient Position: Lying Lying Lying Sitting   Pulse: 92 77 83 82   Resp: 20 19 18 18   Temp: 36.2 °C (97.2 °F) 36.3 °C (97.3 °F) 36.6 °C (97.9 °F) 36.7 °C (98.1 °F)   TempSrc: Temporal Temporal Temporal Temporal   SpO2: 96% 93% 93% 97%   Weight:  59.3 kg (130 lb 11.7 oz)     Height:           Last Labs:  Results for orders placed or performed during the hospital encounter of 12/05/24 (from the past 24 hours)   CBC   Result Value Ref Range    WBC 6.4 4.4 - 11.3 x10*3/uL    nRBC 0.0 0.0 - 0.0 /100 WBCs    RBC 3.66 (L) 4.00 - 5.20 x10*6/uL    Hemoglobin 10.4 (L) 12.0 - 16.0 g/dL    Hematocrit 32.6 (L) 36.0 - 46.0 %    MCV 89 80 - 100 fL    MCH 28.4 26.0 - 34.0 pg    MCHC 31.9 (L) 32.0 - 36.0 g/dL    RDW 14.1 11.5 - 14.5 %    Platelets 194 150 - 450 x10*3/uL   Renal Function Panel   Result Value Ref Range    Glucose 82 74 - 99 mg/dL    Sodium 137 136 - 145 mmol/L    Potassium 4.1 3.5 - 5.3 mmol/L    Chloride 101 98 - 107 mmol/L    Bicarbonate 27 21 - 32 mmol/L    Anion Gap 13 10 - 20 mmol/L    Urea Nitrogen 17 6 - 23 mg/dL    Creatinine 1.14 (H) 0.50 - 1.05 mg/dL    eGFR 47 (L) >60 mL/min/1.73m*2    Calcium 9.0 8.6 - 10.6 mg/dL    Phosphorus 4.4 2.5 - 4.9 mg/dL    Albumin 3.8 3.4 - 5.0 g/dL   Magnesium   Result Value Ref Range    Magnesium 2.01 1.60 - 2.40 mg/dL        TROPHS   Date/Time Value Ref  Range Status   12/05/2024 11:17  0 - 34 ng/L Final   12/05/2024 11:17  0 - 34 ng/L Final   11/19/2024 12:37  0 - 34 ng/L Final     Comment:     Previous result verified on 11/19/2024 1229 on specimen/case [id number] called with component [facility] for procedure Troponin I, High Sensitivity, Initial with value 188 ng/L.     BNP   Date/Time Value Ref Range Status   12/05/2024 11:17  0 - 99 pg/mL Final   11/19/2024 11:21  0 - 99 pg/mL Final     LDLCALC   Date/Time Value Ref Range Status   02/08/2024 10:48 AM 63 <=99 mg/dL Final     Comment:                                 Near   Borderline      AGE      Desirable  Optimal    High     High     Very High     0-19 Y     0 - 109     ---    110-129   >/= 130     ----    20-24 Y     0 - 119     ---    120-159   >/= 160     ----      >24 Y     0 -  99   100-129  130-159   160-189     >/=190       VLDL   Date/Time Value Ref Range Status   02/08/2024 10:48 AM 15 0 - 40 mg/dL Final   07/15/2023 11:07 AM 17 0 - 40 mg/dL Final   01/31/2023 10:57 AM 20 0 - 40 mg/dL Final   09/01/2022 09:08 AM 21 0 - 40 mg/dL Final      Last I/O:  I/O last 3 completed shifts:  In: 360 (6.1 mL/kg) [P.O.:360]  Out: 1350 (22.8 mL/kg) [Urine:1350 (0.6 mL/kg/hr)]  Weight: 59.3 kg     Past Cardiology Tests (Last 3 Years):  EKG:  ECG 12 lead 12/05/2024  Normal sinus rhythm with sinus arrhythmia  Rightward axis  Anterior infarct (cited on or before 19-NOV-2024)  Abnormal ECG  When compared with ECG of 19-NOV-2024 12:39,  QRS axis Shifted right     See ED provider note for full interpretation and clinical correlation  Confirmed by iN Serrano ([id number]) on 12/5/2024 11:51:34 PM    Echo:  Transesophageal Echo (SEN)   CONCLUSIONS:   1. Unable to insert SEN probe.    Transthoracic echo (TTE) complete 10/28/2024     CONCLUSIONS:   1. The left ventricular systolic function is normal, with a Lee's biplane calculated ejection fraction of 56%.   2. Spectral Doppler shows a  restrictive pattern of left ventricular diastolic filling.   3. There is normal right ventricular global systolic function.   4. The left atrium is moderately dilated.   5. There is a large pleural effusion.   6. The mitral valve is moderately thickened.   7. There is moderate mitral annular calcification.   8. Moderate mitral valve regurgitation.   9. There is a Medtronic transcatheter aortic valve replacement, with a 26mm reported size.  10. Moderate aortic valve regurgitation.  11. Severely elevated pulmonary artery pressure.  12. There is a moderately sized atrial septal defect.  13. There is moderate dilatation of the aortic root.    Ejection Fractions:  EF   Date/Time Value Ref Range Status   10/28/2024 11:19 AM 56 %      Cath:  Cardiac Catheterization Procedure 11/22/2024  Recommendations:  Maximize medical therapy.  Agressive risk factor modification efforts.  Patient counseled and advised to discontinue smoking.  Medical management of coronary artery disease.     ____________________________________________________________________________________  CONCLUSIONS:   1. Right dominant coronary circulation with non-obstructive CAD, as described above.   2. LVEDP 12 mm Hg.    Inpatient Medications:  Scheduled medications   Medication Dose Route Frequency    aspirin  81 mg oral Daily    atorvastatin  20 mg oral Daily    cholecalciferol  5,000 Units oral Daily    famotidine  20 mg oral Daily    FLUoxetine  20 mg oral Daily    fluticasone  1 spray Each Nostril Daily    furosemide  40 mg oral Daily    losartan  50 mg oral Daily    polyethylene glycol  17 g oral Daily     PRN medications   Medication    albuterol    melatonin    ondansetron ODT    Or    ondansetron     Continuous Medications   Medication Dose Last Rate       Physical Exam:  Constitutional:       Appearance: Normal appearance.   Cardiovascular:      Rate and Rhythm: Normal rate and regular rhythm.      Pulses: Normal pulses.      Heart sounds: systolic  Murmur present   Pulmonary:      Effort: Pulmonary effort is normal.      Breath sounds: Left base rales . On RA  Abdominal:      General: Abdomen is flat.   Musculoskeletal:         General: Normal range of motion.      Cervical back: Normal range of motion.   Skin:     General: Skin is warm and dry. Slight edema   Neurological:      General: No focal deficit present.      Mental Status: She is alert and oriented to person, place, and time.   Psychiatric:         Mood and Affect: Mood normal.         Behavior: Behavior normal.      Assessment/Plan   Aure Hdez is an 84 year old female with a PMH of CAD, LBBB, aortic stenosis s/p TAVR 03/16/2018, HTN, diastolic heart failure, mitral regurgitation, GERD, depression, SDH, and syncope. The patient underwent TAVR on 03/16/2018 and has been followed by Dr. Preciado and LAURA Duffy-CNP. Patient has been experiencing worsening shortness of breath which is what brought her in today.  She states that she has been admitted to hospital on five separate occasions over the past several months, noting that prior to 1 admission she had had a syncopal episode. Patient is admitted to [facility] for structural heart evaluation.       Aortic insufficiency   Hx of Aortic Stenosis s/p TAVR 03/16/2018   - TTE 10/28 EF 56% Aortic Valve: There is a prosthetic aortic valve present. The aortic valve dimensionless index is 0.30. There is a Medtronic transcatheter aortic valve replacement, with a 26 mm reported size. There is moderate lalo-prosthetic aortic valve regurgitation. There is moderate aortic valve regurgitation. The peak instantaneous gradient of the aortic valve is 80.2 mmHg. The mean gradient of the aortic valve is 41.7 mmHg. In comparison to the previous echocardiogram(s): Compared with study dated 4/19/2023, Degree of AI has increased, trans-AVR gradients are severely increased, RVSP is now severely increased. MR has increased. Mobile echodensity is again seen in the LVOT   -  Dr. Preciado reviewed prior post- TAVR imaging and echodensity has been present since TAVR was placed; therefore, low suspicion for endocarditis and SEN deemed not necessary   - Consulted structural heart for further evaluation, following  - worked up last admission had been planning for Ruth TAVR to be done as outpatient  - pending inpatient Ruth TAVR,  12/11  - Continue home ASA 81 mg daily, atorvastatin 20 mg daily      Chronic Diastolic Heart Failure   - TTE 10/28 as above   -  (596,1151)  - warm and wet on exam with +2 pitting edema of BLE   - admit CXR: CXR Findings of worsening pulmonary edema with enlarging bilateral pulmonary effusions, lef ft-greater-than-right. Superimposed pneumonia can not be excluded in appropriate clinical context,  - appears euvolemic, remains on RA  - cont PO Lasix 40 mg daily  - 12/9 Lasix 20mg iv extra   - o/p cards had considered Jardiance, but was not started, consider following TAVR     HTN  - on admit SBP range 150-170s  - SBP last 24 hrs: 118-150  - c/w home losartan 50 mg daily      Hx of Left frontal subdural hemorrhage, 6/24/24  - Admitted to [facility] for syncopal fall with a head injury  - follows with neurology at [facility]  - Initially ASA was held after discharge until 8/21/24 after serial CTs showed no evidence of acute hemorrhage. There was note of an interval decrease in the density of the small left frontal subdural hemorrhage.   - Was cleared for ASA 81mg daily on 8/21  - Per , patient requires [facility] for work-up. Due to recent SDH, neurosurgery consulted for recommendations regarding heparin products/ anticoagulation with [facility] and potential Ruth TAVR.   - CTH completed 11/22 showed no subdural hematoma.  - Per neurology, patient is cleared for anticoagulation.      Depression  - continue home fluoxetine 20mg daily      Dispo:   Pending Wed Ruth TAVR 12/11     DVT ppx: SCDs    Code Status: Full Code   NOK: Geeta Juarez, daughter:  [phone]    All labs, vital signs, tests & imaging results, and medications were reviewed.    Seen and discussed with Dr. DIANE Blank, APRN-CNP    I conducted a shared care visit with the CHEKO, wean down to room air appears a bit hypervolemic will give extra dose of IV furosemide tentatively planned for redo TAVR on Wednesday

## 2024-12-09 NOTE — CARE PLAN
Problem: Fall/Injury  Goal: Not fall by end of shift  Outcome: Progressing  Goal: Be free from injury by end of the shift  Outcome: Progressing  Goal: Verbalize understanding of personal risk factors for fall in the hospital  Outcome: Progressing  Goal: Verbalize understanding of risk factor reduction measures to prevent injury from fall in the home  Outcome: Progressing  Goal: Use assistive devices by end of the shift  Outcome: Progressing  Goal: Pace activities to prevent fatigue by end of the shift  Outcome: Progressing   The patient's goals for the shift include      The clinical goals for the shift include Pt. will remain safe

## 2024-12-09 NOTE — PROGRESS NOTES
Structural Heart Progress Note    HPI   logan Hdez is a 83 y.o. female here with Aortic valve disease s/p transcatheter aortic valve replacement (TAVR) in 03/16/2018. CAD, LBBB, HTN, diastolic heart failure, mitral regurgitation, GERD, depression, SDH 6/2024, and syncope.   Structural Heart team Evaluated patient for Valve and Valve TAVR as inpatient.     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    Aortic valve regurgitation 09/12/2024    COVID-19 09/03/2024    Atypical chest pain 08/30/2024    Pneumonia due to infectious organism 08/29/2024    Pruritus 08/05/2024    Rash and nonspecific skin eruption 07/24/2024    Subarachnoid hemorrhage (Multi) 07/24/2024    Fall 06/25/2024    SDH (subdural hematoma) (Multi) 06/24/2024    Trauma 06/23/2024    Elevated liver enzymes 02/13/2024    Hypokalemia 02/13/2024    Acute gastroenteritis 02/13/2024    Asymptomatic menopausal state 02/05/2024    Routine general medical examination at health care facility 02/05/2024    History of ventricular tachycardia 02/05/2024    Dry skin dermatitis 10/23/2023    Primary hypertension 09/22/2023    Abnormal mammogram 08/02/2023    Allergic rhinitis due to pollen 08/02/2023    Decreased hearing 08/02/2023    Vertigo 08/02/2023    Eczema 08/02/2023    Ingrown toenail 08/02/2023    Heart murmur 08/02/2023    Deviated nasal septum 08/02/2023    Osteopenia 08/02/2023    Injury of thigh 08/02/2023     Syncope 08/02/2023    Symptoms involving urinary system 08/02/2023    Urinary frequency 08/02/2023    CAD (coronary artery disease) 07/11/2023    Carotid bruit 07/11/2023    Cataract of both eyes 07/11/2023    Controlled depression 07/11/2023    Degeneration of intervertebral disc of cervical region 07/11/2023    Depression, major, in remission (CMS-HCC) 07/11/2023    Gastroesophageal reflux disease 07/11/2023    Insomnia 07/11/2023    Left bundle branch block (LBBB) 07/11/2023    Hyperlipidemia 07/11/2023    Nonrheumatic aortic valve insufficiency 07/11/2023    History of transcatheter aortic valve replacement (TAVR) 07/11/2023    Sensorineural hearing loss (SNHL) of both ears 07/11/2023    Nonsustained ventricular tachycardia (Multi) 07/11/2023    Vitamin D deficiency 07/11/2023    Aortic valve stenosis 11/14/2017    Steatosis of liver 01/04/2002    Abnormal electrocardiography 05/30/2001        LOS: 4 days     Objective     Vitals 24 hour ranges:  Temp:  [36.2 °C (97.2 °F)-36.9 °C (98.4 °F)] 36.6 °C (97.9 °F)  Heart Rate:  [77-92] 83  Resp:  [18-20] 18  BP: (109-150)/(43-55) 134/50  SpO2:  [93 %-97 %] 93 %  Medical Gas Therapy: None (Room air)  Medical Gas Delivery Method: Nasal cannula     Intake/Output last 3 Shifts:    Intake/Output Summary (Last 24 hours) at 12/9/2024 1341  Last data filed at 12/9/2024 0838  Gross per 24 hour   Intake 570 ml   Output 775 ml   Net -205 ml       LDA:  Peripheral IV 12/05/24 20 G Left;Proximal;Anterior Forearm (Active)   Placement Date/Time: 12/05/24 1104   Hand Hygiene Completed: Yes  Size (Gauge): 20 G  Orientation: Left;Proximal;Anterior  Location: Forearm  Patient Tolerance: Age appropriate   Number of days: 4        Vent settings:       Lab Results:  Recent Results (from the past 48 hours)   CBC    Collection Time: 12/08/24  5:24 AM   Result Value Ref Range    WBC 6.9 4.4 - 11.3 x10*3/uL    nRBC 0.0 0.0 - 0.0 /100 WBCs    RBC 3.75 (L) 4.00 - 5.20 x10*6/uL    Hemoglobin 10.6  (L) 12.0 - 16.0 g/dL    Hematocrit 33.2 (L) 36.0 - 46.0 %    MCV 89 80 - 100 fL    MCH 28.3 26.0 - 34.0 pg    MCHC 31.9 (L) 32.0 - 36.0 g/dL    RDW 14.2 11.5 - 14.5 %    Platelets 199 150 - 450 x10*3/uL   Renal Function Panel    Collection Time: 12/08/24  5:24 AM   Result Value Ref Range    Glucose 76 74 - 99 mg/dL    Sodium 138 136 - 145 mmol/L    Potassium 4.3 3.5 - 5.3 mmol/L    Chloride 103 98 - 107 mmol/L    Bicarbonate 26 21 - 32 mmol/L    Anion Gap 13 10 - 20 mmol/L    Urea Nitrogen 16 6 - 23 mg/dL    Creatinine 1.07 (H) 0.50 - 1.05 mg/dL    eGFR 51 (L) >60 mL/min/1.73m*2    Calcium 8.9 8.6 - 10.6 mg/dL    Phosphorus 4.1 2.5 - 4.9 mg/dL    Albumin 3.7 3.4 - 5.0 g/dL   Magnesium    Collection Time: 12/08/24  5:24 AM   Result Value Ref Range    Magnesium 2.06 1.60 - 2.40 mg/dL   CBC    Collection Time: 12/09/24  6:49 AM   Result Value Ref Range    WBC 6.4 4.4 - 11.3 x10*3/uL    nRBC 0.0 0.0 - 0.0 /100 WBCs    RBC 3.66 (L) 4.00 - 5.20 x10*6/uL    Hemoglobin 10.4 (L) 12.0 - 16.0 g/dL    Hematocrit 32.6 (L) 36.0 - 46.0 %    MCV 89 80 - 100 fL    MCH 28.4 26.0 - 34.0 pg    MCHC 31.9 (L) 32.0 - 36.0 g/dL    RDW 14.1 11.5 - 14.5 %    Platelets 194 150 - 450 x10*3/uL   Renal Function Panel    Collection Time: 12/09/24  6:49 AM   Result Value Ref Range    Glucose 82 74 - 99 mg/dL    Sodium 137 136 - 145 mmol/L    Potassium 4.1 3.5 - 5.3 mmol/L    Chloride 101 98 - 107 mmol/L    Bicarbonate 27 21 - 32 mmol/L    Anion Gap 13 10 - 20 mmol/L    Urea Nitrogen 17 6 - 23 mg/dL    Creatinine 1.14 (H) 0.50 - 1.05 mg/dL    eGFR 47 (L) >60 mL/min/1.73m*2    Calcium 9.0 8.6 - 10.6 mg/dL    Phosphorus 4.4 2.5 - 4.9 mg/dL    Albumin 3.8 3.4 - 5.0 g/dL   Magnesium    Collection Time: 12/09/24  6:49 AM   Result Value Ref Range    Magnesium 2.01 1.60 - 2.40 mg/dL       Medications:  Scheduled medications  aspirin, 81 mg, oral, Daily  atorvastatin, 20 mg, oral, Daily  cholecalciferol, 5,000 Units, oral, Daily  famotidine, 20 mg, oral,  Daily  FLUoxetine, 20 mg, oral, Daily  fluticasone, 1 spray, Each Nostril, Daily  furosemide, 20 mg, intravenous, Once  furosemide, 40 mg, oral, Daily  losartan, 50 mg, oral, Daily  polyethylene glycol, 17 g, oral, Daily      Continuous medications     PRN medications  PRN medications: albuterol, melatonin, ondansetron ODT **OR** ondansetron       Physical Exam:  Constitutional: Well developed, awake/alert/oriented x3, no distress, cooperative  Eyes: PERRL, EOMI, clear sclera  ENMT: mucous membranes moist  Head/Neck: Neck supple, No JVD  Respiratory/Thorax: Good chest expansion, thorax symmetric, lung sounds clear but with diminished bases  Cardiovascular: Regular rate and rhythm, no murmurs, normal S1 and S2  Gastrointestinal: Nondistended, soft, non-tender, no rebound tenderness or guarding, no masses palpable, no organomegaly, +BS  Extremities: trace BLE edema, no cyanosis, bilat groins c/d/i with dsd no s/s of hematoma  Neurological: alert and oriented x3, intact senses, motor, response and reflexes, normal strength  Psychological: Appropriate mood and behavior   Skin: Warm and dry, intact.       Assessment/Plan     logan Hdez is a 83 y.o. female here with Aortic valve disease s/p transcatheter aortic valve replacement (TAVR) in 03/16/2018. CAD, LBBB, HTN, diastolic heart failure, mitral regurgitation, GERD, depression, SDH 6/2024, and syncope.   Structural Heart team consulted for the evaluation for inpatient Valve and Valve TAVR.     Valve and Structural Heart Work Up:    - NYHA: III   - Frailty: 2/5        - EKG: NSR,  ms, QRS 80 ms.   - TTE 10/28: EF 56%, s/p TAVR with Evolut 26, severe aortic stenosis with AV gradients 80/41, Vmax 4.48, DI 0.3, moderate valvular regurgitation with P1/2T 158msec, Moderate MR. ASD with left to right shunt. Svevere pulmonary HTN w/ SPAP of 94mmHg.  Mobile echodensity in LVOT.   -SEN: did not tolerate probe down her throat.    - CT TAVR: done.    - Blood Cx X2 Pending   -  [facility]: 11/22/2024  - dental clearance: regular dentist visits q6 months, no issues.   - STS Procedure Type: Isolated AVR   Perioperative OutcomeEstimate %   Operative Mortality6.52%   KCCQ/Walk done     Plan  - Inpatient Valve and Valve TAVR procedure   - Currently considering Wednesday PM add on case. (Once Case schedule confirmed will notified primary team)   - NPO after midnight on 12/11   - Labs: Hgb >10, INR <1.5, Plt >50   - Hold any BB/ACEI on 12/11   - okay to receive any antiplatelet therapy on the day of the procedure.     D/w Dr. Ilana ESTEVES spent 30 minutes in the professional and overall care of this patient.     LAURA Jiang-CNP

## 2024-12-10 PROBLEM — R06.02 SHORTNESS OF BREATH: Status: RESOLVED | Noted: 2024-12-05 | Resolved: 2024-12-10

## 2024-12-10 LAB
ABO GROUP (TYPE) IN BLOOD: NORMAL
ALBUMIN SERPL BCP-MCNC: 3.5 G/DL (ref 3.4–5)
ANION GAP SERPL CALC-SCNC: 12 MMOL/L (ref 10–20)
ANTIBODY SCREEN: NORMAL
BUN SERPL-MCNC: 22 MG/DL (ref 6–23)
CALCIUM SERPL-MCNC: 8.7 MG/DL (ref 8.6–10.6)
CHLORIDE SERPL-SCNC: 100 MMOL/L (ref 98–107)
CO2 SERPL-SCNC: 28 MMOL/L (ref 21–32)
CREAT SERPL-MCNC: 1.14 MG/DL (ref 0.5–1.05)
EGFRCR SERPLBLD CKD-EPI 2021: 47 ML/MIN/1.73M*2
ERYTHROCYTE [DISTWIDTH] IN BLOOD BY AUTOMATED COUNT: 14.1 % (ref 11.5–14.5)
GLUCOSE SERPL-MCNC: 87 MG/DL (ref 74–99)
HCT VFR BLD AUTO: 30.7 % (ref 36–46)
HGB BLD-MCNC: 9.9 G/DL (ref 12–16)
MAGNESIUM SERPL-MCNC: 1.97 MG/DL (ref 1.6–2.4)
MCH RBC QN AUTO: 28.1 PG (ref 26–34)
MCHC RBC AUTO-ENTMCNC: 32.2 G/DL (ref 32–36)
MCV RBC AUTO: 87 FL (ref 80–100)
NRBC BLD-RTO: 0 /100 WBCS (ref 0–0)
PHOSPHATE SERPL-MCNC: 4.7 MG/DL (ref 2.5–4.9)
PLATELET # BLD AUTO: 164 X10*3/UL (ref 150–450)
POTASSIUM SERPL-SCNC: 3.8 MMOL/L (ref 3.5–5.3)
RBC # BLD AUTO: 3.52 X10*6/UL (ref 4–5.2)
RH FACTOR (ANTIGEN D): NORMAL
SODIUM SERPL-SCNC: 136 MMOL/L (ref 136–145)
WBC # BLD AUTO: 6.1 X10*3/UL (ref 4.4–11.3)

## 2024-12-10 PROCEDURE — 2500000002 HC RX 250 W HCPCS SELF ADMINISTERED DRUGS (ALT 637 FOR MEDICARE OP, ALT 636 FOR OP/ED): Performed by: NURSE PRACTITIONER

## 2024-12-10 PROCEDURE — 36415 COLL VENOUS BLD VENIPUNCTURE: CPT | Performed by: NURSE PRACTITIONER

## 2024-12-10 PROCEDURE — 80069 RENAL FUNCTION PANEL: CPT | Performed by: NURSE PRACTITIONER

## 2024-12-10 PROCEDURE — 85027 COMPLETE CBC AUTOMATED: CPT | Performed by: NURSE PRACTITIONER

## 2024-12-10 PROCEDURE — 2500000005 HC RX 250 GENERAL PHARMACY W/O HCPCS

## 2024-12-10 PROCEDURE — 1200000002 HC GENERAL ROOM WITH TELEMETRY DAILY

## 2024-12-10 PROCEDURE — 99232 SBSQ HOSP IP/OBS MODERATE 35: CPT | Performed by: INTERNAL MEDICINE

## 2024-12-10 PROCEDURE — 2500000001 HC RX 250 WO HCPCS SELF ADMINISTERED DRUGS (ALT 637 FOR MEDICARE OP): Performed by: NURSE PRACTITIONER

## 2024-12-10 PROCEDURE — 2500000004 HC RX 250 GENERAL PHARMACY W/ HCPCS (ALT 636 FOR OP/ED): Performed by: NURSE PRACTITIONER

## 2024-12-10 PROCEDURE — 86900 BLOOD TYPING SEROLOGIC ABO: CPT | Performed by: NURSE PRACTITIONER

## 2024-12-10 PROCEDURE — 2500000001 HC RX 250 WO HCPCS SELF ADMINISTERED DRUGS (ALT 637 FOR MEDICARE OP)

## 2024-12-10 PROCEDURE — 83735 ASSAY OF MAGNESIUM: CPT | Performed by: NURSE PRACTITIONER

## 2024-12-10 RX ORDER — POTASSIUM CHLORIDE 750 MG/1
10 TABLET, FILM COATED, EXTENDED RELEASE ORAL ONCE
Status: COMPLETED | OUTPATIENT
Start: 2024-12-10 | End: 2024-12-10

## 2024-12-10 RX ORDER — FUROSEMIDE 10 MG/ML
20 INJECTION INTRAMUSCULAR; INTRAVENOUS ONCE
Status: COMPLETED | OUTPATIENT
Start: 2024-12-10 | End: 2024-12-10

## 2024-12-10 RX ORDER — ACETAMINOPHEN 160 MG/5ML
650 SOLUTION ORAL EVERY 6 HOURS PRN
Status: CANCELLED | OUTPATIENT
Start: 2024-12-11

## 2024-12-10 RX ORDER — ONDANSETRON HYDROCHLORIDE 2 MG/ML
4 INJECTION, SOLUTION INTRAVENOUS EVERY 8 HOURS PRN
Status: CANCELLED | OUTPATIENT
Start: 2024-12-11

## 2024-12-10 RX ORDER — LIDOCAINE HYDROCHLORIDE AND EPINEPHRINE 10; 20 UG/ML; MG/ML
5 INJECTION, SOLUTION INFILTRATION; PERINEURAL ONCE
Status: CANCELLED | OUTPATIENT
Start: 2024-12-11

## 2024-12-10 RX ORDER — PANTOPRAZOLE SODIUM 40 MG/1
40 TABLET, DELAYED RELEASE ORAL
Status: CANCELLED | OUTPATIENT
Start: 2024-12-11

## 2024-12-10 RX ORDER — NAPROXEN SODIUM 220 MG/1
81 TABLET, FILM COATED ORAL DAILY
Status: CANCELLED | OUTPATIENT
Start: 2024-12-11

## 2024-12-10 RX ORDER — TRAMADOL HYDROCHLORIDE 50 MG/1
50 TABLET ORAL EVERY 6 HOURS PRN
Status: CANCELLED | OUTPATIENT
Start: 2024-12-11

## 2024-12-10 RX ORDER — ACETAMINOPHEN 325 MG/1
650 TABLET ORAL EVERY 6 HOURS PRN
Status: CANCELLED | OUTPATIENT
Start: 2024-12-11

## 2024-12-10 RX ORDER — ACETAMINOPHEN 650 MG/1
650 SUPPOSITORY RECTAL EVERY 6 HOURS PRN
Status: CANCELLED | OUTPATIENT
Start: 2024-12-11

## 2024-12-10 RX ORDER — ONDANSETRON 4 MG/1
4 TABLET, FILM COATED ORAL EVERY 8 HOURS PRN
Status: CANCELLED | OUTPATIENT
Start: 2024-12-11

## 2024-12-10 RX ORDER — PANTOPRAZOLE SODIUM 40 MG/10ML
40 INJECTION, POWDER, LYOPHILIZED, FOR SOLUTION INTRAVENOUS
Status: CANCELLED | OUTPATIENT
Start: 2024-12-11

## 2024-12-10 RX ADMIN — ASPIRIN 81 MG 81 MG: 81 TABLET ORAL at 09:16

## 2024-12-10 RX ADMIN — Medication 3 MG: at 20:55

## 2024-12-10 RX ADMIN — LOSARTAN POTASSIUM 50 MG: 50 TABLET, FILM COATED ORAL at 09:16

## 2024-12-10 RX ADMIN — POTASSIUM CHLORIDE 10 MEQ: 750 TABLET, FILM COATED, EXTENDED RELEASE ORAL at 15:50

## 2024-12-10 RX ADMIN — FUROSEMIDE 20 MG: 10 INJECTION, SOLUTION INTRAVENOUS at 11:05

## 2024-12-10 RX ADMIN — FAMOTIDINE 20 MG: 20 TABLET ORAL at 09:16

## 2024-12-10 RX ADMIN — Medication 125 MCG: at 09:16

## 2024-12-10 RX ADMIN — FLUOXETINE HYDROCHLORIDE 20 MG: 20 CAPSULE ORAL at 09:16

## 2024-12-10 RX ADMIN — ATORVASTATIN CALCIUM 20 MG: 20 TABLET, FILM COATED ORAL at 09:16

## 2024-12-10 RX ADMIN — FUROSEMIDE 40 MG: 40 TABLET ORAL at 09:16

## 2024-12-10 RX ADMIN — FLUTICASONE PROPIONATE 1 SPRAY: 50 SPRAY, METERED NASAL at 09:16

## 2024-12-10 ASSESSMENT — COGNITIVE AND FUNCTIONAL STATUS - GENERAL
PERSONAL GROOMING: A LITTLE
CLIMB 3 TO 5 STEPS WITH RAILING: A LITTLE
DAILY ACTIVITIY SCORE: 22
DAILY ACTIVITIY SCORE: 22
PERSONAL GROOMING: A LITTLE
MOBILITY SCORE: 23
DRESSING REGULAR LOWER BODY CLOTHING: A LITTLE
MOBILITY SCORE: 23
CLIMB 3 TO 5 STEPS WITH RAILING: A LITTLE
DRESSING REGULAR LOWER BODY CLOTHING: A LITTLE

## 2024-12-10 ASSESSMENT — PAIN SCALES - GENERAL
PAINLEVEL_OUTOF10: 0 - NO PAIN
PAINLEVEL_OUTOF10: 0 - NO PAIN

## 2024-12-10 ASSESSMENT — PAIN - FUNCTIONAL ASSESSMENT: PAIN_FUNCTIONAL_ASSESSMENT: 0-10

## 2024-12-10 NOTE — DISCHARGE INSTRUCTIONS
####  POST VALVE PROCEDURE DISCHARGE INSTRUCTIONS   ####    - Please weigh yourself daily (first thing in the morning) and record reading  - Please take and record your blood pressure 2 times a day (at least 60-90 mins AFTER you take your meds)  PLEASE HAVE THESE READINGS AVAILABLE DURING YOUR FOLLOW-UP APPOINTMENTS!!    - NO DRIVING OR LIFTING/PULLING/PUSHING GREATER THAN 10 POUNDS FOR 1 WEEK FROM YOUR PROCEDURE DATE.    - A lab requisition has been provided for you to draw a CBC and BMP.  Please take this rec to your local lab to have your labs drawn between 4-7 days post discharge.     - You have been given the order requisition for the 1 month ECHO at the time of your discharge.  Please call and schedule this ECHO at your LOCAL center (no sooner than 23 days after your procedure date).    - You will have 2 (possibly 3 - if 1 week appointment available) appointments that are vital to your post procedure care, these will be scheduled for you IF YOU NEED TO CHANGE YOUR APPOINTMENT TIME/DATE, PLEASE CALL [phone]   - Please follow up with your PCP within 7-14 days of discharge  - You will follow up with your primary cardiologist in 6-10 weeks    **** No elective dental procedures or cleanings for 3 months post procedure - You will need dental prophylaxis (oral antibiotic) prior to dental work/cleanings for life *****    Please call the STRUCTURAL HEART TEAM LINE if you have any questions or concerns - [phone] (M-F 9am-4pm)  On-Call Cardiology Fellow: [phone] (ONLY for urgent issues on nights/weekends/holidays)      ****   CALL PROVIDER IF:   ****  - Breathing faster than normal.   - Breathing harder than normal or having retractions.   - Fever of 100.4 F (38 C) or higher.   - Chills  - Drinking less than normal.   - Urinating less than normal, over 1 day.   - Acting very sleepy and difficult to awaken.   - Vomiting (throwing up) and not able to eat or drink for 12 hours.   - 3 or more loose, watery  bowel movements in 24 hours (diarrhea).    -Any new concerning symptoms.    - If you develop difficulty breathing, rash, hives, severe nausea, vomiting, light-headedness or any signs of infection, immediately contact your doctor and go to the nearest emergency room.      #####   MISC. HOME-GOING INFO   #####  - DO NOT drink any alcoholic drinks or take any non-prescriptive medications that contain alcohol for the first 24 hours.   - DO NOT make any important decisions for the first 24 hours.      ACTIVITY:  - You are advised to go directly home from the hospital.   - DO NOT lift anything heavier than 10 pounds for one week, this allows for proper healing of the groin.   - No excessive exercise or treadmill use for one week. You may walk and do stairs, slowly.   - No sexual activities for 24 hours after you arrive home.      WOUND CARE:  - If slight bleeding should occur at site, lie down and have someone apply firm pressure just above the puncture site for 5 minutes.  If it continues or is profuse, call 911. Always notify your doctor if bleeding occurs.   - Keep site clean and dry. Let air dry or you may use a simple bandaid.   - Gently cleanse the puncture site in your groin with soap and water only.   - You may experience some tenderness, bruising or minimal inflammation.  If you have any concerns, you may contact the Cath Lab or if any of these symptoms become excessive, contact your cardiologist or go to the emergency room.   - No tub baths, soaking, or swimming for one week.   - May shower the next day after your procedure.      DIET:  - You may resume your normal diet. However, be mindful of your sodium intake.  Ideally you should try to limit your daily intake of sodium to 2-3g a day      HEART FAILURE SPECIFIC INSTRUCTIONS:   - CALL 911 IF YOU HAVE ANY OF THE SIGNS AND SYMPTOMS OF HEART FAILURE:   1. Chest pain   2. Significant Shortness of breath   3. Fainting.   - Notify your physician immediately if you  have shortness of breath; weight gain of 3 lbs. or more; fatigue and loss of energy; swelling of lower extremities or abdomen; dizziness or fainting; change of appetite; and frequent coughing.   - Daily weight on the same scale, same time after voiding and before eating.   - Maintain daily weight log.

## 2024-12-10 NOTE — CARE PLAN
Problem: Fall/Injury  Goal: Not fall by end of shift  Outcome: Progressing  Goal: Be free from injury by end of the shift  Outcome: Progressing  Goal: Verbalize understanding of personal risk factors for fall in the hospital  Outcome: Progressing  Goal: Verbalize understanding of risk factor reduction measures to prevent injury from fall in the home  Outcome: Progressing  Goal: Use assistive devices by end of the shift  Outcome: Progressing  Goal: Pace activities to prevent fatigue by end of the shift  Outcome: Progressing     Problem: Pain - Adult  Goal: Verbalizes/displays adequate comfort level or baseline comfort level  Outcome: Progressing     Problem: Safety - Adult  Goal: Free from fall injury  Outcome: Progressing   The patient's goals for the shift include      The clinical goals for the shift include Pt. will remain safe

## 2024-12-10 NOTE — PROGRESS NOTES
Interval Events:  No acute events overnight.     Subjective Data:  Patient seen and assessed this morning,      Today's Plan/Updates:   - hemodynamically stable, cont current regimen  - pending inpatient Ruth TAVR, Wednesday 12/11  - extra lasix yesterday and today lasix 20mg iv      Objective Data:  Last Recorded Vitals:  Vitals:    12/09/24 2342 12/10/24 0426 12/10/24 0800 12/10/24 1230   BP: 131/52 (!) 107/38 133/53 (!) 139/47   BP Location:   Right arm Left arm   Patient Position: Lying Lying Sitting Sitting   Pulse: 75 72 79 85   Resp: 18 18 16 18   Temp: 36.3 °C (97.3 °F) 36.5 °C (97.7 °F) 36.5 °C (97.7 °F) 36.4 °C (97.5 °F)   TempSrc:   Temporal Temporal   SpO2: 97% 96% 99% 97%   Weight:  56.5 kg (124 lb 9 oz)     Height:           Last Labs:  Results for orders placed or performed during the hospital encounter of 12/05/24 (from the past 24 hours)   CBC   Result Value Ref Range    WBC 6.1 4.4 - 11.3 x10*3/uL    nRBC 0.0 0.0 - 0.0 /100 WBCs    RBC 3.52 (L) 4.00 - 5.20 x10*6/uL    Hemoglobin 9.9 (L) 12.0 - 16.0 g/dL    Hematocrit 30.7 (L) 36.0 - 46.0 %    MCV 87 80 - 100 fL    MCH 28.1 26.0 - 34.0 pg    MCHC 32.2 32.0 - 36.0 g/dL    RDW 14.1 11.5 - 14.5 %    Platelets 164 150 - 450 x10*3/uL   Renal Function Panel   Result Value Ref Range    Glucose 87 74 - 99 mg/dL    Sodium 136 136 - 145 mmol/L    Potassium 3.8 3.5 - 5.3 mmol/L    Chloride 100 98 - 107 mmol/L    Bicarbonate 28 21 - 32 mmol/L    Anion Gap 12 10 - 20 mmol/L    Urea Nitrogen 22 6 - 23 mg/dL    Creatinine 1.14 (H) 0.50 - 1.05 mg/dL    eGFR 47 (L) >60 mL/min/1.73m*2    Calcium 8.7 8.6 - 10.6 mg/dL    Phosphorus 4.7 2.5 - 4.9 mg/dL    Albumin 3.5 3.4 - 5.0 g/dL   Magnesium   Result Value Ref Range    Magnesium 1.97 1.60 - 2.40 mg/dL   Type and Screen   Result Value Ref Range    ABO TYPE O     Rh TYPE POS     ANTIBODY SCREEN NEG         TROPHS   Date/Time Value Ref Range Status   12/05/2024 11:17  0 - 34 ng/L Final   12/05/2024 11:17  0 -  34 ng/L Final   11/19/2024 12:37  0 - 34 ng/L Final     Comment:     Previous result verified on 11/19/2024 1229 on specimen/case [id number] called with component [facility] for procedure Troponin I, High Sensitivity, Initial with value 188 ng/L.     BNP   Date/Time Value Ref Range Status   12/05/2024 11:17  0 - 99 pg/mL Final   11/19/2024 11:21  0 - 99 pg/mL Final     LDLCALC   Date/Time Value Ref Range Status   02/08/2024 10:48 AM 63 <=99 mg/dL Final     Comment:                                 Near   Borderline      AGE      Desirable  Optimal    High     High     Very High     0-19 Y     0 - 109     ---    110-129   >/= 130     ----    20-24 Y     0 - 119     ---    120-159   >/= 160     ----      >24 Y     0 -  99   100-129  130-159   160-189     >/=190       VLDL   Date/Time Value Ref Range Status   02/08/2024 10:48 AM 15 0 - 40 mg/dL Final   07/15/2023 11:07 AM 17 0 - 40 mg/dL Final   01/31/2023 10:57 AM 20 0 - 40 mg/dL Final   09/01/2022 09:08 AM 21 0 - 40 mg/dL Final      Last I/O:  I/O last 3 completed shifts:  In: 450 (8 mL/kg) [P.O.:450]  Out: 1125 (19.9 mL/kg) [Urine:1125 (0.6 mL/kg/hr)]  Weight: 56.5 kg     Past Cardiology Tests (Last 3 Years):  EKG:  ECG 12 lead 12/05/2024  Normal sinus rhythm with sinus arrhythmia  Rightward axis  Anterior infarct (cited on or before 19-NOV-2024)  Abnormal ECG  When compared with ECG of 19-NOV-2024 12:39,  QRS axis Shifted right     See ED provider note for full interpretation and clinical correlation  Confirmed by Ni Serrano ([id number]) on 12/5/2024 11:51:34 PM    Echo:  Transesophageal Echo (SEN)   CONCLUSIONS:   1. Unable to insert SEN probe.    Transthoracic echo (TTE) complete 10/28/2024     CONCLUSIONS:   1. The left ventricular systolic function is normal, with a Lee's biplane calculated ejection fraction of 56%.   2. Spectral Doppler shows a restrictive pattern of left ventricular diastolic filling.   3. There is normal right ventricular  global systolic function.   4. The left atrium is moderately dilated.   5. There is a large pleural effusion.   6. The mitral valve is moderately thickened.   7. There is moderate mitral annular calcification.   8. Moderate mitral valve regurgitation.   9. There is a Medtronic transcatheter aortic valve replacement, with a 26mm reported size.  10. Moderate aortic valve regurgitation.  11. Severely elevated pulmonary artery pressure.  12. There is a moderately sized atrial septal defect.  13. There is moderate dilatation of the aortic root.    Ejection Fractions:  EF   Date/Time Value Ref Range Status   10/28/2024 11:19 AM 56 %      Cath:  Cardiac Catheterization Procedure 11/22/2024  Recommendations:  Maximize medical therapy.  Agressive risk factor modification efforts.  Patient counseled and advised to discontinue smoking.  Medical management of coronary artery disease.     ____________________________________________________________________________________  CONCLUSIONS:   1. Right dominant coronary circulation with non-obstructive CAD, as described above.   2. LVEDP 12 mm Hg.    Inpatient Medications:  Scheduled medications   Medication Dose Route Frequency    aspirin  81 mg oral Daily    atorvastatin  20 mg oral Daily    cholecalciferol  5,000 Units oral Daily    famotidine  20 mg oral Daily    FLUoxetine  20 mg oral Daily    fluticasone  1 spray Each Nostril Daily    furosemide  40 mg oral Daily    losartan  50 mg oral Daily    polyethylene glycol  17 g oral Daily    potassium chloride CR  10 mEq oral Once     PRN medications   Medication    albuterol    melatonin    ondansetron ODT    Or    ondansetron     Continuous Medications   Medication Dose Last Rate       Physical Exam:  Constitutional:       Appearance: Normal appearance.   Cardiovascular:      Rate and Rhythm: Normal rate and regular rhythm.      Pulses: Normal pulses.      Heart sounds: systolic Murmur present   Pulmonary:      Effort: Pulmonary  effort is normal.      Breath sounds: Left base rales . On RA  Abdominal:      General: Abdomen is flat.   Musculoskeletal:         General: Normal range of motion.      Cervical back: Normal range of motion.   Skin:     General: Skin is warm and dry. Slight edema   Neurological:      General: No focal deficit present.      Mental Status: She is alert and oriented to person, place, and time.   Psychiatric:         Mood and Affect: Mood normal.         Behavior: Behavior normal.      Assessment/Plan   Aure Hdez is an 84 year old female with a PMH of CAD, LBBB, aortic stenosis s/p TAVR 03/16/2018, HTN, diastolic heart failure, mitral regurgitation, GERD, depression, SDH, and syncope. The patient underwent TAVR on 03/16/2018 and has been followed by Dr. Preciado and LAURA Duffy-CNP. Patient has been experiencing worsening shortness of breath which is what brought her in today.  She states that she has been admitted to hospital on five separate occasions over the past several months, noting that prior to 1 admission she had had a syncopal episode. Patient is admitted to [facility] for structural heart evaluation.       Aortic insufficiency   Hx of Aortic Stenosis s/p TAVR 03/16/2018   - TTE 10/28 EF 56% Aortic Valve: There is a prosthetic aortic valve present. The aortic valve dimensionless index is 0.30. There is a Medtronic transcatheter aortic valve replacement, with a 26 mm reported size. There is moderate lalo-prosthetic aortic valve regurgitation. There is moderate aortic valve regurgitation. The peak instantaneous gradient of the aortic valve is 80.2 mmHg. The mean gradient of the aortic valve is 41.7 mmHg. In comparison to the previous echocardiogram(s): Compared with study dated 4/19/2023, Degree of AI has increased, trans-AVR gradients are severely increased, RVSP is now severely increased. MR has increased. Mobile echodensity is again seen in the LVOT   - Dr. Preciado reviewed prior post- TAVR imaging  and echodensity has been present since TAVR was placed; therefore, low suspicion for endocarditis and SEN deemed not necessary   - Consulted structural heart for further evaluation, following  - worked up last admission had been planning for Ruth TAVR to be done as outpatient  - pending inpatient Ruth TAVR,  12/11  - Continue home ASA 81 mg daily, atorvastatin 20 mg daily      Chronic Diastolic Heart Failure   - TTE 10/28 as above   -  ([phone])  - warm and wet on exam with +2 pitting edema of BLE   - admit CXR: CXR Findings of worsening pulmonary edema with enlarging bilateral pulmonary effusions, lef ft-greater-than-right. Superimposed pneumonia can not be excluded in appropriate clinical context,  - appears euvolemic, remains on RA  - cont PO Lasix 40 mg daily  - 12/9 & 12/10 Lasix 20mg iv extra   - o/p cards had considered Jardiance, but was not started, consider following TAVR     HTN  - on admit SBP range 150-170s  - SBP last 24 hrs: 107 - 139   - c/w home losartan 50 mg daily      Hx of Left frontal subdural hemorrhage, 6/24/24  - Admitted to [facility] for syncopal fall with a head injury  - follows with neurology at [facility]  - Initially ASA was held after discharge until 8/21/24 after serial CTs showed no evidence of acute hemorrhage. There was note of an interval decrease in the density of the small left frontal subdural hemorrhage.   - Was cleared for ASA 81mg daily on 8/21  - Per , patient requires [facility] for work-up. Due to recent SDH, neurosurgery consulted for recommendations regarding heparin products/ anticoagulation with [facility] and potential Ruth TAVR.   - CTH completed 11/22 showed no subdural hematoma.  - Per neurology, patient is cleared for anticoagulation.      Depression  - continue home fluoxetine 20mg daily      Dispo:   Pending Wed Ruth TAVR 12/11     DVT ppx: SCDs    Code Status: Full Code   NOK: Geeta Juarez, daughter: [phone]    All labs, vital signs, tests & imaging  results, and medications were reviewed.    Seen and discussed with Dr. DIANE Blank, LAURA-CNP    I conducted a shared care visit with the CHEKO, her breathing is stable but does still appear a bit volume overloaded responded well to the dose of diuretics from yesterday we will continue again today plan for TAVR tomorrow

## 2024-12-11 ENCOUNTER — HOSPITAL ENCOUNTER (OUTPATIENT)
Dept: CARDIOLOGY | Facility: HOSPITAL | Age: 84
Discharge: HOME | End: 2024-12-11
Payer: MEDICARE

## 2024-12-11 ENCOUNTER — APPOINTMENT (OUTPATIENT)
Dept: CARDIOLOGY | Facility: HOSPITAL | Age: 84
End: 2024-12-11
Payer: MEDICARE

## 2024-12-11 LAB
ALBUMIN SERPL BCP-MCNC: 3.7 G/DL (ref 3.4–5)
ANION GAP SERPL CALC-SCNC: 11 MMOL/L (ref 10–20)
ANION GAP SERPL CALC-SCNC: 12 MMOL/L (ref 10–20)
BASOPHILS # BLD AUTO: 0.04 X10*3/UL (ref 0–0.1)
BASOPHILS NFR BLD AUTO: 0.7 %
BUN SERPL-MCNC: 16 MG/DL (ref 6–23)
BUN SERPL-MCNC: 21 MG/DL (ref 6–23)
CALCIUM SERPL-MCNC: 8.3 MG/DL (ref 8.6–10.6)
CALCIUM SERPL-MCNC: 8.6 MG/DL (ref 8.6–10.6)
CHLORIDE SERPL-SCNC: 101 MMOL/L (ref 98–107)
CHLORIDE SERPL-SCNC: 101 MMOL/L (ref 98–107)
CO2 SERPL-SCNC: 27 MMOL/L (ref 21–32)
CO2 SERPL-SCNC: 28 MMOL/L (ref 21–32)
CREAT SERPL-MCNC: 1.1 MG/DL (ref 0.5–1.05)
CREAT SERPL-MCNC: 1.12 MG/DL (ref 0.5–1.05)
EGFRCR SERPLBLD CKD-EPI 2021: 48 ML/MIN/1.73M*2
EGFRCR SERPLBLD CKD-EPI 2021: 49 ML/MIN/1.73M*2
EOSINOPHIL # BLD AUTO: 0.18 X10*3/UL (ref 0–0.4)
EOSINOPHIL NFR BLD AUTO: 3.1 %
ERYTHROCYTE [DISTWIDTH] IN BLOOD BY AUTOMATED COUNT: 13.8 % (ref 11.5–14.5)
ERYTHROCYTE [DISTWIDTH] IN BLOOD BY AUTOMATED COUNT: 13.8 % (ref 11.5–14.5)
GLUCOSE SERPL-MCNC: 90 MG/DL (ref 74–99)
GLUCOSE SERPL-MCNC: 92 MG/DL (ref 74–99)
HCT VFR BLD AUTO: 29.9 % (ref 36–46)
HCT VFR BLD AUTO: 31.9 % (ref 36–46)
HGB BLD-MCNC: 10.4 G/DL (ref 12–16)
HGB BLD-MCNC: 9.6 G/DL (ref 12–16)
HOLD SPECIMEN: NORMAL
IMM GRANULOCYTES # BLD AUTO: 0.04 X10*3/UL (ref 0–0.5)
IMM GRANULOCYTES NFR BLD AUTO: 0.7 % (ref 0–0.9)
LYMPHOCYTES # BLD AUTO: 1.4 X10*3/UL (ref 0.8–3)
LYMPHOCYTES NFR BLD AUTO: 24.5 %
MAGNESIUM SERPL-MCNC: 1.91 MG/DL (ref 1.6–2.4)
MAGNESIUM SERPL-MCNC: 2.14 MG/DL (ref 1.6–2.4)
MCH RBC QN AUTO: 28.2 PG (ref 26–34)
MCH RBC QN AUTO: 28.6 PG (ref 26–34)
MCHC RBC AUTO-ENTMCNC: 32.1 G/DL (ref 32–36)
MCHC RBC AUTO-ENTMCNC: 32.6 G/DL (ref 32–36)
MCV RBC AUTO: 88 FL (ref 80–100)
MCV RBC AUTO: 88 FL (ref 80–100)
MONOCYTES # BLD AUTO: 0.35 X10*3/UL (ref 0.05–0.8)
MONOCYTES NFR BLD AUTO: 6.1 %
NEUTROPHILS # BLD AUTO: 3.71 X10*3/UL (ref 1.6–5.5)
NEUTROPHILS NFR BLD AUTO: 64.9 %
NRBC BLD-RTO: 0 /100 WBCS (ref 0–0)
NRBC BLD-RTO: 0 /100 WBCS (ref 0–0)
PHOSPHATE SERPL-MCNC: 4.5 MG/DL (ref 2.5–4.9)
PLATELET # BLD AUTO: 149 X10*3/UL (ref 150–450)
PLATELET # BLD AUTO: 170 X10*3/UL (ref 150–450)
POTASSIUM SERPL-SCNC: 3.5 MMOL/L (ref 3.5–5.3)
POTASSIUM SERPL-SCNC: 4.1 MMOL/L (ref 3.5–5.3)
RBC # BLD AUTO: 3.4 X10*6/UL (ref 4–5.2)
RBC # BLD AUTO: 3.64 X10*6/UL (ref 4–5.2)
SODIUM SERPL-SCNC: 135 MMOL/L (ref 136–145)
SODIUM SERPL-SCNC: 137 MMOL/L (ref 136–145)
WBC # BLD AUTO: 5.7 X10*3/UL (ref 4.4–11.3)
WBC # BLD AUTO: 5.8 X10*3/UL (ref 4.4–11.3)

## 2024-12-11 PROCEDURE — 36415 COLL VENOUS BLD VENIPUNCTURE: CPT | Performed by: NURSE PRACTITIONER

## 2024-12-11 PROCEDURE — 99153 MOD SED SAME PHYS/QHP EA: CPT | Performed by: INTERNAL MEDICINE

## 2024-12-11 PROCEDURE — 83735 ASSAY OF MAGNESIUM: CPT | Performed by: NURSE PRACTITIONER

## 2024-12-11 PROCEDURE — G0269 OCCLUSIVE DEVICE IN VEIN ART: HCPCS | Performed by: INTERNAL MEDICINE

## 2024-12-11 PROCEDURE — 2780000003 HC OR 278 NO HCPCS: Performed by: INTERNAL MEDICINE

## 2024-12-11 PROCEDURE — 99152 MOD SED SAME PHYS/QHP 5/>YRS: CPT | Performed by: INTERNAL MEDICINE

## 2024-12-11 PROCEDURE — 93321 DOPPLER ECHO F-UP/LMTD STD: CPT | Performed by: INTERNAL MEDICINE

## 2024-12-11 PROCEDURE — C1894 INTRO/SHEATH, NON-LASER: HCPCS | Performed by: INTERNAL MEDICINE

## 2024-12-11 PROCEDURE — 80048 BASIC METABOLIC PNL TOTAL CA: CPT | Mod: CCI | Performed by: NURSE PRACTITIONER

## 2024-12-11 PROCEDURE — 33361 REPLACE AORTIC VALVE PERQ: CPT | Performed by: THORACIC SURGERY (CARDIOTHORACIC VASCULAR SURGERY)

## 2024-12-11 PROCEDURE — 2500000002 HC RX 250 W HCPCS SELF ADMINISTERED DRUGS (ALT 637 FOR MEDICARE OP, ALT 636 FOR OP/ED): Performed by: NURSE PRACTITIONER

## 2024-12-11 PROCEDURE — C1760 CLOSURE DEV, VASC: HCPCS | Performed by: INTERNAL MEDICINE

## 2024-12-11 PROCEDURE — 85027 COMPLETE CBC AUTOMATED: CPT | Performed by: NURSE PRACTITIONER

## 2024-12-11 PROCEDURE — C1769 GUIDE WIRE: HCPCS | Performed by: INTERNAL MEDICINE

## 2024-12-11 PROCEDURE — 2500000004 HC RX 250 GENERAL PHARMACY W/ HCPCS (ALT 636 FOR OP/ED): Performed by: INTERNAL MEDICINE

## 2024-12-11 PROCEDURE — 85025 COMPLETE CBC W/AUTO DIFF WBC: CPT | Performed by: NURSE PRACTITIONER

## 2024-12-11 PROCEDURE — 1200000002 HC GENERAL ROOM WITH TELEMETRY DAILY

## 2024-12-11 PROCEDURE — C1756 CATH, PACING, TRANSESOPH: HCPCS | Performed by: INTERNAL MEDICINE

## 2024-12-11 PROCEDURE — 2500000004 HC RX 250 GENERAL PHARMACY W/ HCPCS (ALT 636 FOR OP/ED): Performed by: NURSE PRACTITIONER

## 2024-12-11 PROCEDURE — 2550000001 HC RX 255 CONTRASTS: Performed by: INTERNAL MEDICINE

## 2024-12-11 PROCEDURE — 33361 REPLACE AORTIC VALVE PERQ: CPT | Performed by: INTERNAL MEDICINE

## 2024-12-11 PROCEDURE — 2500000001 HC RX 250 WO HCPCS SELF ADMINISTERED DRUGS (ALT 637 FOR MEDICARE OP)

## 2024-12-11 PROCEDURE — 93308 TTE F-UP OR LMTD: CPT | Performed by: INTERNAL MEDICINE

## 2024-12-11 PROCEDURE — 2720000007 HC OR 272 NO HCPCS: Performed by: INTERNAL MEDICINE

## 2024-12-11 PROCEDURE — 85347 COAGULATION TIME ACTIVATED: CPT | Performed by: INTERNAL MEDICINE

## 2024-12-11 PROCEDURE — 2500000001 HC RX 250 WO HCPCS SELF ADMINISTERED DRUGS (ALT 637 FOR MEDICARE OP): Performed by: NURSE PRACTITIONER

## 2024-12-11 PROCEDURE — 86901 BLOOD TYPING SEROLOGIC RH(D): CPT | Performed by: NURSE PRACTITIONER

## 2024-12-11 PROCEDURE — 2500000005 HC RX 250 GENERAL PHARMACY W/O HCPCS

## 2024-12-11 PROCEDURE — C1889 IMPLANT/INSERT DEVICE, NOC: HCPCS | Performed by: INTERNAL MEDICINE

## 2024-12-11 PROCEDURE — 02RF38Z REPLACEMENT OF AORTIC VALVE WITH ZOOPLASTIC TISSUE, PERCUTANEOUS APPROACH: ICD-10-PCS | Performed by: THORACIC SURGERY (CARDIOTHORACIC VASCULAR SURGERY)

## 2024-12-11 PROCEDURE — 84100 ASSAY OF PHOSPHORUS: CPT | Performed by: NURSE PRACTITIONER

## 2024-12-11 PROCEDURE — 93005 ELECTROCARDIOGRAM TRACING: CPT

## 2024-12-11 PROCEDURE — 93325 DOPPLER ECHO COLOR FLOW MAPG: CPT | Performed by: INTERNAL MEDICINE

## 2024-12-11 PROCEDURE — C9786 TRANSTHORACIC ECHO (TTE) LIMITED: HCPCS

## 2024-12-11 DEVICE — IMPLANTABLE DEVICE: Type: IMPLANTABLE DEVICE | Site: HEART | Status: FUNCTIONAL

## 2024-12-11 RX ORDER — HEPARIN SODIUM 1000 [USP'U]/ML
INJECTION, SOLUTION INTRAVENOUS; SUBCUTANEOUS AS NEEDED
Status: DISCONTINUED | OUTPATIENT
Start: 2024-12-11 | End: 2024-12-11 | Stop reason: HOSPADM

## 2024-12-11 RX ORDER — NAPROXEN SODIUM 220 MG/1
324 TABLET, FILM COATED ORAL ONCE
Status: DISCONTINUED | OUTPATIENT
Start: 2024-12-11 | End: 2024-12-12 | Stop reason: HOSPADM

## 2024-12-11 RX ORDER — LIDOCAINE HYDROCHLORIDE 10 MG/ML
INJECTION, SOLUTION EPIDURAL; INFILTRATION; INTRACAUDAL; PERINEURAL AS NEEDED
Status: DISCONTINUED | OUTPATIENT
Start: 2024-12-11 | End: 2024-12-11 | Stop reason: HOSPADM

## 2024-12-11 RX ORDER — ACETAMINOPHEN 160 MG/5ML
650 SOLUTION ORAL EVERY 4 HOURS PRN
Status: DISCONTINUED | OUTPATIENT
Start: 2024-12-11 | End: 2024-12-12 | Stop reason: HOSPADM

## 2024-12-11 RX ORDER — ACETAMINOPHEN 325 MG/1
650 TABLET ORAL EVERY 4 HOURS PRN
Status: DISCONTINUED | OUTPATIENT
Start: 2024-12-11 | End: 2024-12-12 | Stop reason: HOSPADM

## 2024-12-11 RX ORDER — ACETAMINOPHEN 650 MG/1
650 SUPPOSITORY RECTAL EVERY 4 HOURS PRN
Status: DISCONTINUED | OUTPATIENT
Start: 2024-12-11 | End: 2024-12-12 | Stop reason: HOSPADM

## 2024-12-11 RX ORDER — MIDAZOLAM HYDROCHLORIDE 1 MG/ML
INJECTION, SOLUTION INTRAMUSCULAR; INTRAVENOUS AS NEEDED
Status: DISCONTINUED | OUTPATIENT
Start: 2024-12-11 | End: 2024-12-11 | Stop reason: HOSPADM

## 2024-12-11 RX ORDER — FENTANYL CITRATE 50 UG/ML
INJECTION, SOLUTION INTRAMUSCULAR; INTRAVENOUS AS NEEDED
Status: DISCONTINUED | OUTPATIENT
Start: 2024-12-11 | End: 2024-12-11 | Stop reason: HOSPADM

## 2024-12-11 RX ORDER — MAGNESIUM SULFATE HEPTAHYDRATE 40 MG/ML
2 INJECTION, SOLUTION INTRAVENOUS ONCE
Status: COMPLETED | OUTPATIENT
Start: 2024-12-11 | End: 2024-12-11

## 2024-12-11 RX ORDER — POTASSIUM CHLORIDE 20 MEQ/1
20 TABLET, EXTENDED RELEASE ORAL ONCE
Status: COMPLETED | OUTPATIENT
Start: 2024-12-11 | End: 2024-12-11

## 2024-12-11 RX ORDER — PROTAMINE SULFATE 10 MG/ML
INJECTION, SOLUTION INTRAVENOUS CONTINUOUS PRN
Status: COMPLETED | OUTPATIENT
Start: 2024-12-11 | End: 2024-12-11

## 2024-12-11 RX ADMIN — Medication 125 MCG: at 09:07

## 2024-12-11 RX ADMIN — Medication 3 MG: at 20:39

## 2024-12-11 RX ADMIN — ATORVASTATIN CALCIUM 20 MG: 20 TABLET, FILM COATED ORAL at 09:07

## 2024-12-11 RX ADMIN — FAMOTIDINE 20 MG: 20 TABLET ORAL at 09:07

## 2024-12-11 RX ADMIN — LOSARTAN POTASSIUM 50 MG: 50 TABLET, FILM COATED ORAL at 09:07

## 2024-12-11 RX ADMIN — CEFAZOLIN 2 G: 2 INJECTION, POWDER, FOR SOLUTION INTRAMUSCULAR; INTRAVENOUS at 13:26

## 2024-12-11 RX ADMIN — FLUOXETINE HYDROCHLORIDE 20 MG: 20 CAPSULE ORAL at 09:07

## 2024-12-11 RX ADMIN — ASPIRIN 81 MG 81 MG: 81 TABLET ORAL at 09:07

## 2024-12-11 RX ADMIN — FUROSEMIDE 40 MG: 40 TABLET ORAL at 09:07

## 2024-12-11 RX ADMIN — POTASSIUM CHLORIDE 20 MEQ: 1500 TABLET, EXTENDED RELEASE ORAL at 17:02

## 2024-12-11 RX ADMIN — MAGNESIUM SULFATE HEPTAHYDRATE 2 G: 2 INJECTION, SOLUTION INTRAVENOUS at 17:02

## 2024-12-11 ASSESSMENT — PAIN SCALES - GENERAL
PAINLEVEL_OUTOF10: 3
PAINLEVEL_OUTOF10: 0 - NO PAIN

## 2024-12-11 ASSESSMENT — COGNITIVE AND FUNCTIONAL STATUS - GENERAL
PERSONAL GROOMING: A LITTLE
MOBILITY SCORE: 23
DRESSING REGULAR LOWER BODY CLOTHING: A LITTLE
DAILY ACTIVITIY SCORE: 22
MOBILITY SCORE: 23
PERSONAL GROOMING: A LITTLE
DAILY ACTIVITIY SCORE: 22
CLIMB 3 TO 5 STEPS WITH RAILING: A LITTLE
DRESSING REGULAR LOWER BODY CLOTHING: A LITTLE
CLIMB 3 TO 5 STEPS WITH RAILING: A LITTLE

## 2024-12-11 ASSESSMENT — PAIN - FUNCTIONAL ASSESSMENT
PAIN_FUNCTIONAL_ASSESSMENT: 0-10

## 2024-12-11 ASSESSMENT — PAIN DESCRIPTION - DESCRIPTORS: DESCRIPTORS: NAGGING

## 2024-12-11 NOTE — PROGRESS NOTES
Interval Events:  No acute events overnight.     Subjective Data:  Patient seen and assessed this morning,    Family in the room waiting for structural procedure today with patient     Today's Plan/Updates:   - hemodynamically stable, cont current regimen  - pending inpatient Ruth TAVR, Wednesday 12/11 - today      Objective Data:  Last Recorded Vitals:  Vitals:    12/10/24 2052 12/11/24 0018 12/11/24 0513 12/11/24 0730   BP: (!) 134/47 121/75 135/56 120/52   BP Location: Left arm Left arm Left arm Left arm   Patient Position: Lying Lying Lying Lying   Pulse: 78 78 92 77   Resp: 18 18 18 18   Temp: 36 °C (96.8 °F) 36.2 °C (97.2 °F) 36.5 °C (97.7 °F) 36.2 °C (97.2 °F)   TempSrc: Temporal Temporal Temporal Temporal   SpO2: 100% 97% 93% 97%   Weight:   59.3 kg (130 lb 11.7 oz)    Height:           Last Labs:  Results for orders placed or performed during the hospital encounter of 12/05/24 (from the past 24 hours)   CBC   Result Value Ref Range    WBC 5.8 4.4 - 11.3 x10*3/uL    nRBC 0.0 0.0 - 0.0 /100 WBCs    RBC 3.64 (L) 4.00 - 5.20 x10*6/uL    Hemoglobin 10.4 (L) 12.0 - 16.0 g/dL    Hematocrit 31.9 (L) 36.0 - 46.0 %    MCV 88 80 - 100 fL    MCH 28.6 26.0 - 34.0 pg    MCHC 32.6 32.0 - 36.0 g/dL    RDW 13.8 11.5 - 14.5 %    Platelets 170 150 - 450 x10*3/uL   Renal Function Panel   Result Value Ref Range    Glucose 90 74 - 99 mg/dL    Sodium 135 (L) 136 - 145 mmol/L    Potassium 4.1 3.5 - 5.3 mmol/L    Chloride 101 98 - 107 mmol/L    Bicarbonate 27 21 - 32 mmol/L    Anion Gap 11 10 - 20 mmol/L    Urea Nitrogen 21 6 - 23 mg/dL    Creatinine 1.10 (H) 0.50 - 1.05 mg/dL    eGFR 49 (L) >60 mL/min/1.73m*2    Calcium 8.6 8.6 - 10.6 mg/dL    Phosphorus 4.5 2.5 - 4.9 mg/dL    Albumin 3.7 3.4 - 5.0 g/dL   Magnesium   Result Value Ref Range    Magnesium 2.14 1.60 - 2.40 mg/dL        TROPHS   Date/Time Value Ref Range Status   12/05/2024 11:17  0 - 34 ng/L Final   12/05/2024 11:17  0 - 34 ng/L Final   11/19/2024 12:37 PM  164 0 - 34 ng/L Final     Comment:     Previous result verified on 11/19/2024 1229 on specimen/case [id number] called with component [facility] for procedure Troponin I, High Sensitivity, Initial with value 188 ng/L.     BNP   Date/Time Value Ref Range Status   12/05/2024 11:17  0 - 99 pg/mL Final   11/19/2024 11:21  0 - 99 pg/mL Final     LDLCALC   Date/Time Value Ref Range Status   02/08/2024 10:48 AM 63 <=99 mg/dL Final     Comment:                                 Near   Borderline      AGE      Desirable  Optimal    High     High     Very High     0-19 Y     0 - 109     ---    110-129   >/= 130     ----    20-24 Y     0 - 119     ---    120-159   >/= 160     ----      >24 Y     0 -  99   100-129  130-159   160-189     >/=190       VLDL   Date/Time Value Ref Range Status   02/08/2024 10:48 AM 15 0 - 40 mg/dL Final   07/15/2023 11:07 AM 17 0 - 40 mg/dL Final   01/31/2023 10:57 AM 20 0 - 40 mg/dL Final   09/01/2022 09:08 AM 21 0 - 40 mg/dL Final      Last I/O:  I/O last 3 completed shifts:  In: 240 (4 mL/kg) [P.O.:240]  Out: 1575 (26.6 mL/kg) [Urine:1575 (0.7 mL/kg/hr)]  Weight: 59.3 kg     Past Cardiology Tests (Last 3 Years):  EKG:  ECG 12 lead 12/05/2024  Normal sinus rhythm with sinus arrhythmia  Rightward axis  Anterior infarct (cited on or before 19-NOV-2024)  Abnormal ECG  When compared with ECG of 19-NOV-2024 12:39,  QRS axis Shifted right     See ED provider note for full interpretation and clinical correlation  Confirmed by Ni Serrano ([id number]) on 12/5/2024 11:51:34 PM    Echo:  Transesophageal Echo (SEN)   CONCLUSIONS:   1. Unable to insert SEN probe.    Transthoracic echo (TTE) complete 10/28/2024     CONCLUSIONS:   1. The left ventricular systolic function is normal, with a Lee's biplane calculated ejection fraction of 56%.   2. Spectral Doppler shows a restrictive pattern of left ventricular diastolic filling.   3. There is normal right ventricular global systolic function.   4. The  left atrium is moderately dilated.   5. There is a large pleural effusion.   6. The mitral valve is moderately thickened.   7. There is moderate mitral annular calcification.   8. Moderate mitral valve regurgitation.   9. There is a Medtronic transcatheter aortic valve replacement, with a 26mm reported size.  10. Moderate aortic valve regurgitation.  11. Severely elevated pulmonary artery pressure.  12. There is a moderately sized atrial septal defect.  13. There is moderate dilatation of the aortic root.    Ejection Fractions:  EF   Date/Time Value Ref Range Status   10/28/2024 11:19 AM 56 %      Cath:  Cardiac Catheterization Procedure 11/22/2024  Recommendations:  Maximize medical therapy.  Agressive risk factor modification efforts.  Patient counseled and advised to discontinue smoking.  Medical management of coronary artery disease.     ____________________________________________________________________________________  CONCLUSIONS:   1. Right dominant coronary circulation with non-obstructive CAD, as described above.   2. LVEDP 12 mm Hg.    Inpatient Medications:  Scheduled medications   Medication Dose Route Frequency    aspirin  81 mg oral Daily    atorvastatin  20 mg oral Daily    cholecalciferol  5,000 Units oral Daily    famotidine  20 mg oral Daily    FLUoxetine  20 mg oral Daily    fluticasone  1 spray Each Nostril Daily    furosemide  40 mg oral Daily    losartan  50 mg oral Daily    polyethylene glycol  17 g oral Daily     PRN medications   Medication    albuterol    melatonin    ondansetron ODT    Or    ondansetron     Continuous Medications   Medication Dose Last Rate       Physical Exam:  Constitutional:       Appearance: Normal appearance.   Cardiovascular:      Rate and Rhythm: Normal rate and regular rhythm.      Pulses: Normal pulses.      Heart sounds: systolic Murmur present   Pulmonary:      Effort: Pulmonary effort is normal.      Breath sounds: Left base rales . On RA  Abdominal:       General: Abdomen is flat.   Musculoskeletal:         General: Normal range of motion.      Cervical back: Normal range of motion.   Skin:     General: Skin is warm and dry. Slight edema   Neurological:      General: No focal deficit present.      Mental Status: She is alert and oriented to person, place, and time.   Psychiatric:         Mood and Affect: Mood normal.         Behavior: Behavior normal.      Assessment/Plan   Aure Hdez is an 84 year old female with a PMH of CAD, LBBB, aortic stenosis s/p TAVR 03/16/2018, HTN, diastolic heart failure, mitral regurgitation, GERD, depression, SDH, and syncope. The patient underwent TAVR on 03/16/2018 and has been followed by Dr. Preciado and Sweetie Dubon, APRN-CNP. Patient has been experiencing worsening shortness of breath which is what brought her in today.  She states that she has been admitted to hospital on five separate occasions over the past several months, noting that prior to 1 admission she had had a syncopal episode. Patient is admitted to [facility] for structural heart evaluation.       Aortic insufficiency   Hx of Aortic Stenosis s/p TAVR 03/16/2018   - TTE 10/28 EF 56% Aortic Valve: There is a prosthetic aortic valve present. The aortic valve dimensionless index is 0.30. There is a Medtronic transcatheter aortic valve replacement, with a 26 mm reported size. There is moderate lalo-prosthetic aortic valve regurgitation. There is moderate aortic valve regurgitation. The peak instantaneous gradient of the aortic valve is 80.2 mmHg. The mean gradient of the aortic valve is 41.7 mmHg. In comparison to the previous echocardiogram(s): Compared with study dated 4/19/2023, Degree of AI has increased, trans-AVR gradients are severely increased, RVSP is now severely increased. MR has increased. Mobile echodensity is again seen in the LVOT   - Dr. Preciado reviewed prior post- TAVR imaging and echodensity has been present since TAVR was placed; therefore, low suspicion  for endocarditis and SEN deemed not necessary   - Consulted structural heart for further evaluation, following  - worked up last admission had been planning for Ruth TAVR to be done as outpatient  - pending inpatient Ruth TAVR,  12/11 - today   - Continue home ASA 81 mg daily, atorvastatin 20 mg daily      Chronic Diastolic Heart Failure   - TTE 10/28 as above   -  ([phone])  - warm and wet on exam with +2 pitting edema of BLE   - admit CXR: CXR Findings of worsening pulmonary edema with enlarging bilateral pulmonary effusions, lef ft-greater-than-right. Superimposed pneumonia can not be excluded in appropriate clinical context,  - appears euvolemic, remains on RA  - cont PO Lasix 40 mg daily  - 12/9 & 12/10 Lasix 20mg iv extra   - o/p cards had considered Jardiance, but was not started, consider following TAVR     HTN  - on admit SBP range 150-170s  - SBP last 24 hrs: 135  - 121   - c/w home losartan 50 mg daily      Hx of Left frontal subdural hemorrhage, 6/24/24  - Admitted to [facility] for syncopal fall with a head injury  - follows with neurology at [facility]  - Initially ASA was held after discharge until 8/21/24 after serial CTs showed no evidence of acute hemorrhage. There was note of an interval decrease in the density of the small left frontal subdural hemorrhage.   - Was cleared for ASA 81mg daily on 8/21  - Per , patient requires [facility] for work-up. Due to recent SDH, neurosurgery consulted for recommendations regarding heparin products/ anticoagulation with [facility] and potential Ruth TAVR.   - CTH completed 11/22 showed no subdural hematoma.  - Per neurology, patient is cleared for anticoagulation.      Depression  - continue home fluoxetine 20mg daily      Dispo:   Pending Wed Ruth TAVR 12/11     DVT ppx: SCDs    Code Status: Full Code   NOK: Geeta Juarez, daughter: [phone]    All labs, vital signs, tests & imaging results, and medications were reviewed.    Seen and discussed with   DIANE Blank, APRN-CNP      I conducted a shared care visit with the CHEKO, she feels well today ready for TAVR later today we discussed postprocedural expectations

## 2024-12-11 NOTE — POST-PROCEDURE NOTE
Physician Transition of Care Summary  Invasive Cardiovascular Lab    Procedure Date: 12/11/2024  Attending: Panel 1:     * Fredy Prceiado - Primary  Panel 2:     * Best Blanco - Primary  Resident/Fellow/Other Assistant: Surgeons and Role:  Panel 1:     * Uday Ashley MD - Fellow    Indications:   Pre-op Diagnosis      * Aortic valve stenosis, etiology of cardiac valve disease unspecified [I35.0]    Post-procedure diagnosis:   Post-op Diagnosis     * Aortic valve stenosis, etiology of cardiac valve disease unspecified [I35.0]    Procedure(s):   TVP for TAVR    TAVR (Transcatheter AV Replacement)    TAVR (Transcatheter AV Replacement)  92422 - MS REPLACE AORTIC VALVE PERQ FEMORAL ARTRY APPROACH      Description of the Procedure:     Indication: Severe Aortic Stenosis    Valve used: 20 mm Marie 3 ultra + 2 cc    Access  Primary: right CFA s/p 2 proglide  Secondary: left CFA 6 Russian s/p 1 proglide    TVP: right IJ vein, 7 Russian, removed in the cath lab.     Pre LVEDP: 30 mmhg  Post LVEDP: 5 mmhg    Post gradient TTE: 6 mmhg  No PVL  No effusion    Complications:   none    Stents/Implants:   Implants       Heart Valve Repair    Valve, Heart, 20mm, Sapien3 W/Commander System - [id number] - [id number] - Implanted        Inventory item: VALVE, HEART, 20MM, SAPIEN3  W/COMMANDER SYSTEM Model/Cat number: [id number]    Serial number: [id number] : [employer] PING      As of 12/11/2024       Status: Implanted                              Anticoagulation/Antiplatelet Plan:   aspirin    Estimated Blood Loss:   30 mL    Anesthesia: Moderate Sedation Anesthesia Staff: No anesthesia staff entered.    Any Specimen(s) Removed:   Order Name Source Comment Collection Info Order Time   ABORH Blood, Venous   12/11/2024 12:51 PM     Release result to MyChart   Immediate        BASIC METABOLIC PANEL Blood, Venous   12/11/2024  3:08 PM     Release result to MyChart   Immediate        MAGNESIUM Blood, Venous    12/11/2024  3:08 PM     Release result to MyChart   Immediate        CBC WITH AUTO DIFFERENTIAL Blood, Venous   12/11/2024  3:08 PM     Release result to MyChart   Immediate            Disposition:   Monitor tele  Monitor access sites for bleeding  TTE tomorrow  Bed rest  Structural team will continue to follow.   page structural team for any concerning clinical events.     This patient was included at the access tavr trial.      Electronically signed by: Uday Ashley MD, 12/11/2024 3:10 PM

## 2024-12-11 NOTE — PRE-SEDATION DOCUMENTATION
Patient: Aure Hdez  MRN: [id number]    NPO guidelines met: Yes    Physical Exam    Airway  Mallampati: II     Cardiovascular    Dental    Pulmonary        Plan    ASA 3     Mild

## 2024-12-11 NOTE — CARE PLAN
Problem: Fall/Injury  Goal: Not fall by end of shift  Outcome: Progressing  Goal: Be free from injury by end of the shift  Outcome: Progressing  Goal: Verbalize understanding of personal risk factors for fall in the hospital  Outcome: Progressing  Goal: Verbalize understanding of risk factor reduction measures to prevent injury from fall in the home  Outcome: Progressing  Goal: Use assistive devices by end of the shift  Outcome: Progressing  Goal: Pace activities to prevent fatigue by end of the shift  Outcome: Progressing     Problem: Pain - Adult  Goal: Verbalizes/displays adequate comfort level or baseline comfort level  Outcome: Progressing   The patient's goals for the shift include      The clinical goals for the shift include Pt. will remain safe

## 2024-12-11 NOTE — H&P
H&P reviewed. The patient was examined and there are no changes to the H&P.         Valve and Structural Heart Work Up   - NYHA: III   - Frailty: 2/5        - EKG: NSR,  ms, QRS 80 ms.   - TTE 10/28: EF 56%, s/p TAVR with Evolut 26, severe aortic stenosis with AV gradients 80/41, Vmax 4.48, DI 0.3, moderate valvular regurgitation with P1/2T 158msec, Moderate MR. ASD with left to right shunt. Svevere pulmonary HTN w/ SPAP of 94mmHg.  Mobile echodensity in LVOT.   -SEN: did not tolerate probe down her throat.    - CT TAVR: done.    - Blood Cx X2 Pending   - [facility]: 11/22/2024  - dental clearance: regular dentist visits q6 months, no issues.   - STS Procedure Type: Isolated AVR   Perioperative OutcomeEstimate %   Operative Mortality6.52%   KCCQ/Walk done         Last Labs:  CBC - 12/11/2024:  7:03 AM  5.8 10.4 170    31.9      BMP  135  101  21                  ----------------<90     4.1  27  1.10     CMP - 12/11/2024:  7:04 AM  8.6 7.5 57 --- 1.2   4.5 3.7 15 53      PTT - 12/5/2024: 11:17 AM  1.0   11.5 28     Troponin I, High Sensitivity ([facility])   Date/Time Value Ref Range Status   12/05/2024 11:17  (HH) 0 - 34 ng/L Final   12/05/2024 11:17  (H) 0 - 34 ng/L Final     BNP   Date/Time Value Ref Range Status   12/05/2024 11:17  (H) 0 - 99 pg/mL Final        Last I/O:  I/O last 3 completed shifts:  In: 240 (4 mL/kg) [P.O.:240]  Out: 1575 (26.6 mL/kg) [Urine:1575 (0.7 mL/kg/hr)]  Weight: 59.3 kg     Ejection Fractions:  EF   Date/Time Value Ref Range Status   10/28/2024 11:19 AM 56 %       Cath reviewed - no obstructive CAD.     Plan for ROSIO TAVR . Patient with high operative risk.  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 Following previous TAVR gradients reduced.  CT read: Access Right.  Marie 20 vs. Evolut.  Will need to confirm valve with Dr. Preciado.      12/12: Discussed further with Dr. Preciado plan for TAVR. Patient may be candidate for  upcoming Restore trial. In vivo sizing.  No LVOT calcium.  Existing Evolut 26. Plan  ROSIO TAVR with overfilled Marie 20.

## 2024-12-11 NOTE — POST-PROCEDURE NOTE
Date of the Operation:24   Pt Name:Aure Hdez   MRN:[id number]   Pre Op D. - Severe aortic valve stenosis  Post Op D. - Severe aortic valve stenosis  Operation/Procedure:  1. - Ruth: TAVR in TAVR: Marie 20+2cc mm  Surgeon: ALIN Blanco  Cardiologist: AUGUSTO Preciado  Anesthesia Type: Conscious sedation and local 2% lidocaine  Complications: None  Estimated Blood loss: 150 cc  Operative indications: This patient has history of a previous TAVR back in 2018.  The started with decompensated heart failure and after the valve was interrogated it shows severe structural valve deterioration with the double failure of insufficiency and stenosis.  The heart team recommended to perform valve in valve/TAVR in TAVR.   Operative Findings: Per fluoroscopy we can observe the frame of the previous evolute in aortic position.  Opetive procedure: The patient was placed on the surgical table in a supine position. Prepped and draped as usual.  2% lidocaine for all the percutaneous access.  TPMW through the RIJ. Secondary access: L femoral artery with a 6Fr introducer sheath. Heparin is given for ACT>300s. Primary access on the R femoral artery was prepared with 2 perclose and a 14 Fr introducer sheath.  Pigtails are placed in the NCS and the LV [place]. Pre implant hemodynamics are obtained. GW exchange to a Renatoari preformed wire. The valve previously chosen and prepared is now pushed under fluoroscopy across the aortic arch and then across the valve. Rapid pacing, and the valve is deployed. Delivery system is removed. Post implants hemodynamics are obtained. Post implant surface echo is done showing minimal to no PVL. The procedure is considered successful. The Protamine is given. Perclose are tied down and an Angioseal is applied to the other FA. Pulses are checked. The patient has no new electrical disturbances so the TPMW is pulled out and the patient is transferred for postoperative management.

## 2024-12-11 NOTE — OP NOTE
Date of the Operation:24   Pt Name:Aure Hdez   MRN:[id number]   Pre Op D. - Severe aortic valve stenosis  Post Op D. - Severe aortic valve stenosis  Operation/Procedure:  1. - Ruth: TAVR in TAVR: Marie 20+2cc mm  Surgeon: ALIN Blanco  Cardiologist: AUGUSTO Preciado  Anesthesia Type: Conscious sedation and local 2% lidocaine  Complications: None  Estimated Blood loss: 150 cc  Operative indications: This patient has history of a previous TAVR back in 2018.  The started with decompensated heart failure and after the valve was interrogated it shows severe structural valve deterioration with the double failure of insufficiency and stenosis.  The heart team recommended to perform valve in valve/TAVR in TAVR.   Operative Findings: Per fluoroscopy we can observe the frame of the previous evolute in aortic position.  Opetive procedure: The patient was placed on the surgical table in a supine position. Prepped and draped as usual.  2% lidocaine for all the percutaneous access.  TPMW through the RIJ. Secondary access: L femoral artery with a 6Fr introducer sheath. Heparin is given for ACT>300s. Primary access on the R femoral artery was prepared with 2 perclose and a 14 Fr introducer sheath.  Pigtails are placed in the NCS and the LV [place]. Pre implant hemodynamics are obtained. GW exchange to a Renatoari preformed wire. The valve previously chosen and prepared is now pushed under fluoroscopy across the aortic arch and then across the valve. Rapid pacing, and the valve is deployed. Delivery system is removed. Post implants hemodynamics are obtained. Post implant surface echo is done showing minimal to no PVL. The procedure is considered successful. The Protamine is given. Perclose are tied down and an Angioseal is applied to the other FA. Pulses are checked. The patient has no new electrical disturbances so the TPMW is pulled out and the patient is transferred for postoperative management.

## 2024-12-12 ENCOUNTER — APPOINTMENT (OUTPATIENT)
Dept: CARDIOLOGY | Facility: HOSPITAL | Age: 84
End: 2024-12-12
Payer: MEDICARE

## 2024-12-12 VITALS
HEIGHT: 64 IN | DIASTOLIC BLOOD PRESSURE: 61 MMHG | RESPIRATION RATE: 18 BRPM | HEART RATE: 72 BPM | WEIGHT: 128.31 LBS | OXYGEN SATURATION: 96 % | BODY MASS INDEX: 21.91 KG/M2 | SYSTOLIC BLOOD PRESSURE: 156 MMHG | TEMPERATURE: 97.9 F

## 2024-12-12 LAB
ABO GROUP (TYPE) IN BLOOD: NORMAL
ALBUMIN SERPL BCP-MCNC: 3.5 G/DL (ref 3.4–5)
ANION GAP SERPL CALC-SCNC: 13 MMOL/L (ref 10–20)
ATRIAL RATE: 64 BPM
ATRIAL RATE: 71 BPM
ATRIAL RATE: 76 BPM
BUN SERPL-MCNC: 20 MG/DL (ref 6–23)
CALCIUM SERPL-MCNC: 8.4 MG/DL (ref 8.6–10.6)
CHLORIDE SERPL-SCNC: 101 MMOL/L (ref 98–107)
CO2 SERPL-SCNC: 27 MMOL/L (ref 21–32)
CREAT SERPL-MCNC: 1.01 MG/DL (ref 0.5–1.05)
EGFRCR SERPLBLD CKD-EPI 2021: 54 ML/MIN/1.73M*2
ERYTHROCYTE [DISTWIDTH] IN BLOOD BY AUTOMATED COUNT: 13.9 % (ref 11.5–14.5)
GLUCOSE SERPL-MCNC: 115 MG/DL (ref 74–99)
HCT VFR BLD AUTO: 29 % (ref 36–46)
HGB BLD-MCNC: 9.4 G/DL (ref 12–16)
MAGNESIUM SERPL-MCNC: 2.42 MG/DL (ref 1.6–2.4)
MCH RBC QN AUTO: 28.5 PG (ref 26–34)
MCHC RBC AUTO-ENTMCNC: 32.4 G/DL (ref 32–36)
MCV RBC AUTO: 88 FL (ref 80–100)
NRBC BLD-RTO: 0 /100 WBCS (ref 0–0)
P AXIS: 10 DEGREES
P AXIS: 29 DEGREES
P AXIS: 53 DEGREES
P OFFSET: 193 MS
P OFFSET: 194 MS
P OFFSET: 196 MS
P ONSET: 134 MS
P ONSET: 138 MS
P ONSET: 142 MS
PHOSPHATE SERPL-MCNC: 3.6 MG/DL (ref 2.5–4.9)
PLATELET # BLD AUTO: 146 X10*3/UL (ref 150–450)
POTASSIUM SERPL-SCNC: 3.9 MMOL/L (ref 3.5–5.3)
PR INTERVAL: 150 MS
PR INTERVAL: 160 MS
PR INTERVAL: 170 MS
Q ONSET: 217 MS
Q ONSET: 218 MS
Q ONSET: 219 MS
QRS COUNT: 10 BEATS
QRS COUNT: 12 BEATS
QRS COUNT: 13 BEATS
QRS DURATION: 94 MS
QRS DURATION: 94 MS
QRS DURATION: 96 MS
QT INTERVAL: 410 MS
QT INTERVAL: 440 MS
QT INTERVAL: 466 MS
QTC CALCULATION(BAZETT): 461 MS
QTC CALCULATION(BAZETT): 478 MS
QTC CALCULATION(BAZETT): 480 MS
QTC FREDERICIA: 443 MS
QTC FREDERICIA: 465 MS
QTC FREDERICIA: 476 MS
R AXIS: 55 DEGREES
R AXIS: 83 DEGREES
R AXIS: 87 DEGREES
RBC # BLD AUTO: 3.3 X10*6/UL (ref 4–5.2)
RH FACTOR (ANTIGEN D): NORMAL
SODIUM SERPL-SCNC: 137 MMOL/L (ref 136–145)
T AXIS: 41 DEGREES
T AXIS: 80 DEGREES
T AXIS: 92 DEGREES
T OFFSET: 423 MS
T OFFSET: 439 MS
T OFFSET: 450 MS
VENTRICULAR RATE: 64 BPM
VENTRICULAR RATE: 71 BPM
VENTRICULAR RATE: 76 BPM
WBC # BLD AUTO: 7.3 X10*3/UL (ref 4.4–11.3)

## 2024-12-12 PROCEDURE — C9786 TRANSTHORACIC ECHO (TTE) LIMITED: HCPCS

## 2024-12-12 PROCEDURE — 83735 ASSAY OF MAGNESIUM: CPT | Performed by: NURSE PRACTITIONER

## 2024-12-12 PROCEDURE — 84100 ASSAY OF PHOSPHORUS: CPT | Performed by: NURSE PRACTITIONER

## 2024-12-12 PROCEDURE — 99238 HOSP IP/OBS DSCHRG MGMT 30/<: CPT | Performed by: NURSE PRACTITIONER

## 2024-12-12 PROCEDURE — 93005 ELECTROCARDIOGRAM TRACING: CPT

## 2024-12-12 PROCEDURE — 2500000001 HC RX 250 WO HCPCS SELF ADMINISTERED DRUGS (ALT 637 FOR MEDICARE OP)

## 2024-12-12 PROCEDURE — 93321 DOPPLER ECHO F-UP/LMTD STD: CPT | Performed by: STUDENT IN AN ORGANIZED HEALTH CARE EDUCATION/TRAINING PROGRAM

## 2024-12-12 PROCEDURE — 2500000001 HC RX 250 WO HCPCS SELF ADMINISTERED DRUGS (ALT 637 FOR MEDICARE OP): Performed by: STUDENT IN AN ORGANIZED HEALTH CARE EDUCATION/TRAINING PROGRAM

## 2024-12-12 PROCEDURE — 2500000001 HC RX 250 WO HCPCS SELF ADMINISTERED DRUGS (ALT 637 FOR MEDICARE OP): Performed by: NURSE PRACTITIONER

## 2024-12-12 PROCEDURE — 85027 COMPLETE CBC AUTOMATED: CPT | Performed by: NURSE PRACTITIONER

## 2024-12-12 PROCEDURE — 93308 TTE F-UP OR LMTD: CPT | Performed by: STUDENT IN AN ORGANIZED HEALTH CARE EDUCATION/TRAINING PROGRAM

## 2024-12-12 PROCEDURE — 36415 COLL VENOUS BLD VENIPUNCTURE: CPT | Performed by: NURSE PRACTITIONER

## 2024-12-12 PROCEDURE — XXE2X19 MEASUREMENT OF CARDIAC OUTPUT, COMPUTER-AIDED ASSESSMENT, NEW TECHNOLOGY GROUP 9: ICD-10-PCS | Performed by: INTERNAL MEDICINE

## 2024-12-12 PROCEDURE — 93325 DOPPLER ECHO COLOR FLOW MAPG: CPT | Performed by: STUDENT IN AN ORGANIZED HEALTH CARE EDUCATION/TRAINING PROGRAM

## 2024-12-12 RX ORDER — OXYCODONE HYDROCHLORIDE 5 MG/1
5 TABLET ORAL ONCE
Status: DISCONTINUED | OUTPATIENT
Start: 2024-12-12 | End: 2024-12-12 | Stop reason: HOSPADM

## 2024-12-12 RX ORDER — LOSARTAN POTASSIUM 50 MG/1
25 TABLET ORAL DAILY
Start: 2024-12-12

## 2024-12-12 RX ADMIN — FUROSEMIDE 40 MG: 40 TABLET ORAL at 09:55

## 2024-12-12 RX ADMIN — ASPIRIN 81 MG 81 MG: 81 TABLET ORAL at 09:55

## 2024-12-12 RX ADMIN — ATORVASTATIN CALCIUM 20 MG: 20 TABLET, FILM COATED ORAL at 09:54

## 2024-12-12 RX ADMIN — FAMOTIDINE 20 MG: 20 TABLET ORAL at 09:54

## 2024-12-12 RX ADMIN — ACETAMINOPHEN 650 MG: 325 TABLET ORAL at 00:35

## 2024-12-12 RX ADMIN — Medication 125 MCG: at 09:54

## 2024-12-12 RX ADMIN — LOSARTAN POTASSIUM 50 MG: 50 TABLET, FILM COATED ORAL at 09:54

## 2024-12-12 RX ADMIN — FLUOXETINE HYDROCHLORIDE 20 MG: 20 CAPSULE ORAL at 09:54

## 2024-12-12 ASSESSMENT — COGNITIVE AND FUNCTIONAL STATUS - GENERAL
MOBILITY SCORE: 23
DRESSING REGULAR LOWER BODY CLOTHING: A LITTLE
DAILY ACTIVITIY SCORE: 22
CLIMB 3 TO 5 STEPS WITH RAILING: A LITTLE
PERSONAL GROOMING: A LITTLE

## 2024-12-12 ASSESSMENT — PAIN - FUNCTIONAL ASSESSMENT: PAIN_FUNCTIONAL_ASSESSMENT: 0-10

## 2024-12-12 ASSESSMENT — PAIN SCALES - GENERAL: PAINLEVEL_OUTOF10: 0 - NO PAIN

## 2024-12-12 NOTE — CARE PLAN
Pt discharged home with no home care. Pt verbalized understanding of discharge instructions, diet, activity, medications, incision site care, and follow up appointments.

## 2024-12-12 NOTE — DISCHARGE SUMMARY
Discharge Diagnosis  S/p TAVR    Issues Requiring Follow-Up  None      Hospital Course  Patient Vitals for the past 24 hrs:   BP Temp Temp src Pulse Resp SpO2 Weight   12/12/24 0404 128/67 35.5 °C (95.9 °F) Temporal 73 20 94 % 58.2 kg (128 lb 4.9 oz)   12/11/24 2325 123/69 36 °C (96.8 °F) Temporal 70 19 93 % no documentation   12/11/24 1948 125/50 36.3 °C (97.3 °F) Temporal 60 20 98 % no documentation   12/11/24 1730 140/68 36 °C (96.8 °F) Temporal 76 18 98 % no documentation   12/11/24 1700 144/65 36.2 °C (97.2 °F) Temporal 75 17 100 % no documentation   12/11/24 1630 137/67 36.1 °C (97 °F) Temporal 66 18 95 % no documentation   12/11/24 1615 140/68 36.2 °C (97.2 °F) Temporal 65 17 94 % no documentation   12/11/24 1600 134/72 36 °C (96.8 °F) Temporal 72 17 96 % no documentation   12/11/24 1545 133/66 35.9 °C (96.6 °F) Temporal 67 18 95 % no documentation   12/11/24 1534 155/62 36 °C (96.8 °F) Temporal 71 18 95 % no documentation   12/11/24 1126 135/54 36.2 °C (97.2 °F) Temporal 77 18 96 % no documentation       Results for orders placed or performed during the hospital encounter of 12/05/24 (from the past 96 hours)   CBC   Result Value Ref Range    WBC 6.4 4.4 - 11.3 x10*3/uL    nRBC 0.0 0.0 - 0.0 /100 WBCs    RBC 3.66 (L) 4.00 - 5.20 x10*6/uL    Hemoglobin 10.4 (L) 12.0 - 16.0 g/dL    Hematocrit 32.6 (L) 36.0 - 46.0 %    MCV 89 80 - 100 fL    MCH 28.4 26.0 - 34.0 pg    MCHC 31.9 (L) 32.0 - 36.0 g/dL    RDW 14.1 11.5 - 14.5 %    Platelets 194 150 - 450 x10*3/uL   Renal Function Panel   Result Value Ref Range    Glucose 82 74 - 99 mg/dL    Sodium 137 136 - 145 mmol/L    Potassium 4.1 3.5 - 5.3 mmol/L    Chloride 101 98 - 107 mmol/L    Bicarbonate 27 21 - 32 mmol/L    Anion Gap 13 10 - 20 mmol/L    Urea Nitrogen 17 6 - 23 mg/dL    Creatinine 1.14 (H) 0.50 - 1.05 mg/dL    eGFR 47 (L) >60 mL/min/1.73m*2    Calcium 9.0 8.6 - 10.6 mg/dL    Phosphorus 4.4 2.5 - 4.9 mg/dL    Albumin 3.8 3.4 - 5.0 g/dL   Magnesium   Result  Value Ref Range    Magnesium 2.01 1.60 - 2.40 mg/dL   CBC   Result Value Ref Range    WBC 6.1 4.4 - 11.3 x10*3/uL    nRBC 0.0 0.0 - 0.0 /100 WBCs    RBC 3.52 (L) 4.00 - 5.20 x10*6/uL    Hemoglobin 9.9 (L) 12.0 - 16.0 g/dL    Hematocrit 30.7 (L) 36.0 - 46.0 %    MCV 87 80 - 100 fL    MCH 28.1 26.0 - 34.0 pg    MCHC 32.2 32.0 - 36.0 g/dL    RDW 14.1 11.5 - 14.5 %    Platelets 164 150 - 450 x10*3/uL   Renal Function Panel   Result Value Ref Range    Glucose 87 74 - 99 mg/dL    Sodium 136 136 - 145 mmol/L    Potassium 3.8 3.5 - 5.3 mmol/L    Chloride 100 98 - 107 mmol/L    Bicarbonate 28 21 - 32 mmol/L    Anion Gap 12 10 - 20 mmol/L    Urea Nitrogen 22 6 - 23 mg/dL    Creatinine 1.14 (H) 0.50 - 1.05 mg/dL    eGFR 47 (L) >60 mL/min/1.73m*2    Calcium 8.7 8.6 - 10.6 mg/dL    Phosphorus 4.7 2.5 - 4.9 mg/dL    Albumin 3.5 3.4 - 5.0 g/dL   Magnesium   Result Value Ref Range    Magnesium 1.97 1.60 - 2.40 mg/dL   Type and Screen   Result Value Ref Range    ABO TYPE O     Rh TYPE POS     ANTIBODY SCREEN NEG    CBC   Result Value Ref Range    WBC 5.8 4.4 - 11.3 x10*3/uL    nRBC 0.0 0.0 - 0.0 /100 WBCs    RBC 3.64 (L) 4.00 - 5.20 x10*6/uL    Hemoglobin 10.4 (L) 12.0 - 16.0 g/dL    Hematocrit 31.9 (L) 36.0 - 46.0 %    MCV 88 80 - 100 fL    MCH 28.6 26.0 - 34.0 pg    MCHC 32.6 32.0 - 36.0 g/dL    RDW 13.8 11.5 - 14.5 %    Platelets 170 150 - 450 x10*3/uL   Renal Function Panel   Result Value Ref Range    Glucose 90 74 - 99 mg/dL    Sodium 135 (L) 136 - 145 mmol/L    Potassium 4.1 3.5 - 5.3 mmol/L    Chloride 101 98 - 107 mmol/L    Bicarbonate 27 21 - 32 mmol/L    Anion Gap 11 10 - 20 mmol/L    Urea Nitrogen 21 6 - 23 mg/dL    Creatinine 1.10 (H) 0.50 - 1.05 mg/dL    eGFR 49 (L) >60 mL/min/1.73m*2    Calcium 8.6 8.6 - 10.6 mg/dL    Phosphorus 4.5 2.5 - 4.9 mg/dL    Albumin 3.7 3.4 - 5.0 g/dL   Magnesium   Result Value Ref Range    Magnesium 2.14 1.60 - 2.40 mg/dL   Transthoracic Echo (TTE) Limited   Result Value Ref Range    AV pk  emily 3.92 m/s    AV mn grad 37 mmHg    LVOT diam 1.70 cm    Tricuspid annular plane systolic excursion 2.4 cm    LV EF 63 %    Aortic Valve Area by Continuity of VTI 1.01 cm2    Aortic Valve Area by Continuity of Peak Velocity 0.97 cm2    AV pk grad 61 mmHg    LV A4C EF 62.2    Basic metabolic panel   Result Value Ref Range    Glucose 92 74 - 99 mg/dL    Sodium 137 136 - 145 mmol/L    Potassium 3.5 3.5 - 5.3 mmol/L    Chloride 101 98 - 107 mmol/L    Bicarbonate 28 21 - 32 mmol/L    Anion Gap 12 10 - 20 mmol/L    Urea Nitrogen 16 6 - 23 mg/dL    Creatinine 1.12 (H) 0.50 - 1.05 mg/dL    eGFR 48 (L) >60 mL/min/1.73m*2    Calcium 8.3 (L) 8.6 - 10.6 mg/dL   Magnesium   Result Value Ref Range    Magnesium 1.91 1.60 - 2.40 mg/dL   CBC and Auto Differential   Result Value Ref Range    WBC 5.7 4.4 - 11.3 x10*3/uL    nRBC 0.0 0.0 - 0.0 /100 WBCs    RBC 3.40 (L) 4.00 - 5.20 x10*6/uL    Hemoglobin 9.6 (L) 12.0 - 16.0 g/dL    Hematocrit 29.9 (L) 36.0 - 46.0 %    MCV 88 80 - 100 fL    MCH 28.2 26.0 - 34.0 pg    MCHC 32.1 32.0 - 36.0 g/dL    RDW 13.8 11.5 - 14.5 %    Platelets 149 (L) 150 - 450 x10*3/uL    Neutrophils % 64.9 40.0 - 80.0 %    Immature Granulocytes %, Automated 0.7 0.0 - 0.9 %    Lymphocytes % 24.5 13.0 - 44.0 %    Monocytes % 6.1 2.0 - 10.0 %    Eosinophils % 3.1 0.0 - 6.0 %    Basophils % 0.7 0.0 - 2.0 %    Neutrophils Absolute 3.71 1.60 - 5.50 x10*3/uL    Immature Granulocytes Absolute, Automated 0.04 0.00 - 0.50 x10*3/uL    Lymphocytes Absolute 1.40 0.80 - 3.00 x10*3/uL    Monocytes Absolute 0.35 0.05 - 0.80 x10*3/uL    Eosinophils Absolute 0.18 0.00 - 0.40 x10*3/uL    Basophils Absolute 0.04 0.00 - 0.10 x10*3/uL   Light Blue Top   Result Value Ref Range    Extra Tube Hold for add-ons.    Abo/Rh   Result Value Ref Range    ABO TYPE O     Rh TYPE POS    CBC   Result Value Ref Range    WBC 7.3 4.4 - 11.3 x10*3/uL    nRBC 0.0 0.0 - 0.0 /100 WBCs    RBC 3.30 (L) 4.00 - 5.20 x10*6/uL    Hemoglobin 9.4 (L) 12.0 - 16.0  g/dL    Hematocrit 29.0 (L) 36.0 - 46.0 %    MCV 88 80 - 100 fL    MCH 28.5 26.0 - 34.0 pg    MCHC 32.4 32.0 - 36.0 g/dL    RDW 13.9 11.5 - 14.5 %    Platelets 146 (L) 150 - 450 x10*3/uL   Renal Function Panel   Result Value Ref Range    Glucose 115 (H) 74 - 99 mg/dL    Sodium 137 136 - 145 mmol/L    Potassium 3.9 3.5 - 5.3 mmol/L    Chloride 101 98 - 107 mmol/L    Bicarbonate 27 21 - 32 mmol/L    Anion Gap 13 10 - 20 mmol/L    Urea Nitrogen 20 6 - 23 mg/dL    Creatinine 1.01 0.50 - 1.05 mg/dL    eGFR 54 (L) >60 mL/min/1.73m*2    Calcium 8.4 (L) 8.6 - 10.6 mg/dL    Phosphorus 3.6 2.5 - 4.9 mg/dL    Albumin 3.5 3.4 - 5.0 g/dL   Magnesium   Result Value Ref Range    Magnesium 2.42 (H) 1.60 - 2.40 mg/dL   Electrocardiogram, 12-lead PRN ACS symptoms   Result Value Ref Range    Ventricular Rate 71 BPM    Atrial Rate 71 BPM    CT Interval 170 ms    QRS Duration 94 ms    QT Interval 440 ms    QTC Calculation(Bazett) 478 ms    P Axis 29 degrees    R Axis 55 degrees    T Axis 80 degrees    QRS Count 12 beats    Q Onset 219 ms    P Onset 134 ms    P Offset 193 ms    T Offset 439 ms    QTC Fredericia 465 ms       Aure Hdez is a 84-year-old female with a past medical history of aortic stenosis s/p TAVR, CAD, LBBB, HTN, mitral regurg  S/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  Prior TAVR was ~ 7 years ago and she was enrolled in low risk trial at the time.  S/p 20mm Marie 3 Ultra +2cc on 12/11/24 with Farshad Preciado and Francis    EKG pre shows NSR Jade 164 QRS 90  EKG post shows NSR Jade 170 QRS 94    Doing well clinically, euvolemic on exam. Bilat groins with DSD no evidence of bleeding, oozing, hematoma or ecchymosis.      Plan  -Ambulate and reassess groins  -Echo per protocol   -ASA 81mg as monotherapy  -Reduce Losartan to 25mg daily  -D/C home   -Follow up with PCP in 1-2 weeks  -Follow up with Primary cards in 6-10 weeks  -Follow up with Structural NP in virtual clinic at 1 week, 1 month and 1 year with echo at 1  mo and year    Post procedure echo requirements per TVT registry    -1 month echo to be done 23-75 days post implant  -1 year echo to be done 305-425 days post implant   - Annually after implant    D/w Dr. Ilana ESTEVES personally spent 55 minutes with this patient, of which >50% of time was spent counseling and coordination of care            Pertinent Physical Exam At Time of Discharge  Physical Exam  Constitutional: Well developed, awake/alert/oriented x3, no distress,  cooperative  Eyes: PERRL, EOMI, clear sclera  ENMT: mucous membranes moist, no apparent injury, no lesions seen  Head/Neck: Neck supple, no apparent injury, thyroid without mass or tenderness, No JVD, trachea midline, no bruits  Respiratory/Thorax: Patent airways,  good chest expansion, thorax symmetric, cta  Cardiovascular: Regular rate and rhythm, no murmurs, normal S 1and S 2  Gastrointestinal: Nondistended, soft, non-tender, no rebound tenderness or guarding, no masses palpable, no organomegaly, +BS, no bruits  Extremities: normal extremities, no cyanosis,  bilat groins c/d/i with dsd no s/s of hematoma  Neurological: alert and oriented x3, intact senses, motor, response and reflexes, normal strength  Psychological: Appropriate mood and behavior   Skin: Warm and dry, intact   Home Medications     Medication List      Continue taking these medications     nebulizers misc; 1 Device see administration instructions. Compressor   and nebulizer kit (tubing and mouth piece)     Ask your doctor about these medications     * albuterol 1.25 mg/3 mL nebulizer solution   * albuterol 90 mcg/actuation inhaler; Inhale 2 puffs every 6 hours if   needed for wheezing.   aspirin 81 mg chewable tablet; Chew 1 tablet (81 mg) once daily.   atorvastatin 20 mg tablet; Commonly known as: Lipitor; TAKE ONE TABLET   BY MOUTH EVERY DAY   cholecalciferol 125 mcg (5000 UT) capsule; Commonly known as: Vitamin   D-3   clobetasol 0.05 % cream; Commonly known as: Temovate;  Apply to affected   areas of thickened skin on the arms twice daily   famotidine 20 mg tablet; Commonly known as: Pepcid   FLUoxetine 20 mg capsule; Commonly known as: PROzac; TAKE ONE CAPSULE BY   MOUTH EVERY DAY   fluticasone 50 mcg/actuation nasal spray; Commonly known as: Flonase   Allergy Relief; Administer 1 spray into each nostril once daily. Shake   gently. Before first use, prime pump. After use, clean tip and replace   cap.   loratadine-pseudoephedrine  mg 24 hr tablet; Commonly known as:   Claritin-D 24-hour   losartan 50 mg tablet; Commonly known as: Cozaar; Take 1 tablet (50 mg)   by mouth once daily.   Probiotic Digest Supp (6-strn) 10 billion cell -100 mg capsule; Generic   drug: Lnaveed,raúl,sriram,natalie-B.ani,manny-inu  * This list has 2 medication(s) that are the same as other medications   prescribed for you. Read the directions carefully, and ask your doctor or   other care provider to review them with you.       Outpatient Follow-Up  Future Appointments   Date Time Provider Department [place]   12/17/2024  8:30 AM Rito Tovar MD [id number] [facility]   1/9/2025  2:30 PM  CHEKO [id number] CARD1 [id number] [facility]   1/23/2025  1:00 PM Kevin Guzman MD [id number] [facility]   4/7/2025 10:00 AM [facility] E[facility] 1 [id number] [facility]   4/7/2025 11:00 AM Fredy Preciado MD [id number] [facility]   12/11/2025  1:00 PM SH CHEKO [id number] CARD1 [id number] [place]       Fritz Stern, APRN-CNP

## 2024-12-14 LAB
AORTIC VALVE MEAN GRADIENT: 21 MMHG
AORTIC VALVE MEAN GRADIENT: 37 MMHG
AORTIC VALVE PEAK VELOCITY: 2.85 M/S
AORTIC VALVE PEAK VELOCITY: 3.92 M/S
AV PEAK GRADIENT: 33 MMHG
AV PEAK GRADIENT: 61 MMHG
AVA (PEAK VEL): 0.97 CM2
AVA (PEAK VEL): 1.06 CM2
AVA (VTI): 1.01 CM2
AVA (VTI): 1.17 CM2
BODY SURFACE AREA: 1.62 M2
BODY SURFACE AREA: 1.62 M2
EJECTION FRACTION APICAL 4 CHAMBER: 62.2
EJECTION FRACTION APICAL 4 CHAMBER: 65
EJECTION FRACTION: 53 %
EJECTION FRACTION: 63 %
LEFT VENTRICLE INTERNAL DIMENSION DIASTOLE: 5.09 CM (ref 3.5–6)
LEFT VENTRICULAR OUTFLOW TRACT DIAMETER: 1.7 CM
LEFT VENTRICULAR OUTFLOW TRACT DIAMETER: 1.83 CM
MITRAL VALVE E/A RATIO: 0.8
RIGHT VENTRICLE FREE WALL PEAK S': 13.21 CM/S
RIGHT VENTRICLE PEAK SYSTOLIC PRESSURE: 32.1 MMHG
TRICUSPID ANNULAR PLANE SYSTOLIC EXCURSION: 2.2 CM
TRICUSPID ANNULAR PLANE SYSTOLIC EXCURSION: 2.4 CM

## 2024-12-16 ENCOUNTER — TELEPHONE (OUTPATIENT)
Dept: PRIMARY CARE | Facility: CLINIC | Age: 84
End: 2024-12-16

## 2024-12-16 ENCOUNTER — LAB (OUTPATIENT)
Dept: LAB | Facility: LAB | Age: 84
End: 2024-12-16
Payer: MEDICARE

## 2024-12-16 DIAGNOSIS — I35.0 AORTIC VALVE STENOSIS, ETIOLOGY OF CARDIAC VALVE DISEASE UNSPECIFIED: ICD-10-CM

## 2024-12-16 DIAGNOSIS — Z95.2 S/P TAVR (TRANSCATHETER AORTIC VALVE REPLACEMENT): ICD-10-CM

## 2024-12-16 PROCEDURE — 80048 BASIC METABOLIC PNL TOTAL CA: CPT

## 2024-12-16 PROCEDURE — 85027 COMPLETE CBC AUTOMATED: CPT

## 2024-12-16 NOTE — DOCUMENTATION CLARIFICATION NOTE
"    PATIENT:               WAYLON FRANCIS  ACCT #:                  [id number]  MRN:                       [id number]  :                       1940  ADMIT DATE:       2024 10:21 AM  DISCH DATE:        2024 12:38 PM  RESPONDING PROVIDER #:        [id number]          PROVIDER RESPONSE TEXT:    Pneumonia ruled out after workup    CDI QUERY TEXT:    Clarification      Instruction:    Based on your assessment of the patient and the clinical information, please provide the requested documentation by clicking on the appropriate radio button and enter any additional information if prompted.    Question: Please further clarify the diagnosis of Pneumonia as    When answering this query, please exercise your independent professional judgment. The fact that a question is being asked, does not imply that any particular answer is desired or expected.    The patient's clinical indicators include:  Clinical Information: Progress notes indicate pt is an 85y/o female with c/o SOB, admitted for TAVR evaluation    Clinical Indicators:   Admission VS, ED: T36.6- HR 97- RR 18- /54, SPO2 88% RA  , WBC: 8   CXR: Underlying focal consolidation can not be excluded  -: WBC 5.8-7.6     ED: \"Patient is an 84-year-old female coming in with worsening shortness of breath with known valvular disease including mitral valve and aortic valve regurgitation with a past history of a total aortic valve replacement... Presentation right now is not consistent with pneumonia\"     ED: \"Chest x-ray showing worsening pulmonary edema with enlarging bilateral pleural effusions left greater than right, suspect secondary to volume overload with lower suspicion for pneumonia at this time.\"    -, H&P, Progress Notes: \"- admit CXR: CXR Findings of worsening pulmonary edema with enlarging bilateral pulmonary effusions, lef ft-greater-than-right. Superimposed pneumonia can not be excluded in appropriate clinical " "context\"    Treatment: Daily CBC    Risk Factors: Advanced Age, h/o COVID PNA  Options provided:  -- Pneumonia ruled out after workup  -- Pneumonia ruled in for this admission  -- Other - I will add my own diagnosis  -- Refer to Clinical Documentation Reviewer    Query created by: Inez Delgado on 12/16/2024 12:05 PM      Electronically signed by:  JACKELINE GUARDADO MD 12/16/2024 4:03 PM          "

## 2024-12-16 NOTE — TELEPHONE ENCOUNTER
Nicol waddell [place] will be following [facility] [place]  hospital post dc needs. Just fyi

## 2024-12-16 NOTE — DOCUMENTATION CLARIFICATION NOTE
"    PATIENT:               WAYLON FRANCIS  ACCT #:                  [id number]  MRN:                       [id number]  :                       1940  ADMIT DATE:       2024 10:21 AM  DISCH DATE:        2024 12:38 PM  RESPONDING PROVIDER #:        [id number]          PROVIDER RESPONSE TEXT:    Acute decompensated diastolic heart failure    CDI QUERY TEXT:    Clarification      Instruction:    Based on your assessment of the patient and the clinical information, please provide the requested documentation by clicking on the appropriate radio button and enter any additional information if prompted.    Question: Based on your medical judgment, can you please clarify which of these conditions is the most clinically supported    When answering this query, please exercise your independent professional judgment. The fact that a question is being asked, does not imply that any particular answer is desired or expected.    The patient's clinical indicators include:  Clinical Information: There is conflicting documentation in the medical record which requires clarification.    -The diagnosis of \"ADHF\" was documented on 24 in H&P and Structural Heart Treatment Plan    -The diagnosis of \"Chronic Diastolic Heart Failure\" was documented in H&P and Progress Notes on -    Clinical Indicators:   H&P: \"CXR Findings of worsening pulmonary edema with enlarging bilateral pulmonary effusions?BLE edema... diminished lung sounds. BP elevated 170s on admit, improved to 150s. Will notify stuctural of hear admission and admit to [facility] for management of ADHF.\"     H&P: \"Chronic Diastolic Heart Failure -  (596,1151) - warm and wet on exam with +2 pitting edema of BLE - will give 20mg IV lasix and eval for response, more as indicated\"     Structural Heart: \"Plan to expedite Valve to Valve procedure... Primary team continue to optimized ADHF.\"     PN: \"...overall appears euvolemic.  Will recommence p.o. Lasix " "for maintenance.\"      Treatment: IV Lasix 12/5, 12/6, 12/9, 12/10, Monitoring I/Os, Monitoring daily weight    Risk Factors: Aortic stenosis  Options provided:  -- Acute decompensated diastolic heart failure  -- Chronic diastolic heart failure  -- Other - I will add my own diagnosis  -- Refer to Clinical Documentation Reviewer    Query created by: Inez Delgado on 12/16/2024 11:39 AM      Electronically signed by:  JACKELINE GUARDADO MD 12/16/2024 4:03 PM          "

## 2024-12-17 ENCOUNTER — APPOINTMENT (OUTPATIENT)
Dept: PRIMARY CARE | Facility: CLINIC | Age: 84
End: 2024-12-17
Payer: MEDICARE

## 2024-12-17 VITALS
DIASTOLIC BLOOD PRESSURE: 72 MMHG | OXYGEN SATURATION: 97 % | WEIGHT: 131 LBS | HEART RATE: 75 BPM | BODY MASS INDEX: 22.36 KG/M2 | SYSTOLIC BLOOD PRESSURE: 126 MMHG | HEIGHT: 64 IN

## 2024-12-17 DIAGNOSIS — Z95.2 S/P TAVR (TRANSCATHETER AORTIC VALVE REPLACEMENT): ICD-10-CM

## 2024-12-17 DIAGNOSIS — I10 PRIMARY HYPERTENSION: Primary | ICD-10-CM

## 2024-12-17 DIAGNOSIS — E78.2 MIXED HYPERLIPIDEMIA: ICD-10-CM

## 2024-12-17 DIAGNOSIS — F32.5 DEPRESSION, MAJOR, IN REMISSION (CMS-HCC): ICD-10-CM

## 2024-12-17 PROBLEM — S06.5XAA SDH (SUBDURAL HEMATOMA) (MULTI): Status: RESOLVED | Noted: 2024-06-24 | Resolved: 2024-12-17

## 2024-12-17 PROBLEM — J18.9 PNEUMONIA DUE TO INFECTIOUS ORGANISM: Status: RESOLVED | Noted: 2024-08-29 | Resolved: 2024-12-17

## 2024-12-17 PROBLEM — R05.9 COUGH: Status: RESOLVED | Noted: 2024-11-16 | Resolved: 2024-12-17

## 2024-12-17 PROBLEM — I60.9 SUBARACHNOID HEMORRHAGE (MULTI): Status: RESOLVED | Noted: 2024-07-24 | Resolved: 2024-12-17

## 2024-12-17 PROBLEM — R42 DIZZINESS: Status: RESOLVED | Noted: 2024-11-16 | Resolved: 2024-12-17

## 2024-12-17 PROBLEM — K52.9 ACUTE GASTROENTERITIS: Status: RESOLVED | Noted: 2024-02-13 | Resolved: 2024-12-17

## 2024-12-17 PROBLEM — R55 SYNCOPE: Status: RESOLVED | Noted: 2023-08-02 | Resolved: 2024-12-17

## 2024-12-17 PROBLEM — E87.6 HYPOKALEMIA: Status: RESOLVED | Noted: 2024-02-13 | Resolved: 2024-12-17

## 2024-12-17 PROBLEM — U07.1 COVID-19: Status: RESOLVED | Noted: 2024-09-03 | Resolved: 2024-12-17

## 2024-12-17 PROBLEM — R07.89 ATYPICAL CHEST PAIN: Status: RESOLVED | Noted: 2024-08-30 | Resolved: 2024-12-17
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
## 2024-12-17 PROBLEM — J90 PLEURAL EFFUSION: Status: RESOLVED | Noted: 2024-11-05 | Resolved: 2024-12-17

## 2024-12-17 PROBLEM — S79.929A INJURY OF THIGH: Status: RESOLVED | Noted: 2023-08-02 | Resolved: 2024-12-17

## 2024-12-17 PROBLEM — R42 VERTIGO: Status: RESOLVED | Noted: 2023-08-02 | Resolved: 2024-12-17

## 2024-12-17 PROBLEM — R21 RASH AND NONSPECIFIC SKIN ERUPTION: Status: RESOLVED | Noted: 2024-07-24 | Resolved: 2024-12-17

## 2024-12-17 PROBLEM — I47.29 NONSUSTAINED VENTRICULAR TACHYCARDIA (MULTI): Status: RESOLVED | Noted: 2023-07-11 | Resolved: 2024-12-17

## 2024-12-17 PROBLEM — F32.A CONTROLLED DEPRESSION: Status: RESOLVED | Noted: 2023-07-11 | Resolved: 2024-12-17

## 2024-12-17 PROBLEM — J96.01 ACUTE HYPOXEMIC RESPIRATORY FAILURE (MULTI): Status: RESOLVED | Noted: 2024-09-12 | Resolved: 2024-12-17

## 2024-12-17 PROBLEM — T14.90XA TRAUMA: Status: RESOLVED | Noted: 2024-06-23 | Resolved: 2024-12-17

## 2024-12-17 LAB
ANION GAP SERPL CALC-SCNC: 12 MMOL/L (ref 10–20)
BUN SERPL-MCNC: 12 MG/DL (ref 6–23)
CALCIUM SERPL-MCNC: 8.9 MG/DL (ref 8.6–10.6)
CHLORIDE SERPL-SCNC: 102 MMOL/L (ref 98–107)
CO2 SERPL-SCNC: 30 MMOL/L (ref 21–32)
CREAT SERPL-MCNC: 0.77 MG/DL (ref 0.5–1.05)
EGFRCR SERPLBLD CKD-EPI 2021: 75 ML/MIN/1.73M*2
ERYTHROCYTE [DISTWIDTH] IN BLOOD BY AUTOMATED COUNT: 14 % (ref 11.5–14.5)
GLUCOSE SERPL-MCNC: 84 MG/DL (ref 74–99)
HCT VFR BLD AUTO: 32.2 % (ref 36–46)
HGB BLD-MCNC: 9.9 G/DL (ref 12–16)
MCH RBC QN AUTO: 28 PG (ref 26–34)
MCHC RBC AUTO-ENTMCNC: 30.7 G/DL (ref 32–36)
MCV RBC AUTO: 91 FL (ref 80–100)
NRBC BLD-RTO: 0 /100 WBCS (ref 0–0)
PLATELET # BLD AUTO: 169 X10*3/UL (ref 150–450)
POTASSIUM SERPL-SCNC: 4 MMOL/L (ref 3.5–5.3)
RBC # BLD AUTO: 3.53 X10*6/UL (ref 4–5.2)
SODIUM SERPL-SCNC: 140 MMOL/L (ref 136–145)
WBC # BLD AUTO: 4.4 X10*3/UL (ref 4.4–11.3)

## 2024-12-17 RX ORDER — LORATADINE 10 MG/1
10 TABLET ORAL DAILY
COMMUNITY

## 2024-12-17 ASSESSMENT — ENCOUNTER SYMPTOMS
FATIGUE: 0
PANIC: 0
DEPRESSED MOOD: 0
PALPITATIONS: 0
HYPERTENSION: 1
HOPELESSNESS: 0
ABDOMINAL PAIN: 0
DEPRESSION: 1
SHORTNESS OF BREATH: 0
THOUGHTS THAT DEATH WOULD BE EASIER: 0
MYALGIAS: 0
FEELINGS OF WORTHLESSNESS: 0
NERVOUS/ANXIOUS: 0
DIZZINESS: 0

## 2024-12-17 NOTE — PROGRESS NOTES
"Subjective   Patient ID: Aure Hdez is a 84 y.o. female who presents for Follow-up chronic medical problems.    Discharged 12-12-24 s/p TAV in TAVR.  Previous TAVR 2018, CHF due to regurgitation and stenosis now resolved.  Weight down 16 pounds since surgery.  Feels good with better energy level.  DC summary reviewed.  Meds and labs reviewed.    Hypertension  This is a chronic problem. The current episode started more than 1 year ago. The problem has been resolved since onset. The problem is controlled. Pertinent negatives include no chest pain, palpitations or shortness of breath. The current treatment provides significant improvement. There are no compliance problems.    Hyperlipidemia  This is a chronic problem. The current episode started more than 1 year ago. The problem is controlled. Recent lipid tests were reviewed and are low. Pertinent negatives include no chest pain, myalgias or shortness of breath. Current antihyperlipidemic treatment includes statins. The current treatment provides significant improvement of lipids. There are no compliance problems.    Depression  Visit Type: follow-up  Patient is not experiencing: chest pain, depressed mood, feelings of hopelessness, feelings of worthlessness, nervousness/anxiety, palpitations, panic, shortness of breath, suicidal ideas, suicidal planning and thoughts of death.  Frequency of symptoms: occasionally           Review of Systems   Constitutional:  Negative for fatigue.   Respiratory:  Negative for shortness of breath.    Cardiovascular:  Negative for chest pain and palpitations.   Gastrointestinal:  Negative for abdominal pain.   Musculoskeletal:  Negative for myalgias.   Neurological:  Negative for dizziness.   Psychiatric/Behavioral:  Positive for depression. Negative for suicidal ideas. The patient is not nervous/anxious.        Objective   /72 (BP Location: Right arm, Patient Position: Sitting)   Pulse 75   Ht 1.626 m (5' 4\")   Wt 59.4 kg " (131 lb)   SpO2 97%   BMI 22.49 kg/m²     Physical Exam  Constitutional:       Appearance: Normal appearance.   Cardiovascular:      Rate and Rhythm: Normal rate and regular rhythm.      Pulses: Normal pulses.      Heart sounds: Murmur heard.   Pulmonary:      Effort: Pulmonary effort is normal.      Breath sounds: Normal breath sounds.   Skin:     Findings: Bruising present.      Comments: Bruising and hematoma right groin.   Neurological:      General: No focal deficit present.      Mental Status: She is alert.   Psychiatric:         Mood and Affect: Mood normal.         Behavior: Behavior normal.         Thought Content: Thought content normal.         Judgment: Judgment normal.         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         Hyperlipidemia E78.5     Lipids, LDL at goal with statin.  Recommend low fat/cholesterol diet.           Relevant Orders    CBC and Auto Differential    Comprehensive metabolic panel    Lipid panel    S/P TAVR (transcatheter aortic valve replacement) Z95.2     Doing well postop.  Meds reviewed.  Has fu with cardiac surgery.         Primary hypertension - Primary I10     BP at goal with current medications.  Rec low salt diet.  Check BP at home, goal < 140/90.           Relevant Orders    CBC and Auto Differential    Comprehensive metabolic panel

## 2024-12-18 NOTE — DOCUMENTATION CLARIFICATION NOTE
"    PATIENT:               WAYLON FRANCIS  ACCT #:                  [id number]  MRN:                       [id number]  :                       1940  ADMIT DATE:       2024 12:28 PM  DISCH DATE:        2024 2:32 PM  RESPONDING PROVIDER #:        [id number]          PROVIDER RESPONSE TEXT:    I agree with dietician diagnosis of Severe Malnutrition on 2024    CDI QUERY TEXT:    Clarification        Instruction:    Based on your assessment of the patient and the clinical information, please provide the requested documentation by clicking on the appropriate radio button and enter any additional information if prompted.    Question: Please further clarify this patient nutritional status as    When answering this query, please exercise your independent professional judgment. The fact that a question is being asked, does not imply that any particular answer is desired or expected.    The patient's clinical indicators include:  Clinical Information: 83y/o female presenting for TAVR evaluation    Clinical Indicators:   H&P: \"Constitutional:  Positive for appetite change.\"   Nutrition Therapy Consult: \"Per chart, pt with 2.9% weight loss x 1 month and 7.6% weight loss x 3 months.\"   Nutrition Therapy Consult: \"Malnutrition Diagnosis: Severe malnutrition related to chronic disease or condition, As Evidenced by: </equal to 75% EER for >/equal to 1 month, 7.6% weight loss x 3 months, moderate-severe muscle wasting and moderate fat wasting\"    Treatment: Nutrition Consult, Oral Nutritional Supplement (Ensure) BID, Monitoring diet tolerance    Risk Factors: Severe symptomatic aortic valve stenosis, Advanced Age  Options provided:  -- I agree with dietician diagnosis of Severe Malnutrition on 2024  -- Other - I will add my own diagnosis  -- Refer to Clinical Documentation Reviewer    Query created by: Inez Delgado on 2024 4:05 PM      Electronically signed by:  MOISES CHAVEZ " LAURA-CNP 12/18/2024 4:18 PM

## 2024-12-19 LAB
ATRIAL RATE: 64 BPM
ATRIAL RATE: 71 BPM
P AXIS: 10 DEGREES
P AXIS: 29 DEGREES
P OFFSET: 193 MS
P OFFSET: 194 MS
P ONSET: 134 MS
P ONSET: 142 MS
PR INTERVAL: 150 MS
PR INTERVAL: 170 MS
Q ONSET: 217 MS
Q ONSET: 219 MS
QRS COUNT: 10 BEATS
QRS COUNT: 12 BEATS
QRS DURATION: 94 MS
QRS DURATION: 96 MS
QT INTERVAL: 440 MS
QT INTERVAL: 466 MS
QTC CALCULATION(BAZETT): 478 MS
QTC CALCULATION(BAZETT): 480 MS
QTC FREDERICIA: 465 MS
QTC FREDERICIA: 476 MS
R AXIS: 55 DEGREES
R AXIS: 83 DEGREES
T AXIS: 41 DEGREES
T AXIS: 80 DEGREES
T OFFSET: 439 MS
T OFFSET: 450 MS
VENTRICULAR RATE: 64 BPM
VENTRICULAR RATE: 71 BPM

## 2025-01-02 ENCOUNTER — APPOINTMENT (OUTPATIENT)
Dept: PRIMARY CARE | Facility: CLINIC | Age: 85
End: 2025-01-02
Payer: MEDICARE

## 2025-01-02 LAB
ACT BLD: 347 SEC (ref 89–169)
ATRIAL RATE: 76 BPM
P AXIS: 53 DEGREES
P OFFSET: 196 MS
P ONSET: 138 MS
PR INTERVAL: 160 MS
Q ONSET: 218 MS
QRS COUNT: 13 BEATS
QRS DURATION: 94 MS
QT INTERVAL: 410 MS
QTC CALCULATION(BAZETT): 461 MS
QTC FREDERICIA: 443 MS
R AXIS: 87 DEGREES
T AXIS: 92 DEGREES
T OFFSET: 423 MS
VENTRICULAR RATE: 76 BPM

## 2025-01-06 PROBLEM — I35.0 AORTIC VALVE STENOSIS, ETIOLOGY OF CARDIAC VALVE DISEASE UNSPECIFIED: Status: RESOLVED | Noted: 2024-11-19 | Resolved: 2025-01-06

## 2025-01-06 PROBLEM — I50.30 DIASTOLIC HEART FAILURE: Status: RESOLVED | Noted: 2024-11-05 | Resolved: 2025-01-06

## 2025-01-08 ENCOUNTER — OFFICE VISIT (OUTPATIENT)
Dept: PRIMARY CARE | Facility: CLINIC | Age: 85
End: 2025-01-08
Payer: MEDICARE

## 2025-01-08 VITALS
DIASTOLIC BLOOD PRESSURE: 74 MMHG | WEIGHT: 131 LBS | HEIGHT: 64 IN | SYSTOLIC BLOOD PRESSURE: 126 MMHG | OXYGEN SATURATION: 99 % | HEART RATE: 73 BPM | BODY MASS INDEX: 22.36 KG/M2

## 2025-01-08 DIAGNOSIS — J01.90 ACUTE NON-RECURRENT SINUSITIS, UNSPECIFIED LOCATION: ICD-10-CM

## 2025-01-08 DIAGNOSIS — Z12.31 ENCOUNTER FOR SCREENING MAMMOGRAM FOR BREAST CANCER: ICD-10-CM

## 2025-01-08 DIAGNOSIS — H69.93 DYSFUNCTION OF BOTH EUSTACHIAN TUBES: ICD-10-CM

## 2025-01-08 DIAGNOSIS — I50.33 ACUTE ON CHRONIC DIASTOLIC CONGESTIVE HEART FAILURE: ICD-10-CM

## 2025-01-08 DIAGNOSIS — Z00.00 ROUTINE GENERAL MEDICAL EXAMINATION AT HEALTH CARE FACILITY: Primary | ICD-10-CM

## 2025-01-08 RX ORDER — METHYLPREDNISOLONE 4 MG/1
TABLET ORAL
Qty: 21 TABLET | Refills: 0 | Status: SHIPPED | OUTPATIENT
Start: 2025-01-08 | End: 2025-01-14

## 2025-01-08 RX ORDER — LOSARTAN POTASSIUM 25 MG/1
25 TABLET ORAL DAILY
Qty: 90 TABLET | Refills: 3 | Status: SHIPPED | OUTPATIENT
Start: 2025-01-08

## 2025-01-08 ASSESSMENT — ACTIVITIES OF DAILY LIVING (ADL)
BATHING: INDEPENDENT
DRESSING: INDEPENDENT
GROCERY_SHOPPING: INDEPENDENT
TAKING_MEDICATION: INDEPENDENT
DOING_HOUSEWORK: INDEPENDENT
MANAGING_FINANCES: INDEPENDENT

## 2025-01-08 ASSESSMENT — ENCOUNTER SYMPTOMS
WHEEZING: 0
FEVER: 0
SORE THROAT: 1
SINUS PRESSURE: 0
FATIGUE: 0
COUGH: 1
CHILLS: 0
TROUBLE SWALLOWING: 0
SHORTNESS OF BREATH: 0
SINUS PAIN: 0

## 2025-01-08 NOTE — ASSESSMENT & PLAN NOTE
Resolving, no antibiotics needed.    Orders:    methylPREDNISolone (Medrol Dospak) 4 mg tablets; Take as directed on package.

## 2025-01-08 NOTE — ASSESSMENT & PLAN NOTE
Rec medrol dose pack.  Continue claritin and fluticasone spray.    Orders:    methylPREDNISolone (Medrol Dospak) 4 mg tablets; Take as directed on package.

## 2025-01-08 NOTE — PROGRESS NOTES
"Subjective   Reason for Visit: Aure Hdez is an 84 y.o. female here for a Medicare Wellness visit and chronic medical problems.    Past Medical, Surgical, and Family History reviewed and updated in chart.    Reviewed all medications by prescribing practitioner or clinical pharmacist (such as prescriptions, OTCs, herbal therapies and supplements) and documented in the medical record.    Symptoms started few weeks ago during holidays.  Sore throat and stuffy nose.  Otc for symptom relief.  Tylenol and fluticasone spray.  Coughing up more than blowing yellow green phlegm.  Now blowing clear.  Both ears full, scratchy throat.  No CP or SOB.  No wheezing.  Meds reviewed.      Patient Care Team:  Rito Tovar MD as PCP - General  Rito Tovar MD as PCP - [employer] PCP     Review of Systems   Constitutional:  Negative for chills, fatigue and fever.   HENT:  Positive for congestion, ear pain and sore throat. Negative for ear discharge, sinus pressure, sinus pain and trouble swallowing.    Respiratory:  Positive for cough. Negative for shortness of breath and wheezing.    Cardiovascular:  Negative for chest pain.       Objective   Vitals:  /74 (BP Location: Right arm, Patient Position: Sitting)   Pulse 73   Ht 1.626 m (5' 4\")   Wt 59.4 kg (131 lb)   SpO2 99%   BMI 22.49 kg/m²       Physical Exam  Constitutional:       Appearance: Normal appearance.   HENT:      Right Ear: Tympanic membrane and ear canal normal.      Left Ear: Tympanic membrane and ear canal normal.      Mouth/Throat:      Mouth: Mucous membranes are moist.      Pharynx: No oropharyngeal exudate or posterior oropharyngeal erythema.   Cardiovascular:      Rate and Rhythm: Normal rate and regular rhythm.      Pulses: Normal pulses.      Heart sounds: Murmur heard.   Pulmonary:      Effort: Pulmonary effort is normal.      Breath sounds: Normal breath sounds.   Neurological:      General: No focal deficit present.      " Mental Status: She is alert.   Psychiatric:         Mood and Affect: Mood normal.         Behavior: Behavior normal.         Thought Content: Thought content normal.         Judgment: Judgment normal.       Assessment & Plan  Routine general medical examination at health care facility    Orders:  •  1 Year Follow Up In Primary Care - Wellness Exam; Future    Encounter for screening mammogram for breast cancer    Orders:  •  BI mammo bilateral screening tomosynthesis; Future    Acute non-recurrent sinusitis, unspecified location  Resolving, no antibiotics needed.    Orders:  •  methylPREDNISolone (Medrol Dospak) 4 mg tablets; Take as directed on package.    Dysfunction of both eustachian tubes  Rec medrol dose pack.  Continue claritin and fluticasone spray.    Orders:  •  methylPREDNISolone (Medrol Dospak) 4 mg tablets; Take as directed on package.    Acute on chronic diastolic congestive heart failure  Condition aldo s/p cardiac surgery.  Losartan renewed.    Orders:  •  losartan (Cozaar) 25 mg tablet; Take 1 tablet (25 mg) by mouth once daily.

## 2025-01-08 NOTE — ASSESSMENT & PLAN NOTE
Condition aldo s/p cardiac surgery.  Losartan renewed.    Orders:    losartan (Cozaar) 25 mg tablet; Take 1 tablet (25 mg) by mouth once daily.

## 2025-01-09 ENCOUNTER — TELEMEDICINE (OUTPATIENT)
Dept: CARDIOLOGY | Facility: CLINIC | Age: 85
End: 2025-01-09
Payer: MEDICARE

## 2025-01-09 DIAGNOSIS — Z95.2 S/P TAVR (TRANSCATHETER AORTIC VALVE REPLACEMENT): Primary | ICD-10-CM

## 2025-01-09 NOTE — PROGRESS NOTES
Structural Heart Follow up visit    Aure Hdez is a 84 y.o. female presents today for 1 month follow up s/p Ruth RAMO      denies SOB,CATHERINE, fatigue  Recent Hospitalizations  No    patient with   Past Medical History:   Diagnosis Date    Acute upper respiratory infection, unspecified 01/02/2019    Acute URI    Anosmia 12/11/2019    Loss of smell    Body mass index (BMI) 26.0-26.9, adult 11/30/2022    BMI 26.0-26.9,adult    Body mass index (BMI) 27.0-27.9, adult 11/02/2022    BMI 27.0-27.9,adult    Body mass index (BMI)30.0-30.9, adult 06/18/2020    BMI 30.0-30.9,adult    Cellulitis of external ear, unspecified ear 06/30/2014    Cellulitis of earlobe    Cellulitis of face 06/24/2015    Cellulitis, face    Cervicalgia 07/21/2021    Neck pain on right side    Depression, unspecified 01/03/2022    Controlled depression    Diarrhea, unspecified 11/21/2016    Diarrhea in adult patient    Encounter for immunization 10/19/2016    Need for prophylactic vaccination and inoculation against influenza    Fatty (change of) liver, not elsewhere classified 02/01/2018    Fatty (change of) liver, not elsewhere classified    Hordeolum externum unspecified eye, unspecified eyelid 06/24/2015    Stye    Mild intermittent asthma with (acute) exacerbation ([facility]) 01/02/2019    Mild intermittent asthma with acute exacerbation    Nausea with vomiting, unspecified 11/16/2016    Non-intractable vomiting with nausea, unspecified vomiting type    Nonrheumatic aortic (valve) stenosis 03/23/2018    Aortic stenosis, severe    Other chest pain 09/16/2022    Chest heaviness    Other conditions influencing health status 01/14/2019    History of cough    Other forms of angina pectoris 06/19/2020    Equivalent angina    Other specified abnormal findings of blood chemistry     Abnormal liver function test    Other specified disorders of Eustachian tube, unspecified ear 03/05/2015    Eustachian tube dysfunction    Other specified symptoms and signs  involving the circulatory and respiratory systems 07/27/2017    Carotid bruit    Pain in left shoulder 01/09/2020    Chronic left shoulder pain    Pain in right leg 08/05/2020    Pain of right lower extremity    Parageusia 12/11/2019    Loss of taste    Personal history of diseases of the skin and subcutaneous tissue 10/15/2015    History of seborrheic dermatitis    Personal history of other diseases of the circulatory system 02/01/2018    History of hypertension    Personal history of other diseases of the circulatory system 06/11/2015    History of abnormal electrocardiography    Personal history of other diseases of the circulatory system 05/09/2018    History of essential hypertension    Personal history of other diseases of the digestive system     History of cholelithiasis    Personal history of other diseases of the digestive system 02/01/2018    History of hiatal hernia    Personal history of other diseases of the musculoskeletal system and connective tissue 03/23/2018    H/O arthritis    Personal history of other diseases of the nervous system and sense organs 02/11/2015    History of acute conjunctivitis    Personal history of other diseases of the nervous system and sense organs 01/23/2015    History of acute otitis media    Personal history of other diseases of the nervous system and sense organs     History of sleep apnea    Personal history of other diseases of the respiratory system 10/15/2015    History of allergic rhinitis    Personal history of other diseases of the respiratory system 10/15/2015    History of chronic sinusitis    Personal history of other diseases of the respiratory system 04/16/2015    History of acute sinusitis    Personal history of other diseases of the respiratory system 02/01/2018    History of lung disease    Personal history of other diseases of the respiratory system 04/24/2017    History of acute pharyngitis    Personal history of other diseases of the respiratory system  02/28/2017    History of acute bronchitis    Personal history of other diseases of the respiratory system     History of bronchitis    Personal history of other diseases of urinary system     History of bladder problems    Personal history of other drug therapy     History of postmenopausal hormone replacement therapy    Personal history of other drug therapy     COVID-19 vaccine series completed    Personal history of other endocrine, nutritional and metabolic disease 08/03/2015    History of hyperlipidemia    Personal history of other endocrine, nutritional and metabolic disease 11/16/2016    History of hypokalemia    Personal history of other endocrine, nutritional and metabolic disease 02/01/2018    History of hyperlipidemia    Personal history of other mental and behavioral disorders     History of mental disorder    Personal history of other specified conditions 05/21/2015    History of fatigue    Personal history of other specified conditions 11/30/2022    History of dizziness    Personal history of other specified conditions 01/23/2014    History of urinary frequency    Personal history of other specified conditions 06/14/2021    History of insomnia    Personal history of other specified conditions     History of heartburn    Rash and other nonspecific skin eruption 02/13/2018    Rash    Rectal polyp     Rectal polyp    Right lower quadrant pain 11/16/2016    Abdominal pain, RLQ (right lower quadrant)    Stiffness of left shoulder, not elsewhere classified 01/09/2020    Stiff shoulder, left    Unspecified asthma, uncomplicated ([facility]) 01/14/2019    Asthma in adult without complication, unspecified asthma severity, unspecified whether persistent    Unspecified conjunctivitis 06/24/2015    Conjunctivitis of left eye    Urticaria, unspecified 02/29/2016    Hives of unknown origin    Vomiting, unspecified 11/15/2016    Dry heaves       No results found for this or any previous visit (from the past 96 hours).      Transthoracic Echo (TTE) Limited    Result Date: 12/14/2024   [facility], [address] [address]                Tel [phone] and Fax [phone] TRANSTHORACIC ECHOCARDIOGRAM REPORT  Patient Name:       WAYLON FRANCIS         Yovana Physician:    [id number] Ni Lezama MD Study Date:         12/11/2024          Ordering Provider:    [id number] MALLORY HERRON MRN/PID:            [id number]            Fellow:               [id number] Sandra Camargo MD Accession#:         [id number]        Nurse: Date of Birth/Age:  1940 / 84 years Sonographer:          Lena Ramos [facility] Gender assigned at  F                   Additional Staff: Birth: Height:             162.60 cm           Admit Date:           12/5/2024 Weight:             58.97 kg            Admission Status:     Inpatient -                                                               Routine BSA / BMI:          1.63 m2 / 22.30     Encounter#:           [id number]                     kg/m2 Blood Pressure:     /                   Department Location:  [facility]                                                               Cath Lab Study Type:    TRANSTHORACIC ECHO (TTE) LIMITED Diagnosis/ICD: Nonrheumatic aortic (valve) stenosis-I35.0 Indication:    Reshma-procedure TAVR CPT Code:      Echo Limited-26206; Color Doppler-96864; Doppler Full-07884 Patient History: Pertinent History: CAD, LBBB, AS s/p TAVR Medtronic 26 mm (3/16/2018), HTN,                    Diastolic heart failure. Study Detail: The following Echo studies were performed: 2D, M-Mode, Doppler and               color flow. Technically challenging study due to patient lying in               supine position. Unable to obtain suprasternal notch view.  PHYSICIAN INTERPRETATION: Left  Ventricle: The left ventricular systolic function is normal, with a visually estimated ejection fraction of 60-65%. There are no regional left ventricular wall motion abnormalities. The left ventricular cavity size is moderately dilated. There is a false tendon visualized in the left ventricle. Left ventricular diastolic filling was not assessed. Left Atrium: The left atrium is severely dilated. Right Ventricle: The right ventricle is normal in size. There is normal right ventricular global systolic function. A device is visualized in the right ventricle. Right Atrium: The right atrium is normal in size. There is a device visualized in the right atrium. Aortic Valve: There is a prosthetic aortic valve present. The aortic valve dimensionless index is 0.45. There is severe aortic valve regurgitation. The peak instantaneous gradient of the aortic valve is 61 mmHg. The mean gradient of the aortic valve is 37 mmHg. Baseline: there is a 26mm Medtronic Evolut TAVR with gradients of 61/37mmHg and DI of 0.35 and severe AI ( valvular AI) . Some of the incresae AVR gradients are due to increased flow from the severe AI. There is a filamentous appearing mobile echodensity ( possibly 2cm in length) noted below the prosthetic aortic valve in the LVOT that appears unchanged compared to the prior study. Proceeded to valve in valve TAVR. Mitral Valve: The mitral valve is mildly thickened. There is moderate mitral annular calcification. There is mild mitral valve regurgitation. Tricuspid Valve: The tricuspid valve is structurally normal. There is trace tricuspid regurgitation. The right ventricular systolic pressure is unable to be estimated. Pulmonic Valve: The pulmonic valve is not well visualized. There is trace pulmonic valve regurgitation. Pericardium: Trivial to small pericardial effusion. Pleural: There is left pleural effusion. Aorta: The aortic root was not well visualized. Systemic Veins: The inferior vena cava appears  normal in size, with IVC inspiratory collapse greater than 50%. In comparison to the previous echocardiogram(s): Compared with study dated 10/28/2024,. Compared with the prior exam from 10/28/2024 the prior TAVR gradients were higher at 80.2/41.7mmHg ( with DI of 0.3) and there was only moderate AI at that time. The gradients were likely higher due to more extensive interrogation of the valve in a non-periprocedural echo. The LV systolic function appears to be similar and the mobile echodensity seen beneath the AV was already present on the prior study.  Post Transcatheter Aortic Valve Placement (TAVR): The peak instantaneous gradient of the aortic valve is 12.8 mmHg. The mean gradient of the aortic valve is 6.0 mmHg. There is an Sams Marie Ultra aortic valve in valve replacement, with a 20 mm reported size. The left ventricular systolic function is normal. The left ventricular cavity size is normal. There is mild mitral valve regurgitation. There is a trivial to small pericardial effusion.  CONCLUSIONS:  1. The left ventricular systolic function is normal, with a visually estimated ejection fraction of 60-65%.  2. Left ventricular cavity size is moderately dilated.  3. There is normal right ventricular global systolic function.  4. The left atrium is severely dilated.  5. There is moderate mitral annular calcification.  6. Mild mitral valve regurgitation.  7. Baseline: there is a 26mm Medtronic Evolut TAVR with gradients of 61/37mmHg and DI of 0.35 and severe AI ( valvular AI) . Some of the incresae AVR gradients are due to increased flow from the severe AI. There is a filamentous appearing mobile echodensity ( possibly 2cm in length) noted below the prosthetic aortic valve in the LVOT that appears unchanged compared to the prior study.     Proceeded to valve in valve TAVR.  8. S/p 20mm Sams Marie ultra TAVR in TAVR with gradients of 13/6mmHg with no obvious AI and no significant change in the pericaridal  effusion. NOte that the previously noted mobile echodensity beneath the TAVR is still present at the conclusion of the case.  9. Compared with the prior exam from 10/28/2024 the prior TAVR gradients were higher at 80.2/41.7mmHg ( with DI of 0.3) and there was only moderate AI at that time. The gradients were likely higher due to more extensive interrogation of the valve in a non-periprocedural echo. The LV systolic function appears to be similar and the mobile echodensity seen beneath the AV was already present on the prior study. RECOMMENDATIONS: Utilizing an FDA cleared automated machine learning algorithm (EchoGo Heart Failure by [employer]), the analysis of the apical 4-chamber echocardiogram suggests the presence of heart failure with preserved ejection fraction (HFpEF)*. Clinical correlation looking for additional heart failure signs and symptoms is recommended, as a definite diagnosis of heart failure cannot be made by imaging alone. *Per ACC/AHA/HFSA universal diagnosis of heart failure, HFpEF is defined as 1) signs and symptoms leading to clinical diagnosis of heart failure, 2) an ejection fraction of at least 50%, and 3) evidence of elevated intra-cardiac filling pressures by echocardiography, BNP elevation, or catheterization.  QUANTITATIVE DATA SUMMARY:  2D MEASUREMENTS:          Normal Ranges: LVEDV Index:     77 ml/m2  LV SYSTOLIC FUNCTION BY 2D PLANIMETRY (MOD):                      Normal Ranges: EF-A4C View:    62 % (>=55%) EF-A2C View:    60 % EF-Biplane:     60 % EF-Visual:      63 % LV EF Reported: 63 %  AORTIC VALVE:                                Normal Ranges: AoV Vmax:                          3.92 m/s  (<=1.7m/s) AoV Vmax Post TAVR:                1.79 m/s  (<=1.7m/s) AoV Peak P.5 mmHg (<20mmHg) AoV Peak PG Post TAVR:             12.8 mmHg (<20mmHg) AoV Mean P.0 mmHg (1.7-11.5mmHg) AoV Mean PG Post TAVR:             6.0 mmHg   (1.7-11.5mmHg) LVOT Max Jax:                      1.68 m/s  (<=1.1m/s) LVOT Max Jax Post TAVR:            1.24 m/s  (<=1.1m/s) AoV VTI:                           94.50 cm  (18-25cm) AoV VTI Post TAVR:                 37.10 cm  (18-25cm) LVOT VTI:                          42.10 cm LVOT VTI Post TAVR:                28.40 cm LVOT Diameter:                     1.70 cm   (1.8-2.4cm) AoV Area, VTI:                     1.01 cm2  (2.5-5.5cm2) AoV Area,Vmax:                     0.97 cm2  (2.5-4.5cm2) AoV Dimensionless Index:           0.45 AoV Dimensionless Index Post TAVR: 0.77  AORTIC INSUFFICIENCY: AI Vmax:       3.73 m/s AI Half-time:  143 msec AI Decel Rate: 765.00 cm/s2  RIGHT VENTRICLE: TAPSE: 24.3 mm  TRICUSPID VALVE/RVSP:         Normal Ranges: IVC Diam:             1.70 cm  [id number] Ni Lezama MD Electronically signed on 12/11/2024 at 4:48:38 PM  ** Final **     Transthoracic Echo (TTE) Limited    Result Date: 12/14/2024   [facility], [address] [place] [address]                Tel [phone] and Fax [phone] TRANSTHORACIC ECHOCARDIOGRAM REPORT  Patient Name:       WAYLON MARC TITO Yen Physician:    [id number] Jp Rai MD Study Date:         12/12/2024          Ordering Provider:    [id number] PAUL SANCHEZ MRN/PID:            [id number]            Fellow:               [id number] Zander Fajardo MD Accession#:         [id number]        Nurse: Date of Birth/Age:  1940 / 84 years Sonographer:          Nilay Payne RDCS Gender assigned at  F                   Additional Staff: Birth: Height:             162.56 cm           Admit Date: Weight:             58.06 kg            Admission Status:     Inpatient -                                                                Routine BSA / BMI:          1.62 m2 / 21.97     Encounter#:           [id number]                     kg/m2 Blood Pressure:     128/67 mmHg         Department Location:  [facility]                                                               Non Invasive Study Type:    TRANSTHORACIC ECHO (TTE) LIMITED Diagnosis/ICD: Nonrheumatic aortic (valve) stenosis-I35.0 Indication:    s/p TAVR CPT Code:      Echo Limited-37249; Doppler Limited-34799; Color Doppler-49176 Patient History: Pertinent History: CAD, LBBB, s/p TAVR Medtronic 26mm 3/16/18, HTN, CHF, s/p                    TAVR in TAVR: Marie 20+2cc mm (12/11/24). Study Detail: The following Echo studies were performed: 2D, Doppler, M-Mode and               color flow.  PHYSICIAN INTERPRETATION: Left Ventricle: Left ventricular ejection fraction is low normal, by visual estimate at 50-55%. There is mild eccentric left ventricular hypertrophy. There are no regional left ventricular wall motion abnormalities. The left ventricular cavity size is normal. There is normal septal and normal posterior left ventricular wall thickness. Left ventricular diastolic filling was not assessed. Left Atrium: The left atrium is severely dilated. There is a small atrial septal defect. Right Ventricle: The right ventricle is normal in size. There is normal right ventricular global systolic function. Right Atrium: The right atrium was not assessed. Aortic Valve: There is a prosthetic aortic valve present. The aortic valve dimensionless index is 0.44. There is an Sams transcatheter aortic valve bioprosthesis with a 20 mm reported size. There is no periprosthetic aortic valve regurgitation. Echo findings are consistent with normal aortic valve prosthesis structure and function. There is no evidence of aortic valve regurgitation. The peak instantaneous gradient of the aortic valve is 33 mmHg. The mean gradient of the aortic valve is 21 mmHg. Vmax of 2.66.  Mitral Valve: The mitral valve is mildly thickened. There is mild to moderate mitral annular calcification. There is mild to moderate mitral valve regurgitation. Tricuspid Valve: The tricuspid valve is structurally normal. There is trace to mild tricuspid regurgitation. The Doppler estimated RVSP is slightly elevated at 32.1 mmHg. Pulmonic Valve: The pulmonic valve is structurally normal. There is physiologic pulmonic valve regurgitation. Pericardium: Small pericardial effusion anterior to the right ventricle. There is no evidence of cardiac tamponade. Pleural: There is a moderate left pleural effusion. Aorta: The aortic root was not assessed. Systemic Veins: The inferior vena cava appears normal in size, with IVC inspiratory collapse greater than 50%. In comparison to the previous echocardiogram(s): Compared with study dated 12/11/2024,. S/p TAV with 20mm Marie 3 Ultra in TAV (Medtronic 26mm 3/16/18); Current TAVR gradients are now lower at 21mmhg.  CONCLUSIONS:  1. Left ventricular ejection fraction is low normal, by visual estimate at 50-55%.  2. There is normal right ventricular global systolic function.  3. The left atrium is severely dilated.  4. There is a moderate pleural effusion.  5. There is no evidence of cardiac tamponade.  6. Mild to moderate mitral valve regurgitation.  7. Slightly elevated right ventricular systolic pressure.  8. There is an Sams transcatheter aortic valve bioprosthesis with a 20 mm reported size.  9. 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 This echodenisty is known prior to recent TAV in TAV. 10. S/p TAV with 20mm Marie 3 Ultra in TAV (Medtronic 26mm 3/16/18); Current TAVR gradients are now lower at 21mmhg.     11. Echo findings are consistent with normal aortic valve prosthesis structure and function.  RECOMMENDATIONS: Utilizing an FDA cleared automated machine learning algorithm (EchoGo Heart Failure by [employer]), the analysis of the apical 4-chamber echocardiogram suggests the presence of heart failure with preserved ejection fraction (HFpEF)*. Clinical correlation looking for additional heart failure signs and symptoms is recommended, as a definite diagnosis of heart failure cannot be made by imaging alone. *Per ACC/AHA/HFSA universal diagnosis of heart failure, HFpEF is defined as 1) signs and symptoms leading to clinical diagnosis of heart failure, 2) an ejection fraction of at least 50%, and 3) evidence of elevated intra-cardiac filling pressures by echocardiography, BNP elevation, or catheterization.  QUANTITATIVE DATA SUMMARY:  2D MEASUREMENTS:          Normal Ranges: IVSd:            0.88 cm  (0.6-1.1cm) LVPWd:           0.88 cm  (0.6-1.1cm) LVIDd:           5.09 cm  (3.9-5.9cm) LVIDs:           3.51 cm LV Mass Index:   98 g/m2 LVEDV Index:     54 ml/m2 LV % FS          31.0 %  LV SYSTOLIC FUNCTION BY 2D PLANIMETRY (MOD):                      Normal Ranges: EF-A4C View:    65 % (>=55%) EF-A2C View:    57 % EF-Biplane:     61 % EF-Visual:      53 % LV EF Reported: 53 %  LV DIASTOLIC FUNCTION:             Normal Ranges: MV Peak E:             0.91 m/s    (0.7-1.2 m/s) MV Peak A:             1.13 m/s    (0.42-0.7 m/s) E/A Ratio:             0.80        (1.0-2.2) MV A Dur:              117.03 msec PulmV Sys Jax:         46.59 cm/s PulmV Harris Jax:        44.77 cm/s PulmV S/D Jax:         1.04 PulmV A Revs Jax:      20.00 cm/s PulmV A Revs Dur:      102.76 msec  MITRAL VALVE:          Normal Ranges: MV DT:        271 msec (150-240msec)  AORTIC VALVE:                      Normal Ranges: AoV Vmax:                2.85 m/s  (<=1.7m/s) AoV Peak P.6 mmHg (<20mmHg) AoV Mean P.9 mmHg (1.7-11.5mmHg) LVOT Max Jax:            1.15 m/s  (<=1.1m/s) AoV VTI:                 57.50 cm   (18-25cm) LVOT VTI:                25.58 cm LVOT Diameter:           1.83 cm   (1.8-2.4cm) AoV Area, VTI:           1.17 cm2  (2.5-5.5cm2) AoV Area,Vmax:           1.06 cm2  (2.5-4.5cm2) AoV Dimensionless Index: 0.44  RIGHT VENTRICLE: TAPSE: 22.2 mm RV s'  0.13 m/s  TRICUSPID VALVE/RVSP:          Normal Ranges: Peak TR Velocity:     2.70 m/s RV Syst Pressure:     32 mmHg  (< 30mmHg)  Pulmonary Veins: PulmV A Revs Dur: 102.76 msec PulmV A Revs Jax: 20.00 cm/s PulmV Harris Jax:   44.77 cm/s PulmV S/D Jax:    1.04 PulmV Sys Jax:    46.59 cm/s  AORTA: Asc Ao Diam 2.93 cm  [id number] Jp Rai MD Electronically signed on 12/12/2024 at 12:01:37 PM  ** Final **     Transesophageal Echo (SEN)    Result Date: 11/20/2024   [facility], [address] [place] [address]                Tel [phone] and Fax [phone] TRANSESOPHAGEAL ECHOCARDIOGRAM REPORT  Patient Name:       WAYLON Yen Physician:    [id number] Ni Lezama MD Study Date:         11/20/2024          Ordering Provider:    [id number] MOISES CHAVEZ MRN/PID:            [id number]            Fellow:               [id number] Homero Hare MD Accession#:         [id number]        Nurse:                Tsering Kelley RN Date of Birth/Age:  1940 / 84 years Sonographer: Gender assigned at  F                   Additional Staff:     Светлана Lira RN Birth: Height:             162.56 cm           Admit Date:           11/19/2024 Weight:             61.20 kg            Admission Status:     Inpatient -                                                               Routine BSA / BMI:          1.65 m2 / 23.16     Encounter#:           [id number]                     kg/m2 Blood Pressure:     134/44 mmHg         Department Location:  [facility]                                                                Non Invasive Study Type:    TRANSESOPHAGEAL ECHO (SEN) Diagnosis/ICD: Nonrheumatic aortic (valve) insufficiency-I35.1 Indication:    AV regurgitation CPT Code:      Moderate Sedation Services initial 15 minutes patient >5                years-49938 Patient History: Allergies:         Lovastatin, Montelukast. Pertinent History: CAD, LBBB, AS s/p TAVR(2018), HTN, diastolic HF, MR, GERD,                    depression, SDH, syncope.  PHYSICIAN INTERPRETATION: SEN Medication: The pharynx was anesthetized with viscous lidocaine and topex spray. The patient was sedated using moderate sedation. Midazolam and Fentanyl was used to sedate the patient for this exam. SEN Procedure: Unable to insert the probe. Left Ventricle: The left ventricular cavity size was not assessed. Left ventricular diastolic filling was not assessed. Left Atrium: The left atrium was not assessed. Left atrial appendage was not assessed. Right Ventricle: The right ventricle was not assessed. Right ventricular systolic function not assessed. Right Atrium: The right atrium was not assessed. Aortic Valve: The aortic valve was not assessed. Aortic valve regurgitation was not assessed. Mitral Valve: The mitral valve was not assessed. Mitral valve regurgitation was not assessed. Tricuspid Valve: The tricuspid valve was not assessed. Tricuspid regurgitation was not assessed. Pulmonic Valve: The pulmonic valve was not assessed. Pulmonic valve regurgitation was not assessed. Pericardium: Pericardial effusion was not assessed. Aorta: The aortic root was not assessed.  CONCLUSIONS:  1. Unable to insert SEN probe. QUANTITATIVE DATA SUMMARY:  [id number] Ni Lezama MD Electronically signed on 11/20/2024 at 4:52:21 PM  ** Final **     Transthoracic echo (TTE) complete    Result Date: 10/28/2024        [facility]       [address],  [place], [place] [address]        Tel [phone] and Fax [phone] TRANSTHORACIC ECHOCARDIOGRAM REPORT  Patient Name:      WAYLON TRAN TITO          Reading Physician:    [id number] Dieter Crespo MD Study Date:        10/28/2024           Ordering Provider:    [id number] DARIA STEWARD MRN/PID:           [id number]             Fellow: Accession#:        [id number]         Nurse: Date of Birth/Age: 1940 / 84 years  Sonographer:          Tawana Tomlinson [facility],                                                               CS Gender:            F                    Additional Staff: Height:            162.56 cm            Admit Date:           10/28/2024 Weight:            63.50 kg             Admission Status:     Outpatient BSA / BMI:         1.68 m2 / 24.03      Encounter#:           [id number]                    kg/m2 Blood Pressure:    188/53 mmHg          Department Location:  [facility] Study Type:    TRANSTHORACIC ECHO (TTE) COMPLETE Diagnosis/ICD: Presence of prosthetic heart valve-Z95.2 Indication:    Hx TAVR/SOB CPT Code:      Echo Complete w Full Doppler-12169  Study Detail: The following Echo studies were performed: 2D, M-Mode, Doppler and               color flow. A bubble study was not performed.  PHYSICIAN INTERPRETATION: Left Ventricle: The left ventricular systolic function is normal, with a Lee's biplane calculated ejection fraction of 56%. There are no regional left ventricular wall motion abnormalities. The left ventricular cavity size is normal. Spectral Doppler shows a restrictive pattern of left ventricular diastolic filling. Left Atrium: The left atrium is moderately dilated. There is a moderately sized atrial septal defect with predominantly left to right shunting across the atrial septum; demonstrated using color Doppler. A bubble study using agitated saline was not performed. Right  Ventricle: The right ventricle is normal in size. There is normal right ventricular global systolic function. Right Atrium: The right atrium is normal in size. Aortic Valve: There is a prosthetic aortic valve present. The aortic valve dimensionless index is 0.30. There is a Medtronic transcatheter aortic valve replacement, with a 26 mm reported size. There is moderate lalo-prosthetic aortic valve regurgitation. There is moderate aortic valve regurgitation. The peak instantaneous gradient of the aortic valve is 80.2 mmHg. The mean gradient of the aortic valve is 41.7 mmHg. Mitral Valve: The mitral valve is moderately thickened. There is moderate mitral annular calcification. There is moderate mitral valve regurgitation which is centrally directed. Tricuspid Valve: The tricuspid valve is structurally normal. There is mild tricuspid regurgitation. Pulmonic Valve: The pulmonic valve is structurally normal. There is physiologic pulmonic valve regurgitation. Pericardium: Trivial pericardial effusion. Pleural: There is a large left pleural effusion. Aorta: The aortic root is abnormal. There is moderate dilatation the aortic root. Pulmonary Artery: The tricuspid regurgitant velocity is 3.85 m/s, and with an estimated right atrial pressure of 15 mmHg, the estimated pulmonary artery pressure is severely elevated with the RVSP at 74.4 mmHg. In comparison to the previous echocardiogram(s): Compared with study dated 4/19/2023, Deree of AI has increased, trans-AVR gradients are severely increased, RVSP is now severely increased. MR has increased. Mobil eechodenisty is again seen in the LVOT - suggest SEN.  CONCLUSIONS:  1. The left ventricular systolic function is normal, with a Lee's biplane calculated ejection fraction of 56%.  2. Spectral Doppler shows a restrictive pattern of left ventricular diastolic filling.  3. There is normal right ventricular global systolic function.  4. The left atrium is moderately dilated.  5.  There is a large pleural effusion.  6. The mitral valve is moderately thickened.  7. There is moderate mitral annular calcification.  8. Moderate mitral valve regurgitation.  9. There is a Medtronic transcatheter aortic valve replacement, with a 26mm reported size. 10. Moderate aortic valve regurgitation. 11. Severely elevated pulmonary artery pressure. 12. There is a moderately sized atrial septal defect. 13. There is moderate dilatation of the aortic root. QUANTITATIVE DATA SUMMARY:  2D MEASUREMENTS:          Normal Ranges: RVIDd:           2.18 cm  (0.9-3.6cm) IVSd:            1.34 cm  (0.6-1.1cm) LVPWd:           1.07 cm  (0.6-1.1cm) LVIDd:           4.46 cm  (3.9-5.9cm) LVIDs:           2.84 cm LV Mass Index:   117 g/m2 LVEDV Index:     49 ml/m2 LV % FS          36.4 %  LA VOLUME:                    Normal Ranges: LA Vol A4C:        66.3 ml    (22+/-6mL/m2) LA Vol A2C:        72.8 ml LA Vol BP:         71.3 ml LA Vol Index A4C:  39.4ml/m2 LA Vol Index A2C:  43.3 ml/m2 LA Vol Index BP:   42.4 ml/m2 LA Area A4C:       20.1 cm2 LA Area A2C:       21.6 cm2 LA Major Axis A4C: 5.2 cm LA Major Axis A2C: 5.4 cm LA Vol A4C:        63.4 ml LA Vol A2C:        69.8 ml LA Vol Index BSA:  39.6 ml/m2  RA VOLUME BY A/L METHOD:            Normal Ranges: RA Vol A4C:              42.2 ml    (8.3-19.5ml) RA Vol Index A4C:        25.1 ml/m2 RA Area A4C:             16.2 cm2 RA Major Axis A4C:       5.3 cm  LV SYSTOLIC FUNCTION BY 2D PLANIMETRY (MOD):                      Normal Ranges: EF-A4C View:    57 % (>=55%) EF-A2C View:    59 % EF-Biplane:     56 % LV EF Reported: 56 %  LV DIASTOLIC FUNCTION:            Normal Ranges: MV Peak E:             1.18 m/s   (0.7-1.2 m/s) MV Peak A:             0.91 m/s   (0.42-0.7 m/s) E/A Ratio:             1.29       (1.0-2.2) MV e'                  0.074 m/s  (>8.0) MV lateral e'          0.07 m/s MV medial e'           0.08 m/s MV A Dur:              98.95 msec E/e' Ratio:            15.82       (<8.0)  MITRAL VALVE:          Normal Ranges: MV DT:        148 msec (150-240msec)  MITRAL INSUFFICIENCY:            Normal Ranges: PISA Radius:          0.7 cm MR Alias Jax:         31.2 cm/s MR Flow Rt:           84.31 ml/s  AORTIC VALVE:                      Normal Ranges: AoV Vmax:                4.48 m/s  (<=1.7m/s) AoV Peak P.2 mmHg (<20mmHg) AoV Mean P.7 mmHg (1.7-11.5mmHg) LVOT Max Jax:            1.27 m/s  (<=1.1m/s) AoV VTI:                 99.25 cm  (18-25cm) LVOT VTI:                29.62 cm AoV Dimensionless Index: 0.30  AORTIC INSUFFICIENCY: AI Vmax:       4.89 m/s AI Half-time:  158 msec AI Decel Time: 544 msec AI Decel Rate: 898.89 cm/s2  RIGHT VENTRICLE: RV Basal 3.11 cm RV Mid   2.18 cm RV Major 6.2 cm TAPSE:   27.3 mm RV s'    0.13 m/s  TRICUSPID VALVE/RVSP:          Normal Ranges: Peak TR Velocity:     3.85 m/s Est. RA Pressure:     15 mmHg RV Syst Pressure:     74 mmHg  (< 30mmHg) IVC Diam:             2.28 cm  PULMONIC VALVE:          Normal Ranges: PV Max Jax:     0.9 m/s  (0.6-0.9m/s) PV Max P.9 mmHg  AORTA: Asc Ao Diam 3.49 cm  [id number] Dieter Crespo MD Electronically signed on 10/28/2024 at 12:53:36 PM  ** Final **                  Heart Failure Follow up    NYHA class 1    Edema Denies  Dyspnea on Exertion Denies  Fatigue Improved  Exercise Intolerance Improved  Orthopnea Denies  PND Denies    Chest pain No  Syncope No  Palpitations No    All organ systems normal               KCCQ Questionnaire  1  Heart failure affects different people in different ways. Some feel shortness of breath while others feel fatigue. Please indicate how much you are limited by heart failure (shortness of breath or fatigue) in your ability to do the following activities over the past 2 weeks.     A.) Showering/bathing  5. Not at All  B.) Walking 1 block on level ground 5. Not at All  C.) Hurrying or Jogging   6. Limited for other reastons    2.  Over the past 2 weeks, how  "many times did you have swelling in your feet, ankles or legs when you woke up in the morning? 5. Never    3.  Over the past 2 weeks, on average, how many times has fatigue limited your ability to do what you wanted? 7. Never    4.  Over the past 2 weeks, on average, how many times has shortness of breath limited your ability to do what you wanted? 7. Never    5.  Over the past 2 weeks, on average, how many times have you been forced to sleep sitting up in a chair or with at least 3 pillows to prop you up because of shortness of breath? Never    6. Over the past 2 weeks, how much has your heart failure limited your enjoyment of life? It has not limited my enjoyment of life    7. If you had to spend the rest of your life with your heart failure the way it is right now, how would you feel about this? 5. Completely satisfied    8. How much does your heart failure affect your lifestyle? Please indicate how your heart failure may have limited yourparticipation in the following activities over the past 2 weeks    A.)  Hobbies, recreational activities  5. Did not limit at all    B.) Working or doing household chores  5. Did not limit at all    C.) Visiting family or friends out of your home  5. Did not limit at all      Aureadebayo Hdez is an 83yo female with a past medical history of aortic stenosis s/p TAVR, CAD, LBBB, HTN, mitral regurg, s/p TAVR 2018.  Presents today for the 1 month follow-up s/p TAV in TAVR - 20mm Marie 3 Ultra +2cc on 12/11/24 with Farshad Preciado and Francis.    Impression  - 1 month s/p TAVR   - states she is feeling great.  \"I feel like my old self again\"  - HF symptoms:  denies all  - vitals:  stable  - groins:  healed  - Overall sounds to be doing extremely well 1 month post TAVR.  Encouraged to continue to remain active.    1 month ECHO - needed for TVT registry      Plan:   Schedulers to assist in arrange 1 month ECHO  Cont ASA for life  Cont current medication regimen  Cont to increase activity  f/u " with Structural NP in 1 year - ECHO can be closer to home  f/u with Dr. Guzman (Primary Cards) as scheduled or in 6-10 weeks  Life-long Dental SBE prophylaxis needed - dentist orders    I personally spent 16 minutes with this patient via phone (unable to perform video visit), of which >50% of time was spent counseling and coordination of care      Jose Hawthorne MSN, [facility]  Acute Care Nurse Practitioner  Structural Heart / TAVR Team  [phone] (ph)  [phone] (fax)

## 2025-01-16 ENCOUNTER — APPOINTMENT (OUTPATIENT)
Dept: PRIMARY CARE | Facility: CLINIC | Age: 85
End: 2025-01-16
Payer: MEDICARE

## 2025-01-23 ENCOUNTER — OFFICE VISIT (OUTPATIENT)
Dept: CARDIOLOGY | Facility: CLINIC | Age: 85
End: 2025-01-23
Payer: MEDICARE

## 2025-01-23 VITALS
DIASTOLIC BLOOD PRESSURE: 52 MMHG | WEIGHT: 134 LBS | BODY MASS INDEX: 23 KG/M2 | SYSTOLIC BLOOD PRESSURE: 138 MMHG | HEART RATE: 65 BPM

## 2025-01-23 DIAGNOSIS — Z95.2 S/P TAVR (TRANSCATHETER AORTIC VALVE REPLACEMENT): ICD-10-CM

## 2025-01-23 DIAGNOSIS — I44.7 LEFT BUNDLE BRANCH BLOCK (LBBB): ICD-10-CM

## 2025-01-23 DIAGNOSIS — I10 PRIMARY HYPERTENSION: ICD-10-CM

## 2025-01-23 DIAGNOSIS — I25.10 ASHD (ARTERIOSCLEROTIC HEART DISEASE): Primary | ICD-10-CM

## 2025-01-23 DIAGNOSIS — I35.0 NONRHEUMATIC AORTIC VALVE STENOSIS: ICD-10-CM

## 2025-01-23 PROCEDURE — 99214 OFFICE O/P EST MOD 30 MIN: CPT | Performed by: STUDENT IN AN ORGANIZED HEALTH CARE EDUCATION/TRAINING PROGRAM

## 2025-01-23 ASSESSMENT — ENCOUNTER SYMPTOMS
CONSTITUTIONAL NEGATIVE: 1
RESPIRATORY NEGATIVE: 1
ENDOCRINE NEGATIVE: 1
GASTROINTESTINAL NEGATIVE: 1
PND: 0
NEUROLOGICAL NEGATIVE: 1
PSYCHIATRIC NEGATIVE: 1
ORTHOPNEA: 0
NEAR-SYNCOPE: 0
SHORTNESS OF BREATH: 0
MUSCULOSKELETAL NEGATIVE: 1
EYES NEGATIVE: 1
HEMATOLOGIC/LYMPHATIC NEGATIVE: 1
ALLERGIC/IMMUNOLOGIC NEGATIVE: 1
SYNCOPE: 0
PALPITATIONS: 0
DYSPNEA ON EXERTION: 0

## 2025-01-23 NOTE — PROGRESS NOTES
Cardiology New Patient History and Physical    Reason for referral: history of aortic stenosis s/p valve-in valve TAVR    HPI: Aure Hdez is a 84 y.o.  female who presents today to establish care with general cardiology. Past medical history of aortic stenosis s/p TAVR, CAD, LBBB, HTN, mitral regurg, s/p TAVR 2018.  Presents today for the 1 month follow-up s/p TAV in TAVR - 20mm Marie 3 Ultra +2cc on 12/11/24 with Farshad Preciado and Francis.       Past Medical History:   - as above    Surgical History:   She has a past surgical history that includes Bladder surgery (02/01/2018); Colonoscopy (03/23/2018); Cholecystectomy (02/01/2018); Total vaginal hysterectomy (03/23/2018); Tonsillectomy (03/23/2018); Other surgical history (05/09/2018); Cardiac catheterization (N/A, 11/22/2024); Cardiac catheterization (N/A, 12/11/2024); Cardiac catheterization (N/A, 12/11/2024); and Cardiac catheterization (N/A, 12/11/2024).    Family History:   Family History   Problem Relation Name Age of Onset    Hypertension Mother      Stroke Mother      No Known Problems Father       Allergies:  Lovastatin and Montelukast     Social History:   - Non-smoker; no illicit drug use    Prior Cardiovascular Testing (personally reviewed):     TTE (12/12/2024)   1. Left ventricular ejection fraction is low normal, by visual estimate at 50-55%.   2. There is normal right ventricular global systolic function.   3. The left atrium is severely dilated.   4. There is a moderate pleural effusion.   5. There is no evidence of cardiac tamponade.   6. Mild to moderate mitral valve regurgitation.   7. Slightly elevated right ventricular systolic pressure.   8. There is an Sams transcatheter aortic valve bioprosthesis with a 20 mm reported size.   9. There is a mobile echodense material visualized on the LVOT seen attached to the base of the antrior mitral valve leaflet, DDx Subaortic membrane vs fibroelastoma, recommend further imaging modality  (SEN vs CMR). This echodenisty is known prior to recent TAV in TAV.  10. S/p TAV with 20mm Marie 3 Ultra in TAV (Medtronic 26mm 3/16/18); Current TAVR gradients are now lower at 21mmhg.  11. Echo findings are consistent with normal aortic valve prosthesis structure and function.     TAVR (12/11/2024)  1. Transcatheter aortic valve replacement with successful implantation of 20 mm Sapien3 ultra valve.     [facility] 11/22/2024   1. Right dominant coronary circulation with non-obstructive CAD,   2. LVEDP 12 mm Hg.    TTE 10/28: EF 56%, s/p TAVR with Evolut 26, severe aortic stenosis with AV gradients 80/41, Vmax 4.48, DI 0.3, moderate valvular regurgitation with P1/2T 158msec, Moderate MR. ASD with left to right shunt. Svevere pulmonary HTN w/ SPAP of 94mmHg.      Review of Systems:  Review of Systems   Constitutional: Negative.   HENT: Negative.     Eyes: Negative.    Cardiovascular:  Negative for chest pain, dyspnea on exertion, near-syncope, orthopnea, palpitations, paroxysmal nocturnal dyspnea and syncope.   Respiratory: Negative.  Negative for shortness of breath.    Endocrine: Negative.    Hematologic/Lymphatic: Negative.    Skin: Negative.    Musculoskeletal: Negative.    Gastrointestinal: Negative.    Genitourinary: Negative.    Neurological: Negative.    Psychiatric/Behavioral: Negative.     Allergic/Immunologic: Negative.        Objective     Outpatient Medications:    Current Outpatient Medications:     aspirin 81 mg chewable tablet, Chew 1 tablet (81 mg) once daily., Disp: , Rfl:     atorvastatin (Lipitor) 20 mg tablet, TAKE ONE TABLET BY MOUTH EVERY DAY, Disp: 90 tablet, Rfl: 3    cholecalciferol (Vitamin D-3) 125 MCG (5000 UT) capsule, Take 1 capsule (125 mcg) by mouth once daily., Disp: , Rfl:     clobetasol (Temovate) 0.05 % cream, Apply to affected areas of thickened skin on the arms twice daily, Disp: 60 g, Rfl: 0    famotidine (Pepcid) 20 mg tablet, Take 1 tablet (20 mg) by mouth once daily., Disp: , Rfl:      FLUoxetine (PROzac) 20 mg capsule, TAKE ONE CAPSULE BY MOUTH EVERY DAY, Disp: 90 capsule, Rfl: 1    fluticasone (Flonase Allergy Relief) 50 mcg/actuation nasal spray, Administer 1 spray into each nostril once daily. Shake gently. Before first use, prime pump. After use, clean tip and replace cap., Disp: 16 g, Rfl: 12    L.ac,bul,par,rha-B.ani,manny-inu (Probiotic Digest Supp, 6-strn,) 10 billion cell -100 mg capsule, Take 1 tablet by mouth once daily., Disp: , Rfl:     loratadine (Claritin) 10 mg tablet, Take 1 tablet (10 mg) by mouth once daily., Disp: , Rfl:     losartan (Cozaar) 25 mg tablet, Take 1 tablet (25 mg) by mouth once daily., Disp: 90 tablet, Rfl: 3    albuterol 1.25 mg/3 mL nebulizer solution, Take 3 mL (1.25 mg) by nebulization every 6 hours if needed for wheezing. (Patient not taking: Reported on 1/23/2025), Disp: , Rfl:     albuterol 90 mcg/actuation inhaler, Inhale 2 puffs every 6 hours if needed for wheezing. (Patient not taking: Reported on 1/23/2025), Disp: 18 g, Rfl: 0    nebulizers misc, 1 Device see administration instructions. Compressor and nebulizer kit (tubing and mouth piece), Disp: 1 each, Rfl: 0     Last Recorded Vitals  /52 (BP Location: Left arm, Patient Position: Sitting)   Pulse 65   Wt 60.8 kg (134 lb)   BMI 23.00 kg/m²     Physical Exam:  Physical Exam  Constitutional:       General: She is not in acute distress.  HENT:      Head: Normocephalic.      Mouth/Throat:      Mouth: Mucous membranes are moist.   Eyes:      Extraocular Movements: Extraocular movements intact.      Conjunctiva/sclera: Conjunctivae normal.   Neck:      Vascular: No carotid bruit or JVD.   Cardiovascular:      Rate and Rhythm: Normal rate and regular rhythm.      Pulses: Normal pulses.      Heart sounds: Murmur heard.      Systolic murmur is present with a grade of 2/6.   Pulmonary:      Effort: Pulmonary effort is normal. No respiratory distress.      Breath sounds: Normal breath sounds.  "  Abdominal:      General: Bowel sounds are normal. There is no distension.      Palpations: Abdomen is soft.   Musculoskeletal:         General: No swelling.      Right lower leg: No edema.      Left lower leg: No edema.   Skin:     General: Skin is warm and dry.   Neurological:      General: No focal deficit present.      Mental Status: She is alert.      Cranial Nerves: No cranial nerve deficit.      Motor: No weakness.   Psychiatric:         Mood and Affect: Mood normal.         Behavior: Behavior normal.         Lab Review:    Lab Results   Component Value Date    GLUCOSE 84 12/16/2024    CALCIUM 8.9 12/16/2024     12/16/2024    K 4.0 12/16/2024    CO2 30 12/16/2024     12/16/2024    BUN 12 12/16/2024    CREATININE 0.77 12/16/2024       Lab Results   Component Value Date    WBC 4.4 12/16/2024    HGB 9.9 (L) 12/16/2024    HCT 32.2 (L) 12/16/2024    MCV 91 12/16/2024     12/16/2024       Lab Results   Component Value Date    CHOL 130 02/08/2024    CHOL 154 07/15/2023    CHOL 153 01/31/2023     Lab Results   Component Value Date    HDL 51.9 02/08/2024    HDL 64.7 07/15/2023    HDL 57.3 01/31/2023     Lab Results   Component Value Date    LDLCALC 63 02/08/2024     Lab Results   Component Value Date    TRIG 77 02/08/2024    TRIG 86 07/15/2023    TRIG 101 01/31/2023     No components found for: \"CHOLHDL\"    Lab Results   Component Value Date     (H) 12/05/2024     (H) 11/19/2024    BNP 1,151 (H) 10/10/2024       Lab Results   Component Value Date    TSH 2.74 07/15/2023       Assessment:       Overall Plan:      Disposition: Return to cardiology clinic in *** months    Kevin Guzman MD        " "Past medical history of aortic stenosis s/p TAVR, CAD, LBBB, HTN, mitral regurg, s/p TAVR 2018.  Presents today for the 1 month follow-up s/p TAV in TAVR - 20mm Marie 3 Ultra +2cc on 12/11/24 with Farshad Preciado and Francis.     Patient recovering well after valve in valve TAVR; currently asymptomatic and euvolemic on exam.  Continue cardiac management as below.    Overall Plan:    1.  History of aortic stenosis status post TAVR in 2018; status post valve in valve TAVR 12/2024 for bioprosthetic dysfunction  - Currently asymptomatic  - Monitor clinically and with serial imaging  - Lifelong aspirin 81 mg daily  - Dental prophylaxis prior to any dental procedure in setting of bioprosthetic valve    2.  Atherosclerotic heart disease  - Nonobstructive for coronary angiography prior to most recent TAVR  - Encouraged heart healthy/Mediterranean style diet  - Aspirin 81 mg daily  - Dyslipidemia management as below    3.  Primary hypertension  - Currently well-controlled  - Continue losartan 25 mg daily    4.  Dyslipidemia  - Continue atorvastatin 20 mg daily  - Continue dietary lifestyle modifications    5.  Left bundle branch block  - Asymptomatic; monitor clinically    6. Discussed \"red flag\" symptoms that should prompt immediate medical attention; patient verbalized understanding    Disposition: Return to cardiology clinic in 6 months    Kevin Guzman MD        "

## 2025-01-23 NOTE — PATIENT INSTRUCTIONS
Thank you for your visit today. Please contact our office (via MyChart or phone) with any additional questions.     [facility] [place]    Yeni, RN/Clinic Nurse for:    Dr. Megan Webber    [address], [address]  [place] [address]    Phone: [phone] Press Option 5 then Option 3 to speak with the Clinic Nurse (Yeni)    _____    To Reach:    Billing Questions -    [phone]  Scheduling / Rescheduling -  Option 1  Refills / Medication Requests -  Option 3  General Office / [place] -  Option 4  Results -     Option 6  Medical Records -    Option 7  Repeat Options -    Option 9

## 2025-02-06 ENCOUNTER — HOSPITAL ENCOUNTER (OUTPATIENT)
Dept: CARDIOLOGY | Facility: CLINIC | Age: 85
Discharge: HOME | End: 2025-02-06
Payer: MEDICARE

## 2025-02-06 DIAGNOSIS — Z95.2 S/P TAVR (TRANSCATHETER AORTIC VALVE REPLACEMENT): ICD-10-CM

## 2025-02-06 DIAGNOSIS — I35.0 AORTIC VALVE STENOSIS, ETIOLOGY OF CARDIAC VALVE DISEASE UNSPECIFIED: ICD-10-CM

## 2025-02-06 LAB
AORTIC VALVE MEAN GRADIENT: 16 MMHG
AORTIC VALVE PEAK VELOCITY: 2.65 M/S
AV PEAK GRADIENT: 28 MMHG
AVA (PEAK VEL): 0.91 CM2
AVA (VTI): 0.96 CM2
EJECTION FRACTION APICAL 4 CHAMBER: 54.2
EJECTION FRACTION: 60 %
LEFT ATRIUM VOLUME AREA LENGTH INDEX BSA: 48.4 ML/M2
LEFT VENTRICLE INTERNAL DIMENSION DIASTOLE: 4.23 CM (ref 3.5–6)
LEFT VENTRICULAR OUTFLOW TRACT DIAMETER: 1.85 CM
MITRAL VALVE E/A RATIO: 0.8
RIGHT VENTRICLE FREE WALL PEAK S': 9 CM/S
RIGHT VENTRICLE PEAK SYSTOLIC PRESSURE: 22 MMHG
TRICUSPID ANNULAR PLANE SYSTOLIC EXCURSION: 2.1 CM

## 2025-02-06 PROCEDURE — 93306 TTE W/DOPPLER COMPLETE: CPT

## 2025-02-07 DIAGNOSIS — F32.A CONTROLLED DEPRESSION: ICD-10-CM

## 2025-02-10 RX ORDER — FLUOXETINE HYDROCHLORIDE 20 MG/1
CAPSULE ORAL
Qty: 90 CAPSULE | Refills: 1 | Status: SHIPPED | OUTPATIENT
Start: 2025-02-10

## 2025-02-25 ENCOUNTER — HOSPITAL ENCOUNTER (OUTPATIENT)
Dept: RADIOLOGY | Facility: CLINIC | Age: 85
Discharge: HOME | End: 2025-02-25
Payer: MEDICARE

## 2025-02-25 ENCOUNTER — OFFICE VISIT (OUTPATIENT)
Dept: PRIMARY CARE | Facility: CLINIC | Age: 85
End: 2025-02-25
Payer: MEDICARE

## 2025-02-25 VITALS
SYSTOLIC BLOOD PRESSURE: 128 MMHG | WEIGHT: 138 LBS | HEART RATE: 78 BPM | HEIGHT: 64 IN | DIASTOLIC BLOOD PRESSURE: 66 MMHG | BODY MASS INDEX: 23.56 KG/M2 | OXYGEN SATURATION: 99 %

## 2025-02-25 DIAGNOSIS — M54.41 ACUTE RIGHT-SIDED LOW BACK PAIN WITH RIGHT-SIDED SCIATICA: ICD-10-CM

## 2025-02-25 DIAGNOSIS — M54.41 ACUTE RIGHT-SIDED LOW BACK PAIN WITH RIGHT-SIDED SCIATICA: Primary | ICD-10-CM

## 2025-02-25 PROCEDURE — 1159F MED LIST DOCD IN RCRD: CPT | Performed by: INTERNAL MEDICINE

## 2025-02-25 PROCEDURE — 3078F DIAST BP <80 MM HG: CPT | Performed by: INTERNAL MEDICINE

## 2025-02-25 PROCEDURE — 1160F RVW MEDS BY RX/DR IN RCRD: CPT | Performed by: INTERNAL MEDICINE

## 2025-02-25 PROCEDURE — 1036F TOBACCO NON-USER: CPT | Performed by: INTERNAL MEDICINE

## 2025-02-25 PROCEDURE — 72100 X-RAY EXAM L-S SPINE 2/3 VWS: CPT

## 2025-02-25 PROCEDURE — 99213 OFFICE O/P EST LOW 20 MIN: CPT | Performed by: INTERNAL MEDICINE

## 2025-02-25 PROCEDURE — 3074F SYST BP LT 130 MM HG: CPT | Performed by: INTERNAL MEDICINE

## 2025-02-25 RX ORDER — METHYLPREDNISOLONE 4 MG/1
TABLET ORAL
Qty: 21 TABLET | Refills: 0 | Status: SHIPPED | OUTPATIENT
Start: 2025-02-25 | End: 2025-03-03

## 2025-02-25 ASSESSMENT — ENCOUNTER SYMPTOMS
DIZZINESS: 0
FATIGUE: 0
BACK PAIN: 1
SHORTNESS OF BREATH: 0

## 2025-02-25 NOTE — PROGRESS NOTES
"Subjective   Patient ID: Aure Hdez is a 84 y.o. female who presents for Other (Low back pain).    2 weeks right low back pain.  Now has pain into right groin and leg.  Constant achy pain, back and leg.  TAVR 2 months ago.  Tylenol for pain, not much relief.  Pain interferes with sleep.  Worse when laying done.  Usual household activities.  Not lifting anything heavy.  Meds reviewed.       Review of Systems   Constitutional:  Negative for fatigue.   Respiratory:  Negative for shortness of breath.    Cardiovascular:  Negative for chest pain.   Musculoskeletal:  Positive for back pain.   Neurological:  Negative for dizziness.       Objective   /66 (BP Location: Right arm, Patient Position: Sitting)   Pulse 78   Ht 1.626 m (5' 4\")   Wt 62.6 kg (138 lb)   SpO2 99%   BMI 23.69 kg/m²     Physical Exam  Constitutional:       Appearance: Normal appearance.   Musculoskeletal:      Comments: ROM right hip and leg ok.  Unable to reproduce pain.   Neurological:      General: No focal deficit present.      Mental Status: She is alert.   Psychiatric:         Mood and Affect: Mood normal.         Behavior: Behavior normal.         Thought Content: Thought content normal.         Judgment: Judgment normal.         Assessment/Plan   Problem List Items Addressed This Visit             ICD-10-CM    Acute right-sided low back pain with right-sided sciatica - Primary M54.41     Rec xrays.  Rec medrol dose pack for pain.  No NSAIDs.  Consider PT.         Relevant Medications    methylPREDNISolone (Medrol Dospak) 4 mg tablets    Other Relevant Orders    XR lumbar spine 2-3 views          "

## 2025-02-27 DIAGNOSIS — M51.362 DEGENERATION OF INTERVERTEBRAL DISC OF LUMBAR REGION WITH DISCOGENIC BACK PAIN AND LOWER EXTREMITY PAIN: ICD-10-CM

## 2025-02-27 DIAGNOSIS — M54.41 ACUTE RIGHT-SIDED LOW BACK PAIN WITH RIGHT-SIDED SCIATICA: Primary | ICD-10-CM

## 2025-03-04 ENCOUNTER — TELEPHONE (OUTPATIENT)
Dept: PRIMARY CARE | Facility: CLINIC | Age: 85
End: 2025-03-04
Payer: MEDICARE

## 2025-03-04 NOTE — TELEPHONE ENCOUNTER
----- Message from Lety AGARWAL sent at 3/4/2025  9:19 AM EST -----    ----- Message -----  From: Rito Tovar MD  Sent: 2/28/2025   8:51 AM EST  To: Lety Patterson MA    Referral in emr  ----- Message -----  From: Lety Patterson MA  Sent: 2/27/2025   4:09 PM EST  To: Rito Tovar MD    Pt aware of these results and instruction.  She would like to try PT, please place the referral  ----- Message -----  From: Rito Tovar MD  Sent: 2/27/2025  10:07 AM EST  To: Lety Patterson MA    Xrays show advanced OA L spine  Rec tylenol XS 1-2 tabs twice daily  Rec PT, ok to place order?

## 2025-03-10 LAB
ALBUMIN SERPL-MCNC: 3.9 G/DL (ref 3.6–5.1)
ALP SERPL-CCNC: 78 U/L (ref 37–153)
ALT SERPL-CCNC: 22 U/L (ref 6–29)
ANION GAP SERPL CALCULATED.4IONS-SCNC: 7 MMOL/L (CALC) (ref 7–17)
AST SERPL-CCNC: 25 U/L (ref 10–35)
BASOPHILS # BLD AUTO: 40 CELLS/UL (ref 0–200)
BASOPHILS NFR BLD AUTO: 0.7 %
BILIRUB SERPL-MCNC: 0.5 MG/DL (ref 0.2–1.2)
BUN SERPL-MCNC: 15 MG/DL (ref 7–25)
CALCIUM SERPL-MCNC: 8.3 MG/DL (ref 8.6–10.4)
CHLORIDE SERPL-SCNC: 103 MMOL/L (ref 98–110)
CHOLEST SERPL-MCNC: 150 MG/DL
CHOLEST/HDLC SERPL: 2.1 (CALC)
CO2 SERPL-SCNC: 28 MMOL/L (ref 20–32)
CREAT SERPL-MCNC: 0.78 MG/DL (ref 0.6–0.95)
EGFRCR SERPLBLD CKD-EPI 2021: 74 ML/MIN/1.73M2
EOSINOPHIL # BLD AUTO: 171 CELLS/UL (ref 15–500)
EOSINOPHIL NFR BLD AUTO: 3 %
ERYTHROCYTE [DISTWIDTH] IN BLOOD BY AUTOMATED COUNT: 14.4 % (ref 11–15)
GLUCOSE SERPL-MCNC: 81 MG/DL (ref 65–99)
HCT VFR BLD AUTO: 35.9 % (ref 35–45)
HDLC SERPL-MCNC: 73 MG/DL
HGB BLD-MCNC: 11.7 G/DL (ref 11.7–15.5)
LDLC SERPL CALC-MCNC: 60 MG/DL (CALC)
LYMPHOCYTES # BLD AUTO: 1511 CELLS/UL (ref 850–3900)
LYMPHOCYTES NFR BLD AUTO: 26.5 %
MCH RBC QN AUTO: 28.9 PG (ref 27–33)
MCHC RBC AUTO-ENTMCNC: 32.6 G/DL (ref 32–36)
MCV RBC AUTO: 88.6 FL (ref 80–100)
MONOCYTES # BLD AUTO: 388 CELLS/UL (ref 200–950)
MONOCYTES NFR BLD AUTO: 6.8 %
NEUTROPHILS # BLD AUTO: 3591 CELLS/UL (ref 1500–7800)
NEUTROPHILS NFR BLD AUTO: 63 %
NONHDLC SERPL-MCNC: 77 MG/DL (CALC)
PLATELET # BLD AUTO: 155 THOUSAND/UL (ref 140–400)
PMV BLD REES-ECKER: 9.2 FL (ref 7.5–12.5)
POTASSIUM SERPL-SCNC: 4 MMOL/L (ref 3.5–5.3)
PROT SERPL-MCNC: 6.3 G/DL (ref 6.1–8.1)
RBC # BLD AUTO: 4.05 MILLION/UL (ref 3.8–5.1)
SODIUM SERPL-SCNC: 138 MMOL/L (ref 135–146)
TRIGL SERPL-MCNC: 89 MG/DL
WBC # BLD AUTO: 5.7 THOUSAND/UL (ref 3.8–10.8)

## 2025-03-11 ENCOUNTER — EVALUATION (OUTPATIENT)
Dept: PHYSICAL THERAPY | Facility: CLINIC | Age: 85
End: 2025-03-11
Payer: MEDICARE

## 2025-03-11 DIAGNOSIS — M54.41 ACUTE RIGHT-SIDED LOW BACK PAIN WITH RIGHT-SIDED SCIATICA: ICD-10-CM

## 2025-03-11 DIAGNOSIS — M51.362 DEGENERATION OF INTERVERTEBRAL DISC OF LUMBAR REGION WITH DISCOGENIC BACK PAIN AND LOWER EXTREMITY PAIN: ICD-10-CM

## 2025-03-11 PROCEDURE — 97161 PT EVAL LOW COMPLEX 20 MIN: CPT | Mod: GP | Performed by: PHYSICAL THERAPIST

## 2025-03-11 PROCEDURE — 97110 THERAPEUTIC EXERCISES: CPT | Mod: GP | Performed by: PHYSICAL THERAPIST

## 2025-03-11 SDOH — ECONOMIC STABILITY: FOOD INSECURITY: WITHIN THE PAST 12 MONTHS, YOU WORRIED THAT YOUR FOOD WOULD RUN OUT BEFORE YOU GOT MONEY TO BUY MORE.: NEVER TRUE

## 2025-03-11 SDOH — ECONOMIC STABILITY: FOOD INSECURITY: WITHIN THE PAST 12 MONTHS, THE FOOD YOU BOUGHT JUST DIDN'T LAST AND YOU DIDN'T HAVE MONEY TO GET MORE.: NEVER TRUE

## 2025-03-11 ASSESSMENT — LIFESTYLE VARIABLES
HOW OFTEN DO YOU HAVE A DRINK CONTAINING ALCOHOL: NEVER
HOW MANY STANDARD DRINKS CONTAINING ALCOHOL DO YOU HAVE ON A TYPICAL DAY: PATIENT DOES NOT DRINK
HOW OFTEN DO YOU HAVE SIX OR MORE DRINKS ON ONE OCCASION: NEVER
AUDIT-C TOTAL SCORE: 0
SKIP TO QUESTIONS 9-10: 1

## 2025-03-11 ASSESSMENT — ENCOUNTER SYMPTOMS
OCCASIONAL FEELINGS OF UNSTEADINESS: 0
LOSS OF SENSATION IN FEET: 0
DEPRESSION: 0

## 2025-03-11 ASSESSMENT — PAIN - FUNCTIONAL ASSESSMENT: PAIN_FUNCTIONAL_ASSESSMENT: 0-10

## 2025-03-11 ASSESSMENT — PAIN SCALES - GENERAL: PAINLEVEL_OUTOF10: 0 - NO PAIN

## 2025-03-11 NOTE — PROGRESS NOTES
Physical Therapy    Physical Therapy Lumbar Spine Evaluation    Patient Name: Aure Hdez  MRN: [id number]  Today's Date: 3/11/2025  Time Calculation  Start Time: 1530  Stop Time: 1615  Time Calculation (min): 45 min  PT Evaluation Time Entry  PT Evaluation (Low) Time Entry: 35  PT Therapeutic Procedures Time Entry  Therapeutic Exercise Time Entry: 10  Payor: [employer]ARE / Plan: Method [employer] ADVANTAGE / Product Type: *No Product type* /     Reason for Referral: LBP  Referred By: TRAM Tovar  General Comment: Visit 1 off Auth    Current Problem  Problem List Items Addressed This Visit             ICD-10-CM    Acute right-sided low back pain with right-sided sciatica M54.41     Other Visit Diagnoses         Codes    Degeneration of intervertebral disc of lumbar region with discogenic back pain and lower extremity pain     M51.362           Precautions  Precautions  STEADI Fall Risk Score (The score of 4 or more indicates an increased risk of falling): 0  Precautions Comment: none     Pain  Pain Assessment: 0-10  0-10 (Numeric) Pain Score: 0 - No pain    SUBJECTIVE:   Chief complaint:  Pt reports onset of R sided low back about 4 weeks ago with no known injury. Notes pain radiated form low back to the R groin region. Notes pain was intermittent but occurred at night when laying in bed. Went to her PCP about 2 weeks ago and was prescribed steroid dose pack and pain has since resolved. Notes currently no issues but wanted to get a HEP for any future occurrence of pain.     Imaging: X-ray 2/25/25 IMPRESSION: Fairly advanced lumbar degenerative changes particularly L2-S1. Mild anterolisthesis L5-S1 from facet arthrosis.    Pain Better: no pain    Pain Worse: no pain     Prior level of function: previously independent with all prior activities  Current limitations: no limitations noted at this time.     Home setup: reviewed and no concern     Work: retired    Patients goal: get HEP for future pain    Prior tx: no prior  PT tx     Medical hx has been reviewed with the patient.     Objective:    Lower Extremity ROM: WNL unless documented below:    Lower Extremity Strength:  Date: eval Myotome RIGHT LEFT   Hip Flexion L1,2 4+ 4+   Hip ext  4+ 4+   Hip abd  5 5   Hip add  5 5   Knee Extension L3 5 5   Knee Flexion  5 5   Ankle DF L4 5 5   Ankle PF S1 5 5     Lumbar ROM:  Date: eval Percentage    Flexion 90%    Extension 75%     RIGHT LEFT   Side Bend     Rotation 75% 75%     Posture: Slight increased lordosis lumbar spine   Palpation: No tenderness to palpation     Special tests:  Lumbar: all negative     Other Measures  Oswestry Disablity Index (LEONIDAS): 0%     TREATMENT:  1 Eval  2. Instruction in a HEP:  Access Code: [email or web address]  URL: [email or web address]  Date: 03/11/2025  Prepared by:  Madeline    Exercises  - Seated Flexion Stretch  - 1 x daily - 7 x weekly - 2 sets - 10 reps  - Supine Lower Trunk Rotation  - 1 x daily - 7 x weekly - 2 sets - 10 reps  - Seated Hamstring Stretch  - 1 x daily - 7 x weekly - 1 sets - 3 reps - 30 sec  hold  - Supine Single Knee to Chest Stretch  - 1 x daily - 7 x weekly - 2 sets - 10 reps  - Standing Lumbar Extension  - 1 x daily - 7 x weekly - 2 sets - 10 reps    ASSESSEMENT  Pt is a 84 y.o. referred to therapy for dx of LBP by Rito Tovar MD . Pt presents with no pain or loss of function at this time and only displays mild loss of strength, reduced posture and decreased lumbar ROM. Pt with signs and symptoms associated with pain of a possible degenerative change in the spine.  This pt would benefit from a therapy program consisting a HEP for lumbar ROM. Pt was instructed in a HEP today and provided a handout   Complexity: Low  Clinical presentation: Stable and/or uncomplicated characteristics  The physical therapy prognosis is good for the patient to achieve their goals.   The pt tolerated therapy treatment today well with no adverse effects.  Personal Factors/Barriers to  therapy include:  none     PLAN  The pt will be seen for eval and HEP only today.      The pt has been educated about the risks and benefits of physical therapy including manual therapy treatments. Pt agrees with POC and gives consent for treatment.     The patient will benefit from physical therapy treatment to include:  a home exercise program.     Goals:   No goals established.

## 2025-03-17 ENCOUNTER — APPOINTMENT (OUTPATIENT)
Dept: PRIMARY CARE | Facility: CLINIC | Age: 85
End: 2025-03-17
Payer: MEDICARE

## 2025-03-17 VITALS
OXYGEN SATURATION: 99 % | SYSTOLIC BLOOD PRESSURE: 134 MMHG | WEIGHT: 137 LBS | HEIGHT: 64 IN | HEART RATE: 63 BPM | DIASTOLIC BLOOD PRESSURE: 78 MMHG | BODY MASS INDEX: 23.39 KG/M2

## 2025-03-17 DIAGNOSIS — I10 PRIMARY HYPERTENSION: ICD-10-CM

## 2025-03-17 DIAGNOSIS — M85.80 OSTEOPENIA, UNSPECIFIED LOCATION: ICD-10-CM

## 2025-03-17 DIAGNOSIS — E78.2 MIXED HYPERLIPIDEMIA: ICD-10-CM

## 2025-03-17 DIAGNOSIS — H04.203 WATERY EYES: ICD-10-CM

## 2025-03-17 DIAGNOSIS — M51.360 DEGENERATION OF INTERVERTEBRAL DISC OF LUMBAR REGION WITH DISCOGENIC BACK PAIN: Primary | ICD-10-CM

## 2025-03-17 ASSESSMENT — ENCOUNTER SYMPTOMS
FATIGUE: 0
MYALGIAS: 0
JOINT SWELLING: 0
BACK PAIN: 0
JOINT WARMTH: 0
STIFFNESS: 1
DIZZINESS: 0
SHORTNESS OF BREATH: 0
HYPERTENSION: 1

## 2025-03-17 NOTE — PROGRESS NOTES
"Subjective   Patient ID: Aure Hdez is a 84 y.o. female who presents for Follow-up chronic medical problems.    Back pain resolved with steroids and PT.  Now has HEP.  Eyes water all the time.  No pain or change in vision.  Xrays reviewed.    Arthritis  Presents for follow-up visit. She complains of stiffness. She reports no pain, joint swelling or joint warmth. The symptoms have been resolved. Pertinent negatives include no fatigue. Compliance with total regimen is %. Compliance with medications is %.   Hypertension  This is a chronic problem. The current episode started more than 1 year ago. The problem has been resolved since onset. The problem is controlled. Pertinent negatives include no chest pain or shortness of breath. The current treatment provides significant improvement. There are no compliance problems.    Hyperlipidemia  This is a chronic problem. The current episode started more than 1 year ago. The problem is controlled. Recent lipid tests were reviewed and are low. Pertinent negatives include no chest pain, myalgias or shortness of breath. Current antihyperlipidemic treatment includes statins. The current treatment provides significant improvement of lipids. There are no compliance problems.         Review of Systems   Constitutional:  Negative for fatigue.   Respiratory:  Negative for shortness of breath.    Cardiovascular:  Negative for chest pain.   Musculoskeletal:  Positive for arthritis and stiffness. Negative for back pain, joint swelling and myalgias.   Neurological:  Negative for dizziness.       Objective   /78 (BP Location: Right arm, Patient Position: Sitting)   Pulse 63   Ht 1.626 m (5' 4\")   Wt 62.1 kg (137 lb)   SpO2 99%   BMI 23.52 kg/m²     Physical Exam  Constitutional:       Appearance: Normal appearance.   Cardiovascular:      Rate and Rhythm: Normal rate and regular rhythm.      Pulses: Normal pulses.      Heart sounds: Normal heart sounds.   Pulmonary:    "   Effort: Pulmonary effort is normal.      Breath sounds: Normal breath sounds.   Neurological:      General: No focal deficit present.      Mental Status: She is alert.      Motor: No weakness.      Gait: Gait normal.   Psychiatric:         Mood and Affect: Mood normal.         Behavior: Behavior normal.         Thought Content: Thought content normal.         Judgment: Judgment normal.         Assessment/Plan   Problem List Items Addressed This Visit             ICD-10-CM    Hyperlipidemia E78.5     Lipids, LDL at goal with statin.  Recommend low fat/cholesterol diet.           Relevant Orders    Lipid Panel    ALT    TSH with reflex to Free T4 if abnormal    Osteopenia M85.80     Continue D3.         Relevant Orders    Vitamin D 25-Hydroxy,Total (for eval of Vitamin D levels)    Primary hypertension I10     BP at goal with current medications.  Rec low salt diet.  Check BP at home, goal < 140/90.           Relevant Orders    Basic metabolic panel    Urinalysis with Reflex Microscopic    Degeneration of intervertebral disc of lumbar region with discogenic back pain - Primary M51.360     Condition improved with medrol and PT.  Continue daily HEP.         Watery eyes H04.203     Stop claritin, ? Side effects.  Rec referral to ophthalmology.         Relevant Orders    Referral to Ophthalmology

## 2025-03-19 ENCOUNTER — APPOINTMENT (OUTPATIENT)
Dept: PHYSICAL THERAPY | Facility: CLINIC | Age: 85
End: 2025-03-19
Payer: MEDICARE

## 2025-04-07 ENCOUNTER — APPOINTMENT (OUTPATIENT)
Dept: CARDIOLOGY | Facility: HOSPITAL | Age: 85
End: 2025-04-07
Payer: MEDICARE

## 2025-04-07 DIAGNOSIS — Z95.2 S/P TAVR (TRANSCATHETER AORTIC VALVE REPLACEMENT): ICD-10-CM

## 2025-04-10 DIAGNOSIS — E78.2 MIXED HYPERLIPIDEMIA: ICD-10-CM

## 2025-04-10 RX ORDER — ATORVASTATIN CALCIUM 20 MG/1
20 TABLET, FILM COATED ORAL DAILY
Qty: 90 TABLET | Refills: 3 | Status: SHIPPED | OUTPATIENT
Start: 2025-04-10

## 2025-04-16 ENCOUNTER — HOSPITAL ENCOUNTER (OUTPATIENT)
Dept: CARDIOLOGY | Facility: HOSPITAL | Age: 85
Discharge: HOME | End: 2025-04-16
Payer: MEDICARE

## 2025-04-16 ENCOUNTER — OFFICE VISIT (OUTPATIENT)
Dept: CARDIOLOGY | Facility: HOSPITAL | Age: 85
End: 2025-04-16
Payer: MEDICARE

## 2025-04-16 VITALS
HEART RATE: 67 BPM | BODY MASS INDEX: 24.41 KG/M2 | SYSTOLIC BLOOD PRESSURE: 120 MMHG | WEIGHT: 143 LBS | OXYGEN SATURATION: 99 % | DIASTOLIC BLOOD PRESSURE: 73 MMHG | HEIGHT: 64 IN

## 2025-04-16 DIAGNOSIS — I35.0 NONRHEUMATIC AORTIC (VALVE) STENOSIS: ICD-10-CM

## 2025-04-16 DIAGNOSIS — Z95.2 STATUS POST TRANSCATHETER AORTIC VALVE REPLACEMENT: Primary | ICD-10-CM

## 2025-04-16 DIAGNOSIS — Z95.2 S/P TAVR (TRANSCATHETER AORTIC VALVE REPLACEMENT): ICD-10-CM

## 2025-04-16 LAB
AORTIC VALVE MEAN GRADIENT: 15 MMHG
AORTIC VALVE PEAK VELOCITY: 2.89 M/S
AV PEAK GRADIENT: 34 MMHG
AVA (PEAK VEL): 0.72 CM2
AVA (VTI): 0.82 CM2
EJECTION FRACTION APICAL 4 CHAMBER: 78.3
EJECTION FRACTION: 68 %
LEFT ATRIUM VOLUME AREA LENGTH INDEX BSA: 14.8 ML/M2
LEFT VENTRICLE INTERNAL DIMENSION DIASTOLE: 3.82 CM (ref 3.5–6)
LEFT VENTRICULAR OUTFLOW TRACT DIAMETER: 1.62 CM
MITRAL VALVE E/A RATIO: 0.84
RIGHT VENTRICLE FREE WALL PEAK S': 12.33 CM/S
RIGHT VENTRICLE PEAK SYSTOLIC PRESSURE: 29.2 MMHG
TRICUSPID ANNULAR PLANE SYSTOLIC EXCURSION: 2.5 CM

## 2025-04-16 PROCEDURE — 99213 OFFICE O/P EST LOW 20 MIN: CPT | Performed by: INTERNAL MEDICINE

## 2025-04-16 PROCEDURE — 1126F AMNT PAIN NOTED NONE PRSNT: CPT | Performed by: INTERNAL MEDICINE

## 2025-04-16 PROCEDURE — 1159F MED LIST DOCD IN RCRD: CPT | Performed by: INTERNAL MEDICINE

## 2025-04-16 PROCEDURE — 3078F DIAST BP <80 MM HG: CPT | Performed by: INTERNAL MEDICINE

## 2025-04-16 PROCEDURE — 93306 TTE W/DOPPLER COMPLETE: CPT

## 2025-04-16 PROCEDURE — 3074F SYST BP LT 130 MM HG: CPT | Performed by: INTERNAL MEDICINE

## 2025-04-16 ASSESSMENT — COLUMBIA-SUICIDE SEVERITY RATING SCALE - C-SSRS
1. IN THE PAST MONTH, HAVE YOU WISHED YOU WERE DEAD OR WISHED YOU COULD GO TO SLEEP AND NOT WAKE UP?: NO
6. HAVE YOU EVER DONE ANYTHING, STARTED TO DO ANYTHING, OR PREPARED TO DO ANYTHING TO END YOUR LIFE?: NO
2. HAVE YOU ACTUALLY HAD ANY THOUGHTS OF KILLING YOURSELF?: NO

## 2025-04-16 ASSESSMENT — ENCOUNTER SYMPTOMS
LOSS OF SENSATION IN FEET: 0
OCCASIONAL FEELINGS OF UNSTEADINESS: 0
DEPRESSION: 0

## 2025-04-16 ASSESSMENT — PATIENT HEALTH QUESTIONNAIRE - PHQ9
1. LITTLE INTEREST OR PLEASURE IN DOING THINGS: NOT AT ALL
SUM OF ALL RESPONSES TO PHQ9 QUESTIONS 1 AND 2: 0
2. FEELING DOWN, DEPRESSED OR HOPELESS: NOT AT ALL

## 2025-04-16 ASSESSMENT — PAIN SCALES - GENERAL: PAINLEVEL_OUTOF10: 0-NO PAIN

## 2025-04-16 NOTE — PROGRESS NOTES
KCQ Questionnaire      1  Heart failure affects different people in different ways. Some feel shortness of breath while others feel fatigue. Please indicate how much you are limited by heart failure (shortness of breath or fatigue) in your ability to do the following activities over the past 2 weeks. PRE PROCEDURE    A.) Showering/bathing  5. Not at All  B.) Walking 1 block on level ground 5. Not at All  C.) Hurrying or Jogging   4. Slightly    2.  Over the past 2 weeks, how many times did you have swelling in your feet, ankles or legs when you woke up in the morning? 5. Never    3.  Over the past 2 weeks, on average, how many times has fatigue limited your ability to do what you wanted? 6. Less than once a week    4.  Over the past 2 weeks, on average, how many times has shortness of breath limited your ability to do what you wanted? 7. Never    5.  Over the past 2 weeks, on average, how many times have you been forced to sleep sitting up in a chair or with at least 3 pillows to prop you up because of shortness of breath? Never    6. Over the past 2 weeks, how much has your heart failure limited your enjoyment of life? It has not limited my enjoyment of life    7. If you had to spend the rest of your life with your heart failure the way it is right now, how would you feel about this? 5. Completely satisfied    8. How much does your heart failure affect your lifestyle? Please indicate how your heart failure may have limited yourparticipation in the following activities over the past 2 weeks    A.)  Hobbies, recreational activities  5. Did not limit at all    B.) Working or doing household chores  5. Did not limit at all    C.) Visiting family or friends out of your home  5. Did not limit at all    5 Meter Walk 9 seconds

## 2025-04-16 NOTE — PROGRESS NOTES
HPI  Aure Hdez is a 84 y.o. year old female, PMH is significant for CAD, mitral regurg, LBBB, aortic stenosis s/p TAVR 03/16/2018, HTN, diastolic heart failure, mitral regurgitation, GERD, depression, SDH 6/2024, and syncope. The patient underwent TAVR on 03/16/2018 (low risk trial) and has been followed by Dr. Preciado and Sweetie Dubon, APRN-CNP. ~ 7 years ago and she was enrolled in low risk trial at the time. S/p 20 mm Marie 3 ultra + 2 cc after failure of primary implant.        Social History     Tobacco Use    Smoking status: Never    Smokeless tobacco: Never   Substance Use Topics    Alcohol use: Not Currently        Family History[1]     RX Allergies[2]     Current Outpatient Medications   Medication Instructions    aspirin 81 mg, oral, Daily    atorvastatin (LIPITOR) 20 mg, oral, Daily    cholecalciferol (Vitamin D-3) 125 MCG (5000 UT) capsule 1 capsule, Daily    clobetasol (Temovate) 0.05 % cream Apply to affected areas of thickened skin on the arms twice daily    famotidine (PEPCID) 20 mg, Daily    FLUoxetine (PROzac) 20 mg capsule TAKE ONE CAPSULE BY MOUTH EVERY DAY    fluticasone (Flonase Allergy Relief) 50 mcg/actuation nasal spray 1 spray, Each Nostril, Daily, Shake gently. Before first use, prime pump. After use, clean tip and replace cap.    L.ac,bul,par,rha-B.ani,manny-inu (Probiotic Digest Supp, 6-strn,) 10 billion cell -100 mg capsule 1 tablet, Daily    loratadine (CLARITIN) 10 mg, Daily    losartan (COZAAR) 25 mg, oral, Daily    nebulizers misc 1 Device, miscellaneous, See admin instructions, Compressor and nebulizer kit (tubing and mouth piece)        Vitals:    04/16/25 0929   BP: 120/73   Pulse: 67   SpO2: 99%        Physical Exam:     General:  Alert, calm, well-developed   HEENT:  Pupils equal, round, reactive to light and accommodation. Extraocular movements   Neck:  Supple, without lymphadenopathy or thyromegaly. No carotid bruits.  Lymph:  No axillary, cervical, supraclavicular,  pre-auricular, submental, or occipital lymphadenopathy,  Cardiovascular:  Regular rate and rhythm, with normal S1 and S2. Aortic murmurs 2/6, no rubs or gallops. No JVD. 2+ pulses bilaterally - dorsalis pedis and radial.  Lungs:  lung clear. No wheezes. No accessory muscle use or cyanosis. No tenderness to palpation.  Abdomen:  Normoactive bowel sounds. Soft, flat, non-tender, and non-distended. No hepatosplenomegaly; liver span approximately 10 cm.  Skin:  Warm, dry, well-perfused. No rashes or other lesions.  Extremities:  2+ pulses in upper and lower extremities. No lower extremity pain or edema; legs are symmetric in appearance.  Neuro:  Alert and oriented to person, place, and time. Able to communicate well. 5/5 strength in all extremities bilaterally. Sensation intact in all extremities. Normal gait.      EKG:  Encounter Date: 12/05/24   Electrocardiogram, 12-lead PRN ACS symptoms   Result Value    Ventricular Rate 71    Atrial Rate 71    IA Interval 170    QRS Duration 94    QT Interval 440    QTC Calculation(Bazett) 478    P Axis 29    R Axis 55    T Axis 80    QRS Count 12    Q Onset 219    P Onset 134    P Offset 193    T Offset 439    QTC Fredericia 465    Narrative    Normal sinus rhythm  LOW VOLTAGE LIMB LEADS  Possible Anterior infarct , age undetermined  Abnormal ECG  Confirmed by Navid Mcdonnell ([id number]) on 12/19/2024 12:51:43 PM      Lab Results   Component Value    CR 0.78     HGB 11.7     ALBUMIN 3.9               TT ECHO 04/16/25:     1. Left ventricular ejection fraction is normal, by visual estimate at 65-70%.   2. Left ventricular diastolic filling is indeterminate.   3. There is normal right ventricular global systolic function.   4. There is moderate mitral annular calcification.   5. Right ventricular systolic pressure is within normal limits.   6. S/p TAVR with gradients of 34/15mmHg, DI of 0.4 and trace perivalvular AI.   7. Compared with study dated 2/6/2025, ( [facility]) the prior TAVR gradients were 28/16mmHg with preserved LV systolic function.     Impression:   Aure Hdez is a 84 y.o. year old female, that patient underwent TAVR on 03/16/2018 (low risk trial) and developed progression of atortic stenosis following by implant of ROSIO 20 mm Marie 3 ultra + 2 cc after failure of primary implant. 5 months follow up procedure patient is asymptomatic living independent life, go to groceries and take care of her house with her . NYHA 1.         [1]   Family History  Problem Relation Name Age of Onset    Hypertension Mother      Stroke Mother      No Known Problems Father     [2]   Allergies  Allergen Reactions    Lovastatin Myalgia    Montelukast Unknown     Patient cannot remember reaction

## 2025-04-21 ENCOUNTER — HOSPITAL ENCOUNTER (OUTPATIENT)
Dept: RADIOLOGY | Facility: CLINIC | Age: 85
Discharge: HOME | End: 2025-04-21
Payer: MEDICARE

## 2025-04-21 DIAGNOSIS — Z12.31 ENCOUNTER FOR SCREENING MAMMOGRAM FOR BREAST CANCER: ICD-10-CM

## 2025-04-21 LAB
AORTIC VALVE MEAN GRADIENT: 15 MMHG
AORTIC VALVE PEAK VELOCITY: 2.89 M/S
AV PEAK GRADIENT: 34 MMHG
AVA (PEAK VEL): 0.72 CM2
AVA (VTI): 0.82 CM2
BODY SURFACE AREA: 1.71 M2
EJECTION FRACTION APICAL 4 CHAMBER: 78.3
EJECTION FRACTION: 68 %
LEFT ATRIUM VOLUME AREA LENGTH INDEX BSA: 14.8 ML/M2
LEFT VENTRICLE INTERNAL DIMENSION DIASTOLE: 3.82 CM (ref 3.5–6)
LEFT VENTRICULAR OUTFLOW TRACT DIAMETER: 1.62 CM
MITRAL VALVE E/A RATIO: 0.84
RIGHT VENTRICLE FREE WALL PEAK S': 12.33 CM/S
RIGHT VENTRICLE PEAK SYSTOLIC PRESSURE: 29.2 MMHG
TRICUSPID ANNULAR PLANE SYSTOLIC EXCURSION: 2.5 CM

## 2025-04-21 PROCEDURE — 77063 BREAST TOMOSYNTHESIS BI: CPT | Performed by: RADIOLOGY

## 2025-04-21 PROCEDURE — 77063 BREAST TOMOSYNTHESIS BI: CPT

## 2025-04-21 PROCEDURE — 77067 SCR MAMMO BI INCL CAD: CPT | Performed by: RADIOLOGY

## 2025-07-29 ENCOUNTER — OFFICE VISIT (OUTPATIENT)
Dept: CARDIOLOGY | Facility: CLINIC | Age: 85
End: 2025-07-29
Payer: MEDICARE

## 2025-07-29 VITALS
OXYGEN SATURATION: 93 % | SYSTOLIC BLOOD PRESSURE: 127 MMHG | HEART RATE: 68 BPM | WEIGHT: 145 LBS | DIASTOLIC BLOOD PRESSURE: 57 MMHG | BODY MASS INDEX: 24.89 KG/M2

## 2025-07-29 DIAGNOSIS — I10 PRIMARY HYPERTENSION: ICD-10-CM

## 2025-07-29 DIAGNOSIS — Z95.2 S/P TAVR (TRANSCATHETER AORTIC VALVE REPLACEMENT): ICD-10-CM

## 2025-07-29 DIAGNOSIS — I35.0 NONRHEUMATIC AORTIC VALVE STENOSIS: ICD-10-CM

## 2025-07-29 DIAGNOSIS — I25.10 ASHD (ARTERIOSCLEROTIC HEART DISEASE): Primary | ICD-10-CM

## 2025-07-29 DIAGNOSIS — I44.7 LEFT BUNDLE BRANCH BLOCK (LBBB): ICD-10-CM

## 2025-07-29 PROCEDURE — 99202 OFFICE O/P NEW SF 15 MIN: CPT

## 2025-07-29 ASSESSMENT — ENCOUNTER SYMPTOMS
DEPRESSION: 0
LOSS OF SENSATION IN FEET: 0
OCCASIONAL FEELINGS OF UNSTEADINESS: 1

## 2025-07-29 NOTE — PATIENT INSTRUCTIONS
Thank you for your visit today. Please contact our office (via MyChart or phone) with any additional questions.     [facility] [place]    Yeni, RN/Clinic Nurse for:    Dr. Megan Akins    [address] [facility]  [place] [address]    Phone: [phone] Press Option 5 then Option 3 to speak with the Clinic Nurse (Yeni)    _____    To Reach:    Billing Questions -    [phone]  Scheduling / Rescheduling -  Option 1  Refills / Medication Requests -  Option 3  General Office /  -  Option 4  Results -     Option 6  Medical Records -    Option 7  Repeat Options -    Option 9

## 2025-07-29 NOTE — PROGRESS NOTES
Cardiology Established Outpatient Visit    Reason for visit: history of aortic stenosis s/p valve-in valve TAVR    HPI: Aure Hdez is a 85 y.o.  female who presents today to establish care with general cardiology. Past medical history of aortic stenosis s/p TAVR, CAD, LBBB, HTN, mitral regurg, s/p TAVR 2018.  Presents today for the 1 month follow-up s/p TAV in TAVR - 20mm Marie 3 Ultra +2cc on 12/11/24 with Farshad Preciado and Francis.     Patient presented to cardiology clinic on 1/23/2025.  Patient recovering well after valve in valve TAVR.  Patient appears euvolemic on exam; asymptomatic from a cardiovascular perspective.  Patient notes interval improvement in fatigue and dyspnea after valve in valve TAVR.  Blood pressure is well-controlled.    Past Medical History:   - as above    Surgical History:   She has a past surgical history that includes Bladder surgery (02/01/2018); Colonoscopy (03/23/2018); Cholecystectomy (02/01/2018); Total vaginal hysterectomy (03/23/2018); Tonsillectomy (03/23/2018); Other surgical history (05/09/2018); Cardiac catheterization (N/A, 11/22/2024); Cardiac catheterization (N/A, 12/11/2024); Cardiac catheterization (N/A, 12/11/2024); and Cardiac catheterization (N/A, 12/11/2024).    Family History:   Family History   Problem Relation Name Age of Onset    Hypertension Mother      Stroke Mother      No Known Problems Father       Allergies:  Lovastatin and Montelukast     Social History:   - Non-smoker; no illicit drug use    Prior Cardiovascular Testing (personally reviewed):     TTE (12/12/2024)   1. Left ventricular ejection fraction is low normal, by visual estimate at 50-55%.   2. There is normal right ventricular global systolic function.   3. The left atrium is severely dilated.   4. There is a moderate pleural effusion.   5. There is no evidence of cardiac tamponade.   6. Mild to moderate mitral valve regurgitation.   7. Slightly elevated right ventricular systolic  pressure.   8. There is an Sams transcatheter aortic valve bioprosthesis with a 20 mm reported size.   9. 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 This echodenisty is known prior to recent TAV in TAV.  10. S/p TAV with 20mm Marie 3 Ultra in TAV (Medtronic 26mm 3/16/18); Current TAVR gradients are now lower at 21mmhg.  11. Echo findings are consistent with normal aortic valve prosthesis structure and function.     TAVR (12/11/2024)  1. Transcatheter aortic valve replacement with successful implantation of 20 mm Sapien3 ultra valve.     [facility] 11/22/2024   1. Right dominant coronary circulation with non-obstructive CAD,   2. LVEDP 12 mm Hg.    TTE 10/28: EF 56%, s/p TAVR with Evolut 26, severe aortic stenosis with AV gradients 80/41, Vmax 4.48, DI 0.3, moderate valvular regurgitation with P1/2T 158msec, Moderate MR. ASD with left to right shunt. Svevere pulmonary HTN w/ SPAP of 94mmHg.      Review of Systems:  ROS    Objective     Outpatient Medications:    Current Outpatient Medications:     aspirin 81 mg chewable tablet, Chew 1 tablet (81 mg) once daily., Disp: , Rfl:     atorvastatin (Lipitor) 20 mg tablet, TAKE 1 TABLET BY MOUTH ONCE DAILY, Disp: 90 tablet, Rfl: 3    cholecalciferol (Vitamin D-3) 125 MCG (5000 UT) capsule, Take 1 capsule (125 mcg) by mouth once daily., Disp: , Rfl:     clobetasol (Temovate) 0.05 % cream, Apply to affected areas of thickened skin on the arms twice daily, Disp: 60 g, Rfl: 0    famotidine (Pepcid) 20 mg tablet, Take 1 tablet (20 mg) by mouth once daily., Disp: , Rfl:     FLUoxetine (PROzac) 20 mg capsule, TAKE ONE CAPSULE BY MOUTH EVERY DAY, Disp: 90 capsule, Rfl: 1    fluticasone (Flonase Allergy Relief) 50 mcg/actuation nasal spray, Administer 1 spray into each nostril once daily. Shake gently. Before first use,  prime pump. After use, clean tip and replace cap., Disp: 16 g, Rfl: 12    L.ac,bul,par,rha-B.ani,manny-inu (Probiotic Digest Supp, 6-strn,) 10 billion cell -100 mg capsule, Take 1 tablet by mouth once daily., Disp: , Rfl:     losartan (Cozaar) 25 mg tablet, Take 1 tablet (25 mg) by mouth once daily., Disp: 90 tablet, Rfl: 3    nebulizers misc, 1 Device see administration instructions. Compressor and nebulizer kit (tubing and mouth piece) (Patient not taking: Reported on 7/29/2025), Disp: 1 each, Rfl: 0     Last Recorded Vitals  /57 (BP Location: Left arm, Patient Position: Sitting)   Pulse 68   Wt 65.8 kg (145 lb)   SpO2 93%   BMI 24.89 kg/m²     Physical Exam:  Physical Exam  Constitutional:       General: She is not in acute distress.  HENT:      Head: Normocephalic.      Mouth/Throat:      Mouth: Mucous membranes are moist.     Eyes:      Extraocular Movements: Extraocular movements intact.      Conjunctiva/sclera: Conjunctivae normal.     Neck:      Vascular: No carotid bruit or JVD.     Cardiovascular:      Rate and Rhythm: Normal rate and regular rhythm.      Pulses: Normal pulses.      Heart sounds: Murmur heard.      Systolic murmur is present with a grade of 2/6.   Pulmonary:      Effort: Pulmonary effort is normal. No respiratory distress.      Breath sounds: Normal breath sounds.   Abdominal:      General: Bowel sounds are normal. There is no distension.      Palpations: Abdomen is soft.     Musculoskeletal:         General: No swelling.      Right lower leg: No edema.      Left lower leg: No edema.     Skin:     General: Skin is warm and dry.     Neurological:      General: No focal deficit present.      Mental Status: She is alert.      Cranial Nerves: No cranial nerve deficit.      Motor: No weakness.     Psychiatric:         Mood and Affect: Mood normal.         Behavior: Behavior normal.         Lab Review:    Lab Results   Component Value Date    GLUCOSE 81 03/10/2025    CALCIUM 8.3 (L)  "03/10/2025     03/10/2025    K 4.0 03/10/2025    CO2 28 03/10/2025     03/10/2025    BUN 15 03/10/2025    CREATININE 0.78 03/10/2025       Lab Results   Component Value Date    WBC 5.7 03/10/2025    HGB 11.7 03/10/2025    HCT 35.9 03/10/2025    MCV 88.6 03/10/2025     03/10/2025       Lab Results   Component Value Date    CHOL 150 03/10/2025    CHOL 130 02/08/2024    CHOL 154 07/15/2023     Lab Results   Component Value Date    HDL 73 03/10/2025    HDL 51.9 02/08/2024    HDL 64.7 07/15/2023     Lab Results   Component Value Date    LDLCALC 60 03/10/2025    LDLCALC 63 02/08/2024     Lab Results   Component Value Date    TRIG 89 03/10/2025    TRIG 77 02/08/2024    TRIG 86 07/15/2023     No components found for: \"CHOLHDL\"    Lab Results   Component Value Date     (H) 12/05/2024     (H) 11/19/2024    BNP 1,151 (H) 10/10/2024       Lab Results   Component Value Date    TSH 2.74 07/15/2023       Assessment:   84 y.o.  female who presents today to establish care with general cardiology. Past medical history of aortic stenosis s/p TAVR, CAD, LBBB, HTN, mitral regurg, s/p TAVR 2018.  Presents today for the 1 month follow-up s/p TAV in TAVR - 20mm Marie 3 Ultra +2cc on 12/11/24 with Farshad Preciado and Francis.     Patient recovering well after valve in valve TAVR; currently asymptomatic and euvolemic on exam.  Continue cardiac management as below.    Overall Plan:    1.  History of aortic stenosis status post TAVR in 2018; status post valve in valve TAVR 12/2024 for bioprosthetic dysfunction  - Currently asymptomatic  - Monitor clinically and with serial imaging  - Lifelong aspirin 81 mg daily  - Dental prophylaxis prior to any dental procedure in setting of bioprosthetic valve    2.  Atherosclerotic heart disease  - Nonobstructive for coronary angiography prior to most recent TAVR  - Encouraged heart healthy/Mediterranean style diet  - Aspirin 81 mg daily  - Dyslipidemia management as " "below    3.  Primary hypertension  - Currently well-controlled  - Continue losartan 25 mg daily    4.  Dyslipidemia  - Continue atorvastatin 20 mg daily  - Continue dietary lifestyle modifications    5.  Left bundle branch block  - Asymptomatic; monitor clinically    6. Discussed \"red flag\" symptoms that should prompt immediate medical attention; patient verbalized understanding    Disposition: Return to cardiology clinic in 6 months    Kevin Guzman MD        "

## 2025-07-31 DIAGNOSIS — F32.A CONTROLLED DEPRESSION: ICD-10-CM

## 2025-07-31 RX ORDER — FLUOXETINE 20 MG/1
20 CAPSULE ORAL
Qty: 90 CAPSULE | Refills: 1 | Status: SHIPPED | OUTPATIENT
Start: 2025-07-31

## 2025-08-07 ASSESSMENT — ENCOUNTER SYMPTOMS
CONSTITUTIONAL NEGATIVE: 1
HEMATOLOGIC/LYMPHATIC NEGATIVE: 1
ALLERGIC/IMMUNOLOGIC NEGATIVE: 1
ENDOCRINE NEGATIVE: 1
MUSCULOSKELETAL NEGATIVE: 1
SHORTNESS OF BREATH: 0
EYES NEGATIVE: 1
NEAR-SYNCOPE: 0
PSYCHIATRIC NEGATIVE: 1
SYNCOPE: 0
ORTHOPNEA: 0
DYSPNEA ON EXERTION: 0
PND: 0
RESPIRATORY NEGATIVE: 1
NEUROLOGICAL NEGATIVE: 1
GASTROINTESTINAL NEGATIVE: 1
PALPITATIONS: 0

## 2025-08-25 ENCOUNTER — TELEPHONE (OUTPATIENT)
Dept: PRIMARY CARE | Facility: CLINIC | Age: 85
End: 2025-08-25
Payer: MEDICARE

## 2025-09-17 ENCOUNTER — APPOINTMENT (OUTPATIENT)
Dept: PRIMARY CARE | Facility: CLINIC | Age: 85
End: 2025-09-17
Payer: MEDICARE

## 2026-03-04 ENCOUNTER — APPOINTMENT (OUTPATIENT)
Dept: PRIMARY CARE | Facility: CLINIC | Age: 86
End: 2026-03-04
Payer: MEDICARE

## (undated) DEVICE — CATHETER, DUAL LUMEN, 5/8 110CM

## (undated) DEVICE — PAD, ELECTRODE DEFIB PADPRO ADULT STRL W/ADAPTER

## (undated) DEVICE — ACCESS KIT, S-MAK MINI, 4FR 10CM 0.018IN 40CM, NT/PT, ECHO ENHANCE NEEDLE

## (undated) DEVICE — GUIDEWIRE, INQWIRE, 3MM J, .035, 260

## (undated) DEVICE — CATHETER, PACING, PACEL, FLOW DIRECTED, 5 FR X 110 CM

## (undated) DEVICE — CABLE, PACING PATIENT BIPOLAR 8'

## (undated) DEVICE — GUIDEWIRE, SAFARI 2, .035 X 275CM, EXTRA SMALL CURVE, PRE-SHAPED

## (undated) DEVICE — CATHETER, ANGIO, IMPULSE, FL3.5, 5 FR X 100 CM

## (undated) DEVICE — SHEATH, PINNACLE, 10 CM,  5FR INTRODUCER, 5FR DIA, 2.5 CM DIALATOR

## (undated) DEVICE — SHEATH, PINNACLE, 10 CM,  4FR INTRODUCER, 4FR DIA, 2.5 CM DIALATOR

## (undated) DEVICE — CATHETER, DIAGNOSTIC, 6 FR INFINITI, PIG

## (undated) DEVICE — SHEATH, PINNACLE, 10 CM,  6FR INTRODUCER, 6FR DIA, 2.5 CM DIALATOR

## (undated) DEVICE — GUIDE WIRE, 035/190CM, HI-TORGUE SUPRA CORE

## (undated) DEVICE — CATHETER, ANGIO, IMPULSE, FR4, 5 FR X 100 CM

## (undated) DEVICE — DEVICE, CLOSURE, PERCLOSE, PROSTYLE

## (undated) DEVICE — CATHETER, ANGIO, IMPULSE, FR4, 6 FR X 100 CM

## (undated) DEVICE — CATHETER, ANGIO, IMPULSE, PIG 155, 6 FR X 110 CM

## (undated) DEVICE — INTRODUCER SET, FAST CATHETER, HEMOSTASIS, 7 FR X 12CM, W/LUER LOCK, SIDEPORT

## (undated) DEVICE — GUIDEWIRE, INQWIRE, 3MM J, .035, 150

## (undated) DEVICE — SHEATH, INTRODUCER, ACT, 6.5FR 11CML, GREEN, 0.038 IN